# Patient Record
Sex: MALE | Race: WHITE | Employment: OTHER
[De-identification: names, ages, dates, MRNs, and addresses within clinical notes are randomized per-mention and may not be internally consistent; named-entity substitution may affect disease eponyms.]

---

## 2023-12-27 ENCOUNTER — APPOINTMENT (OUTPATIENT)
Facility: HOSPITAL | Age: 83
End: 2023-12-27

## 2023-12-27 ENCOUNTER — HOSPITAL ENCOUNTER (EMERGENCY)
Facility: HOSPITAL | Age: 83
Discharge: HOME | End: 2023-12-27
Attending: EMERGENCY MEDICINE

## 2023-12-27 VITALS
OXYGEN SATURATION: 99 % | SYSTOLIC BLOOD PRESSURE: 159 MMHG | HEART RATE: 77 BPM | DIASTOLIC BLOOD PRESSURE: 71 MMHG | RESPIRATION RATE: 20 BRPM | WEIGHT: 185 LBS | BODY MASS INDEX: 25.06 KG/M2 | HEIGHT: 72 IN | TEMPERATURE: 97.5 F

## 2023-12-27 DIAGNOSIS — R79.89 ELEVATED TROPONIN: Primary | ICD-10-CM

## 2023-12-27 DIAGNOSIS — R06.02 SHORTNESS OF BREATH: ICD-10-CM

## 2023-12-27 LAB
ALBUMIN SERPL-MCNC: 2.9 G/DL (ref 3.5–5)
ALBUMIN/GLOB SERPL: 0.9 (ref 1.1–2.2)
ALP SERPL-CCNC: 74 U/L (ref 45–117)
ALT SERPL-CCNC: 29 U/L (ref 12–78)
ANION GAP SERPL CALC-SCNC: 2 MMOL/L (ref 5–15)
AST SERPL W P-5'-P-CCNC: 25 U/L (ref 15–37)
BASOPHILS # BLD: 0.1 K/UL (ref 0–0.1)
BASOPHILS NFR BLD: 1 % (ref 0–1)
BILIRUB SERPL-MCNC: 0.5 MG/DL (ref 0.2–1)
BUN SERPL-MCNC: 22 MG/DL (ref 6–20)
BUN/CREAT SERPL: 20 (ref 12–20)
CA-I BLD-MCNC: 9.4 MG/DL (ref 8.5–10.1)
CHLORIDE SERPL-SCNC: 108 MMOL/L (ref 97–108)
CO2 SERPL-SCNC: 30 MMOL/L (ref 21–32)
CREAT SERPL-MCNC: 1.12 MG/DL (ref 0.7–1.3)
DIFFERENTIAL METHOD BLD: ABNORMAL
EOSINOPHIL # BLD: 0.2 K/UL (ref 0–0.4)
EOSINOPHIL NFR BLD: 3 % (ref 0–7)
ERYTHROCYTE [DISTWIDTH] IN BLOOD BY AUTOMATED COUNT: 14.1 % (ref 11.5–14.5)
GLOBULIN SER CALC-MCNC: 3.4 G/DL (ref 2–4)
GLUCOSE SERPL-MCNC: 92 MG/DL (ref 65–100)
HCT VFR BLD AUTO: 38 % (ref 36.6–50.3)
HGB BLD-MCNC: 12.6 G/DL (ref 12.1–17)
IMM GRANULOCYTES # BLD AUTO: 0 K/UL (ref 0–0.04)
IMM GRANULOCYTES NFR BLD AUTO: 0 % (ref 0–0.5)
LYMPHOCYTES # BLD: 1.7 K/UL (ref 0.8–3.5)
LYMPHOCYTES NFR BLD: 28 % (ref 12–49)
MCH RBC QN AUTO: 30.9 PG (ref 26–34)
MCHC RBC AUTO-ENTMCNC: 33.2 G/DL (ref 30–36.5)
MCV RBC AUTO: 93.1 FL (ref 80–99)
MONOCYTES # BLD: 0.7 K/UL (ref 0–1)
MONOCYTES NFR BLD: 12 % (ref 5–13)
NEUTS SEG # BLD: 3.4 K/UL (ref 1.8–8)
NEUTS SEG NFR BLD: 56 % (ref 32–75)
NRBC # BLD: 0 K/UL (ref 0–0.01)
NRBC BLD-RTO: 0 PER 100 WBC
PLATELET # BLD AUTO: 239 K/UL (ref 150–400)
PMV BLD AUTO: 8.5 FL (ref 8.9–12.9)
POTASSIUM SERPL-SCNC: 4.2 MMOL/L (ref 3.5–5.1)
PROT SERPL-MCNC: 6.3 G/DL (ref 6.4–8.2)
RBC # BLD AUTO: 4.08 M/UL (ref 4.1–5.7)
SODIUM SERPL-SCNC: 140 MMOL/L (ref 136–145)
TROPONIN I SERPL HS-MCNC: 179 NG/L (ref 0–76)
TROPONIN I SERPL HS-MCNC: 184 NG/L (ref 0–76)
TROPONIN I SERPL HS-MCNC: 202 NG/L (ref 0–76)
WBC # BLD AUTO: 6 K/UL (ref 4.1–11.1)

## 2023-12-27 PROCEDURE — 84484 ASSAY OF TROPONIN QUANT: CPT

## 2023-12-27 PROCEDURE — 36415 COLL VENOUS BLD VENIPUNCTURE: CPT

## 2023-12-27 PROCEDURE — 99285 EMERGENCY DEPT VISIT HI MDM: CPT

## 2023-12-27 PROCEDURE — 71045 X-RAY EXAM CHEST 1 VIEW: CPT

## 2023-12-27 PROCEDURE — 93005 ELECTROCARDIOGRAM TRACING: CPT

## 2023-12-27 PROCEDURE — 80053 COMPREHEN METABOLIC PANEL: CPT

## 2023-12-27 PROCEDURE — 93005 ELECTROCARDIOGRAM TRACING: CPT | Performed by: EMERGENCY MEDICINE

## 2023-12-27 PROCEDURE — 85025 COMPLETE CBC W/AUTO DIFF WBC: CPT

## 2023-12-27 NOTE — ED TRIAGE NOTES
[facility]

## 2023-12-27 NOTE — ED PROVIDER NOTES
Cessation: Not Applicable    PROCEDURES   Unless otherwise noted above, none. Procedures      CRITICAL CARE TIME   Patient does not meet Critical Care Time, 0 minutes    FINAL IMPRESSION   No diagnosis found. DISPOSITION/PLAN   DISPOSITION    I was not present at the time of ED disposition but patient ultimately discharged home. PATIENT REFERRED TO:  No follow-up provider specified. DISCHARGE MEDICATIONS:     Medication List      You have not been prescribed any medications. DISCONTINUED MEDICATIONS:  There are no discharge medications for this patient. I am the Primary Clinician of Record: CONTRERAS Serna NP (electronically signed)    (Please note that parts of this dictation were completed with voice recognition software. Quite often unanticipated grammatical, syntax, homophones, and other interpretive errors are inadvertently transcribed by the computer software. Please disregards these errors.  Please excuse any errors that have escaped final proofreading.)     CONTRERAS Serna NP  12/28/23 9026       CONTRERAS Serna NP  12/28/23 2113

## 2023-12-28 LAB
EKG ATRIAL RATE: 58 BPM
EKG ATRIAL RATE: 61 BPM
EKG ATRIAL RATE: 62 BPM
EKG DIAGNOSIS: NORMAL
EKG P AXIS: 27 DEGREES
EKG P AXIS: 37 DEGREES
EKG P AXIS: 39 DEGREES
EKG P-R INTERVAL: 160 MS
EKG P-R INTERVAL: 174 MS
EKG P-R INTERVAL: 186 MS
EKG Q-T INTERVAL: 442 MS
EKG Q-T INTERVAL: 454 MS
EKG Q-T INTERVAL: 476 MS
EKG QRS DURATION: 78 MS
EKG QRS DURATION: 90 MS
EKG QRS DURATION: 90 MS
EKG QTC CALCULATION (BAZETT): 444 MS
EKG QTC CALCULATION (BAZETT): 460 MS
EKG QTC CALCULATION (BAZETT): 467 MS
EKG R AXIS: -10 DEGREES
EKG R AXIS: 14 DEGREES
EKG R AXIS: 18 DEGREES
EKG T AXIS: 23 DEGREES
EKG T AXIS: 35 DEGREES
EKG T AXIS: 49 DEGREES
EKG VENTRICULAR RATE: 58 BPM
EKG VENTRICULAR RATE: 61 BPM
EKG VENTRICULAR RATE: 62 BPM

## 2023-12-28 NOTE — DISCHARGE INSTRUCTIONS
Monocytes Absolute 0.7 0.0 - 1.0 K/UL    Eosinophils Absolute 0.2 0.0 - 0.4 K/UL    Basophils Absolute 0.1 0.0 - 0.1 K/UL    Absolute Immature Granulocyte 0.0 0.00 - 0.04 K/UL    Differential Type AUTOMATED     Troponin    Collection Time: 12/27/23  3:54 PM   Result Value Ref Range    Troponin, High Sensitivity 179 (HH) 0 - 76 ng/L   Troponin    Collection Time: 12/27/23  7:06 PM   Result Value Ref Range    Troponin, High Sensitivity 202 (HH) 0 - 76 ng/L   Troponin    Collection Time: 12/27/23  8:39 PM   Result Value Ref Range    Troponin, High Sensitivity 184 (HH) 0 - 76 ng/L       Radiologic Studies  XR CHEST PORTABLE   Final Result   No acute cardiopulmonary findings. ------------------------------------------------------------------------------------------------------------  The exam and treatment you received in the Emergency Department were for an urgent problem and are not intended as complete care. It is important that you follow-up with a doctor, nurse practitioner, or physician assistant to:  (1) confirm your diagnosis,  (2) re-evaluation of changes in your illness and treatment, and  (3) for ongoing care. Please take your discharge instructions with you when you go to your follow-up appointment. If you have any problem arranging a follow-up appointment, contact the Emergency Department. If your symptoms become worse or you do not improve as expected and you are unable to reach your health care provider, please return to the Emergency Department. We are available 24 hours a day. If a prescription has been provided, please have it filled as soon as possible to prevent a delay in treatment. If you have any questions or reservations about taking the medication due to side effects or interactions with other medications, please call your primary care provider or contact the ER.

## 2025-04-02 ENCOUNTER — APPOINTMENT (OUTPATIENT)
Facility: HOSPITAL | Age: 85
End: 2025-04-02
Payer: OTHER GOVERNMENT

## 2025-04-02 ENCOUNTER — HOSPITAL ENCOUNTER (INPATIENT)
Facility: HOSPITAL | Age: 85
LOS: 9 days | Discharge: HOSPICE | End: 2025-04-11
Attending: EMERGENCY MEDICINE | Admitting: INTERNAL MEDICINE
Payer: OTHER GOVERNMENT

## 2025-04-02 DIAGNOSIS — E87.0 HYPERNATREMIA: ICD-10-CM

## 2025-04-02 DIAGNOSIS — I21.4 NSTEMI (NON-ST ELEVATED MYOCARDIAL INFARCTION) (HCC): Primary | ICD-10-CM

## 2025-04-02 DIAGNOSIS — I63.49 CEREBROVASCULAR ACCIDENT (CVA) DUE TO EMBOLISM OF OTHER CEREBRAL ARTERY (HCC): ICD-10-CM

## 2025-04-02 DIAGNOSIS — J96.01 ACUTE RESPIRATORY FAILURE WITH HYPOXIA (HCC): ICD-10-CM

## 2025-04-02 DIAGNOSIS — N17.9 ACUTE KIDNEY INJURY: ICD-10-CM

## 2025-04-02 DIAGNOSIS — I42.9 CARDIOMYOPATHY, UNSPECIFIED TYPE (HCC): ICD-10-CM

## 2025-04-02 DIAGNOSIS — J96.01 ACUTE RESPIRATORY FAILURE WITH HYPOXEMIA (HCC): ICD-10-CM

## 2025-04-02 LAB
ALBUMIN SERPL-MCNC: 2.6 G/DL (ref 3.5–5)
ALBUMIN SERPL-MCNC: 2.6 G/DL (ref 3.5–5)
ALBUMIN/GLOB SERPL: 0.6 (ref 1.1–2.2)
ALBUMIN/GLOB SERPL: 0.6 (ref 1.1–2.2)
ALP SERPL-CCNC: 100 U/L (ref 45–117)
ALP SERPL-CCNC: 100 U/L (ref 45–117)
ALT SERPL-CCNC: 69 U/L (ref 12–78)
ALT SERPL-CCNC: 69 U/L (ref 12–78)
ANION GAP SERPL CALC-SCNC: 12 MMOL/L (ref 2–12)
ANION GAP SERPL CALC-SCNC: 12 MMOL/L (ref 2–12)
AST SERPL W P-5'-P-CCNC: 111 U/L (ref 15–37)
AST SERPL W P-5'-P-CCNC: 111 U/L (ref 15–37)
BASE DEFICIT BLD-SCNC: 13.7 MMOL/L
BASE DEFICIT BLD-SCNC: 13.7 MMOL/L
BASE DEFICIT BLD-SCNC: 7 MMOL/L
BASE DEFICIT BLD-SCNC: 7 MMOL/L
BASOPHILS # BLD: 0.05 K/UL (ref 0–0.1)
BASOPHILS # BLD: 0.05 K/UL (ref 0–0.1)
BASOPHILS NFR BLD: 0.3 % (ref 0–1)
BASOPHILS NFR BLD: 0.3 % (ref 0–1)
BILIRUB SERPL-MCNC: 1.2 MG/DL (ref 0.2–1)
BILIRUB SERPL-MCNC: 1.2 MG/DL (ref 0.2–1)
BNP SERPL-MCNC: 9623 PG/ML
BNP SERPL-MCNC: 9623 PG/ML
BUN SERPL-MCNC: 152 MG/DL (ref 6–20)
BUN SERPL-MCNC: 152 MG/DL (ref 6–20)
BUN/CREAT SERPL: 34 (ref 12–20)
BUN/CREAT SERPL: 34 (ref 12–20)
CA-I BLD-MCNC: 0.66 MMOL/L (ref 1.12–1.32)
CA-I BLD-MCNC: 0.66 MMOL/L (ref 1.12–1.32)
CA-I BLD-MCNC: 9.4 MG/DL (ref 8.5–10.1)
CA-I BLD-MCNC: 9.4 MG/DL (ref 8.5–10.1)
CHLORIDE BLD-SCNC: 132 MMOL/L (ref 98–107)
CHLORIDE BLD-SCNC: 132 MMOL/L (ref 98–107)
CHLORIDE SERPL-SCNC: 123 MMOL/L (ref 97–108)
CHLORIDE SERPL-SCNC: 123 MMOL/L (ref 97–108)
CK SERPL-CCNC: 1243 U/L (ref 39–308)
CK SERPL-CCNC: 1243 U/L (ref 39–308)
CO2 BLD-SCNC: 10 MMOL/L
CO2 BLD-SCNC: 10 MMOL/L
CO2 SERPL-SCNC: 21 MMOL/L (ref 21–32)
CO2 SERPL-SCNC: 21 MMOL/L (ref 21–32)
CREAT SERPL-MCNC: 4.49 MG/DL (ref 0.7–1.3)
CREAT SERPL-MCNC: 4.49 MG/DL (ref 0.7–1.3)
CREAT UR-MCNC: 1.84 MG/DL (ref 0.6–1.3)
CREAT UR-MCNC: 1.84 MG/DL (ref 0.6–1.3)
DIFFERENTIAL METHOD BLD: ABNORMAL
DIFFERENTIAL METHOD BLD: ABNORMAL
EOSINOPHIL # BLD: 0 K/UL (ref 0–0.4)
EOSINOPHIL # BLD: 0 K/UL (ref 0–0.4)
EOSINOPHIL NFR BLD: 0 % (ref 0–7)
EOSINOPHIL NFR BLD: 0 % (ref 0–7)
ERYTHROCYTE [DISTWIDTH] IN BLOOD BY AUTOMATED COUNT: 14.9 % (ref 11.5–14.5)
ERYTHROCYTE [DISTWIDTH] IN BLOOD BY AUTOMATED COUNT: 14.9 % (ref 11.5–14.5)
GLOBULIN SER CALC-MCNC: 4.5 G/DL (ref 2–4)
GLOBULIN SER CALC-MCNC: 4.5 G/DL (ref 2–4)
GLUCOSE BLD STRIP.AUTO-MCNC: 69 MG/DL (ref 65–100)
GLUCOSE BLD STRIP.AUTO-MCNC: 69 MG/DL (ref 65–100)
GLUCOSE SERPL-MCNC: 113 MG/DL (ref 65–100)
GLUCOSE SERPL-MCNC: 113 MG/DL (ref 65–100)
HCO3 BLD-SCNC: 10.3 MMOL/L (ref 19–28)
HCO3 BLD-SCNC: 10.3 MMOL/L (ref 19–28)
HCO3 BLD-SCNC: 18.1 MMOL/L (ref 19–28)
HCO3 BLD-SCNC: 18.1 MMOL/L (ref 19–28)
HCT VFR BLD AUTO: 54.6 % (ref 36.6–50.3)
HCT VFR BLD AUTO: 54.6 % (ref 36.6–50.3)
HGB BLD-MCNC: 17.6 G/DL (ref 12.1–17)
HGB BLD-MCNC: 17.6 G/DL (ref 12.1–17)
IMM GRANULOCYTES # BLD AUTO: 0.19 K/UL (ref 0–0.04)
IMM GRANULOCYTES # BLD AUTO: 0.19 K/UL (ref 0–0.04)
IMM GRANULOCYTES NFR BLD AUTO: 1 % (ref 0–0.5)
IMM GRANULOCYTES NFR BLD AUTO: 1 % (ref 0–0.5)
LACTATE BLD-SCNC: 2.28 MMOL/L (ref 0.4–2)
LACTATE BLD-SCNC: 2.28 MMOL/L (ref 0.4–2)
LACTATE BLD-SCNC: 2.61 MMOL/L (ref 0.4–2)
LACTATE BLD-SCNC: 2.61 MMOL/L (ref 0.4–2)
LACTATE BLD-SCNC: 3.42 MMOL/L (ref 0.4–2)
LACTATE BLD-SCNC: 3.42 MMOL/L (ref 0.4–2)
LYMPHOCYTES # BLD: 3.33 K/UL (ref 0.8–3.5)
LYMPHOCYTES # BLD: 3.33 K/UL (ref 0.8–3.5)
LYMPHOCYTES NFR BLD: 17.1 % (ref 12–49)
LYMPHOCYTES NFR BLD: 17.1 % (ref 12–49)
MAGNESIUM SERPL-MCNC: 4 MG/DL (ref 1.6–2.4)
MAGNESIUM SERPL-MCNC: 4 MG/DL (ref 1.6–2.4)
MCH RBC QN AUTO: 31 PG (ref 26–34)
MCH RBC QN AUTO: 31 PG (ref 26–34)
MCHC RBC AUTO-ENTMCNC: 32.2 G/DL (ref 30–36.5)
MCHC RBC AUTO-ENTMCNC: 32.2 G/DL (ref 30–36.5)
MCV RBC AUTO: 96.3 FL (ref 80–99)
MCV RBC AUTO: 96.3 FL (ref 80–99)
MONOCYTES # BLD: 1.23 K/UL (ref 0–1)
MONOCYTES # BLD: 1.23 K/UL (ref 0–1)
MONOCYTES NFR BLD: 6.3 % (ref 5–13)
MONOCYTES NFR BLD: 6.3 % (ref 5–13)
NEUTS SEG # BLD: 14.66 K/UL (ref 1.8–8)
NEUTS SEG # BLD: 14.66 K/UL (ref 1.8–8)
NEUTS SEG NFR BLD: 75.3 % (ref 32–75)
NEUTS SEG NFR BLD: 75.3 % (ref 32–75)
NRBC # BLD: 0.02 K/UL (ref 0–0.01)
NRBC # BLD: 0.02 K/UL (ref 0–0.01)
NRBC BLD-RTO: 0.1 PER 100 WBC
NRBC BLD-RTO: 0.1 PER 100 WBC
PCO2 BLD: 19.2 MMHG (ref 35–45)
PCO2 BLD: 19.2 MMHG (ref 35–45)
PCO2 BLD: 34.8 MMHG (ref 35–45)
PCO2 BLD: 34.8 MMHG (ref 35–45)
PERFORMED BY:: ABNORMAL
PH BLD: 7.33 (ref 7.35–7.45)
PH BLD: 7.33 (ref 7.35–7.45)
PH BLD: 7.34 (ref 7.35–7.45)
PH BLD: 7.34 (ref 7.35–7.45)
PHOSPHATE SERPL-MCNC: 4.5 MG/DL (ref 2.6–4.7)
PHOSPHATE SERPL-MCNC: 4.5 MG/DL (ref 2.6–4.7)
PLATELET # BLD AUTO: 159 K/UL (ref 150–400)
PLATELET # BLD AUTO: 159 K/UL (ref 150–400)
PMV BLD AUTO: 11 FL (ref 8.9–12.9)
PMV BLD AUTO: 11 FL (ref 8.9–12.9)
PO2 BLD: 101 MMHG (ref 75–100)
PO2 BLD: 101 MMHG (ref 75–100)
PO2 BLD: 28 MMHG (ref 75–100)
PO2 BLD: 28 MMHG (ref 75–100)
POTASSIUM BLD-SCNC: 3.9 MMOL/L (ref 3.5–5.5)
POTASSIUM BLD-SCNC: 3.9 MMOL/L (ref 3.5–5.5)
POTASSIUM SERPL-SCNC: 5.1 MMOL/L (ref 3.5–5.1)
POTASSIUM SERPL-SCNC: 5.1 MMOL/L (ref 3.5–5.1)
PROCALCITONIN SERPL-MCNC: 3.95 NG/ML
PROCALCITONIN SERPL-MCNC: 3.95 NG/ML
PROT SERPL-MCNC: 7.1 G/DL (ref 6.4–8.2)
PROT SERPL-MCNC: 7.1 G/DL (ref 6.4–8.2)
RBC # BLD AUTO: 5.67 M/UL (ref 4.1–5.7)
RBC # BLD AUTO: 5.67 M/UL (ref 4.1–5.7)
SAO2 % BLD: 51 %
SAO2 % BLD: 51 %
SAO2 % BLD: 97.4 %
SAO2 % BLD: 97.4 %
SERVICE CMNT-IMP: ABNORMAL
SERVICE CMNT-IMP: ABNORMAL
SODIUM BLD-SCNC: 159 MMOL/L (ref 136–145)
SODIUM BLD-SCNC: 159 MMOL/L (ref 136–145)
SODIUM SERPL-SCNC: 156 MMOL/L (ref 136–145)
SODIUM SERPL-SCNC: 156 MMOL/L (ref 136–145)
SPECIMEN SITE: ABNORMAL
SPECIMEN SITE: ABNORMAL
SPECIMEN TYPE: ABNORMAL
SPECIMEN TYPE: ABNORMAL
TROPONIN I SERPL HS-MCNC: 240 NG/L (ref 0–76)
TROPONIN I SERPL HS-MCNC: 240 NG/L (ref 0–76)
WBC # BLD AUTO: 19.5 K/UL (ref 4.1–11.1)
WBC # BLD AUTO: 19.5 K/UL (ref 4.1–11.1)

## 2025-04-02 PROCEDURE — 83605 ASSAY OF LACTIC ACID: CPT

## 2025-04-02 PROCEDURE — 2580000003 HC RX 258: Performed by: EMERGENCY MEDICINE

## 2025-04-02 PROCEDURE — 2700000000 HC OXYGEN THERAPY PER DAY

## 2025-04-02 PROCEDURE — 82947 ASSAY GLUCOSE BLOOD QUANT: CPT

## 2025-04-02 PROCEDURE — 71045 X-RAY EXAM CHEST 1 VIEW: CPT

## 2025-04-02 PROCEDURE — 31500 INSERT EMERGENCY AIRWAY: CPT

## 2025-04-02 PROCEDURE — 2000000000 HC ICU R&B

## 2025-04-02 PROCEDURE — 93005 ELECTROCARDIOGRAM TRACING: CPT | Performed by: EMERGENCY MEDICINE

## 2025-04-02 PROCEDURE — 84295 ASSAY OF SERUM SODIUM: CPT

## 2025-04-02 PROCEDURE — 72125 CT NECK SPINE W/O DYE: CPT

## 2025-04-02 PROCEDURE — 2500000003 HC RX 250 WO HCPCS: Performed by: EMERGENCY MEDICINE

## 2025-04-02 PROCEDURE — 99285 EMERGENCY DEPT VISIT HI MDM: CPT

## 2025-04-02 PROCEDURE — 82550 ASSAY OF CK (CPK): CPT

## 2025-04-02 PROCEDURE — 84484 ASSAY OF TROPONIN QUANT: CPT

## 2025-04-02 PROCEDURE — 94002 VENT MGMT INPAT INIT DAY: CPT

## 2025-04-02 PROCEDURE — 96375 TX/PRO/DX INJ NEW DRUG ADDON: CPT

## 2025-04-02 PROCEDURE — 84145 PROCALCITONIN (PCT): CPT

## 2025-04-02 PROCEDURE — 87077 CULTURE AEROBIC IDENTIFY: CPT

## 2025-04-02 PROCEDURE — 87154 CUL TYP ID BLD PTHGN 6+ TRGT: CPT

## 2025-04-02 PROCEDURE — 6360000002 HC RX W HCPCS: Performed by: EMERGENCY MEDICINE

## 2025-04-02 PROCEDURE — 87186 SC STD MICRODIL/AGAR DIL: CPT

## 2025-04-02 PROCEDURE — 82330 ASSAY OF CALCIUM: CPT

## 2025-04-02 PROCEDURE — 83880 ASSAY OF NATRIURETIC PEPTIDE: CPT

## 2025-04-02 PROCEDURE — 84132 ASSAY OF SERUM POTASSIUM: CPT

## 2025-04-02 PROCEDURE — 96367 TX/PROPH/DG ADDL SEQ IV INF: CPT

## 2025-04-02 PROCEDURE — 2500000003 HC RX 250 WO HCPCS

## 2025-04-02 PROCEDURE — 70450 CT HEAD/BRAIN W/O DYE: CPT

## 2025-04-02 PROCEDURE — 96365 THER/PROPH/DIAG IV INF INIT: CPT

## 2025-04-02 PROCEDURE — 83735 ASSAY OF MAGNESIUM: CPT

## 2025-04-02 PROCEDURE — 87040 BLOOD CULTURE FOR BACTERIA: CPT

## 2025-04-02 PROCEDURE — 5A1955Z RESPIRATORY VENTILATION, GREATER THAN 96 CONSECUTIVE HOURS: ICD-10-PCS

## 2025-04-02 PROCEDURE — 82803 BLOOD GASES ANY COMBINATION: CPT

## 2025-04-02 PROCEDURE — 94761 N-INVAS EAR/PLS OXIMETRY MLT: CPT

## 2025-04-02 PROCEDURE — 84100 ASSAY OF PHOSPHORUS: CPT

## 2025-04-02 PROCEDURE — 85025 COMPLETE CBC W/AUTO DIFF WBC: CPT

## 2025-04-02 PROCEDURE — 80053 COMPREHEN METABOLIC PANEL: CPT

## 2025-04-02 PROCEDURE — 0BH17EZ INSERTION OF ENDOTRACHEAL AIRWAY INTO TRACHEA, VIA NATURAL OR ARTIFICIAL OPENING: ICD-10-PCS | Performed by: EMERGENCY MEDICINE

## 2025-04-02 PROCEDURE — 36415 COLL VENOUS BLD VENIPUNCTURE: CPT

## 2025-04-02 RX ORDER — 0.9 % SODIUM CHLORIDE 0.9 %
1000 INTRAVENOUS SOLUTION INTRAVENOUS ONCE
Status: COMPLETED | OUTPATIENT
Start: 2025-04-02 | End: 2025-04-02

## 2025-04-02 RX ORDER — NOREPINEPHRINE BITARTRATE 0.06 MG/ML
1-100 INJECTION, SOLUTION INTRAVENOUS CONTINUOUS
Status: DISCONTINUED | OUTPATIENT
Start: 2025-04-02 | End: 2025-04-09

## 2025-04-02 RX ORDER — NOREPINEPHRINE BITARTRATE 0.06 MG/ML
1-100 INJECTION, SOLUTION INTRAVENOUS CONTINUOUS
Status: DISCONTINUED | OUTPATIENT
Start: 2025-04-02 | End: 2025-04-05 | Stop reason: SDUPTHER

## 2025-04-02 RX ORDER — 0.9 % SODIUM CHLORIDE 0.9 %
INTRAVENOUS SOLUTION INTRAVENOUS CONTINUOUS PRN
Status: COMPLETED | OUTPATIENT
Start: 2025-04-02 | End: 2025-04-02

## 2025-04-02 RX ORDER — ONDANSETRON 2 MG/ML
4 INJECTION INTRAMUSCULAR; INTRAVENOUS EVERY 8 HOURS PRN
Status: DISCONTINUED | OUTPATIENT
Start: 2025-04-02 | End: 2025-04-11 | Stop reason: HOSPADM

## 2025-04-02 RX ORDER — ROCURONIUM BROMIDE 10 MG/ML
INJECTION, SOLUTION INTRAVENOUS DAILY PRN
Status: COMPLETED | OUTPATIENT
Start: 2025-04-02 | End: 2025-04-02

## 2025-04-02 RX ORDER — SODIUM CHLORIDE, SODIUM LACTATE, POTASSIUM CHLORIDE, AND CALCIUM CHLORIDE .6; .31; .03; .02 G/100ML; G/100ML; G/100ML; G/100ML
2000 INJECTION, SOLUTION INTRAVENOUS ONCE
Status: COMPLETED | OUTPATIENT
Start: 2025-04-02 | End: 2025-04-03

## 2025-04-02 RX ORDER — SODIUM CHLORIDE, SODIUM LACTATE, POTASSIUM CHLORIDE, CALCIUM CHLORIDE 600; 310; 30; 20 MG/100ML; MG/100ML; MG/100ML; MG/100ML
INJECTION, SOLUTION INTRAVENOUS CONTINUOUS
Status: DISPENSED | OUTPATIENT
Start: 2025-04-02 | End: 2025-04-03

## 2025-04-02 RX ORDER — HYDROCORTISONE SODIUM SUCCINATE 100 MG/2ML
100 INJECTION INTRAMUSCULAR; INTRAVENOUS ONCE
Status: COMPLETED | OUTPATIENT
Start: 2025-04-02 | End: 2025-04-03

## 2025-04-02 RX ORDER — ETOMIDATE 2 MG/ML
INJECTION INTRAVENOUS DAILY PRN
Status: COMPLETED | OUTPATIENT
Start: 2025-04-02 | End: 2025-04-02

## 2025-04-02 RX ORDER — NOREPINEPHRINE BITARTRATE 0.06 MG/ML
INJECTION, SOLUTION INTRAVENOUS
Status: COMPLETED
Start: 2025-04-02 | End: 2025-04-02

## 2025-04-02 RX ORDER — HYDROCORTISONE SODIUM SUCCINATE 100 MG/2ML
50 INJECTION INTRAMUSCULAR; INTRAVENOUS EVERY 8 HOURS
Status: DISCONTINUED | OUTPATIENT
Start: 2025-04-03 | End: 2025-04-03

## 2025-04-02 RX ADMIN — Medication 10 MCG/MIN: at 17:06

## 2025-04-02 RX ADMIN — PIPERACILLIN AND TAZOBACTAM 4500 MG: 4; .5 INJECTION, POWDER, LYOPHILIZED, FOR SOLUTION INTRAVENOUS at 20:34

## 2025-04-02 RX ADMIN — SODIUM CHLORIDE 1000 ML: 0.9 INJECTION, SOLUTION INTRAVENOUS at 19:14

## 2025-04-02 RX ADMIN — ROCURONIUM BROMIDE 100 MG: 10 INJECTION, SOLUTION INTRAVENOUS at 17:14

## 2025-04-02 RX ADMIN — VANCOMYCIN HYDROCHLORIDE 1750 MG: 1 INJECTION, POWDER, LYOPHILIZED, FOR SOLUTION INTRAVENOUS at 21:19

## 2025-04-02 RX ADMIN — ETOMIDATE INJECTION 10 MG: 2 SOLUTION INTRAVENOUS at 17:13

## 2025-04-02 RX ADMIN — SODIUM CHLORIDE 1000 ML: 0.9 INJECTION, SOLUTION INTRAVENOUS at 17:45

## 2025-04-02 RX ADMIN — SODIUM CHLORIDE 1000 ML: 9 INJECTION, SOLUTION INTRAVENOUS at 17:03

## 2025-04-02 ASSESSMENT — PAIN - FUNCTIONAL ASSESSMENT: PAIN_FUNCTIONAL_ASSESSMENT: CRITICAL CARE PAIN OBSERVATION TOOL (CPOT)

## 2025-04-02 ASSESSMENT — PULMONARY FUNCTION TESTS
PIF_VALUE: 18
PIF_VALUE: 19
PIF_VALUE: 18

## 2025-04-02 ASSESSMENT — HEART SCORE: ECG: NON-SPECIFC REPOLARIZATION DISTURBANCE/LBTB/PM

## 2025-04-02 NOTE — ED NOTES
Dr. Ellison notified of pt's temperature. Verbal to place pt on barehugger.       1811: Pt placed on barehugger at this time.

## 2025-04-02 NOTE — ED NOTES
Wounds noted on right side of face, lips, bilateral knees, thoracic spine in the middle, sacrum, left wrist, right wrist, right shoulder, pelvis area, and chest.

## 2025-04-02 NOTE — ED TRIAGE NOTES
Pt arrives via ems from home after being found face down in his home. Estimated to have been on the floor for about 3 days. Right side of face has been eaten away by acid in urine that he was laying in.     GCS 3    Arrives on NRB mask

## 2025-04-02 NOTE — ED PROVIDER NOTES
[facility] EMERGENCY DEPT  EMERGENCY DEPARTMENT HISTORY AND PHYSICAL EXAM      Date of evaluation: 4/2/2025  Patient Name: Jerel Hamm  Birthdate 1/1/1880  MRN: [id number]  ED Provider: Josue Ellison DO   Note Started: 5:20 PM EDT 4/2/25    HISTORY OF PRESENT ILLNESS     Chief Complaint   Patient presents with    Unresponsive     History Provided By: EMS and Nursing Staff    HPI: Jerel Hamm is a 89-year-old male with unknown past medical history who presents after being found unresponsive at home. He was estimated to have been face down on the floor for approximately 2-3 days with notable tissue damage to the right side of his face from prolonged contact with urine. Upon EMS arrival, he was GCS 3 and placed on a non-rebreather mask. History is limited.    PAST MEDICAL HISTORY   Past Medical History:  No past medical history on file.    Past Surgical History:  No past surgical history on file.    Family History:  No family history on file.    Social History:       Allergies:  Not on File    PCP: No primary care provider on file.    Current Meds:   Current Facility-Administered Medications   Medication Dose Route Frequency Provider Last Rate Last Admin    sodium chloride 0.9 % bolus    Continuous PRN Josue Ellison DO   Stopped at 04/02/25 1743    norepinephrine (LEVOPHED) 16 mg in sodium chloride 0.9 % 250 mL infusion (premix)  1-100 mcg/min IntraVENous Continuous Josue Ellison DO 9.4 mL/hr at 04/02/25 1706 10 mcg/min at 04/02/25 1706    etomidate (AMIDATE) injection    Daily PRN Josue Ellison DO   10 mg at 04/02/25 1713    rocuronium (ZEMURON) injection    Daily PRN Josue Ellison DO   100 mg at 04/02/25 1714    sodium chloride 0.9 % bolus 1,000 mL  1,000 mL IntraVENous Once Josue Ellison .9 mL/hr at 04/02/25 1914 1,000 mL at 04/02/25 1914    vancomycin (VANCOCIN) 1,750 mg in sodium chloride 0.9 % 500 mL IVPB  25 mg/kg IntraVENous Once Josue Ellison DO        piperacillin-tazobactam (ZOSYN) 4,500  mg in sodium chloride 0.9 % 100 mL IVPB (addEASE)  4,500 mg IntraVENous Once Josue Ellison, DO         No current outpatient medications on file.       Social Determinants of Health:   Social Drivers of Health     Tobacco Use: Not on file   Alcohol Use: Not on file   Financial Resource Strain: Not on file   Food Insecurity: Not on file   Transportation Needs: Not on file   Physical Activity: Not on file   Stress: Not on file   Social Connections: Not on file   Intimate Partner Violence: Not on file   Depression: Not on file   Housing Stability: Not on file   Interpersonal Safety: Not on file   Utilities: Not on file       PHYSICAL EXAM   Constitutional: Ill-appearing. Obtunded.  Cardiac: Regular rate and rhythm. Normal perfusion.  Respiratory: Diminished lung sounds. No apparent respiratory distress.  Skin: Right-sided facial erosion. No generalized rash.  ENT: No rhinorrhea. Head is normocephalic and atraumatic.  Eye: No proptosis or conjunctival injections.  Gastrointestinal: Nondistended. Flat.   Musculoskeletal: No obvious bony deformities.    SCREENINGS   History: 1  EC  Patient Age: 2  Risk Factors: 1  Troponin: 2  Heart Score Total: 7              No data recorded       LAB, EKG AND DIAGNOSTIC RESULTS   Labs:  Recent Results (from the past 12 hours)   POC Blood Gas and Chemistry    Collection Time: 25  5:03 PM   Result Value Ref Range    POC pH 7.34 (L) 7.35 - 7.45      POC pCO2 19.2 (L) 35.0 - 45.0 mmHg    POC PO2 28 (LL) 75 - 100 mmHg    POC Sodium 159 (H) 136 - 145 mmol/L    POC Potassium 3.9 3.5 - 5.5 mmol/L    POC Ionized Calcium 0.66 (LL) 1.12 - 1.32 mmol/L    POC Glucose 69 65 - 100 mg/dL    POC Chloride 132 (H) 98 - 107 MMOL/L    POC Creatinine 1.84 (H) 0.6 - 1.3 MG/DL    eGFR, POC 24 (L) >60 ml/min/1.73m2    Base Deficit (POC) 13.7 mmol/L    POC HCO3 10.3 (L) 19.0 - 28.0 mmol/L    POC TCO2 10 MMOL/L    Source Venous      Performed by: GERMAINE CRUZ     POC Lactic Acid 2.61 (HH) 0.40 -

## 2025-04-03 ENCOUNTER — APPOINTMENT (OUTPATIENT)
Facility: HOSPITAL | Age: 85
End: 2025-04-03
Attending: STUDENT IN AN ORGANIZED HEALTH CARE EDUCATION/TRAINING PROGRAM
Payer: OTHER GOVERNMENT

## 2025-04-03 ENCOUNTER — APPOINTMENT (OUTPATIENT)
Facility: HOSPITAL | Age: 85
End: 2025-04-03
Payer: OTHER GOVERNMENT

## 2025-04-03 LAB
ACCESSION NUMBER, LLC1M: ABNORMAL
ACCESSION NUMBER, LLC1M: ABNORMAL
ACINETOBACTER CALCOAC BAUMANNII COMPLEX BY PCR: NOT DETECTED
ACINETOBACTER CALCOAC BAUMANNII COMPLEX BY PCR: NOT DETECTED
ALBUMIN SERPL-MCNC: 2 G/DL (ref 3.5–5)
ALBUMIN SERPL-MCNC: 2 G/DL (ref 3.5–5)
ALBUMIN/GLOB SERPL: 0.6 (ref 1.1–2.2)
ALBUMIN/GLOB SERPL: 0.6 (ref 1.1–2.2)
ALP SERPL-CCNC: 81 U/L (ref 45–117)
ALP SERPL-CCNC: 81 U/L (ref 45–117)
ALT SERPL-CCNC: 53 U/L (ref 12–78)
ALT SERPL-CCNC: 53 U/L (ref 12–78)
AMMONIA PLAS-SCNC: 46 UMOL/L
AMMONIA PLAS-SCNC: 46 UMOL/L
AMPHET UR QL SCN: NEGATIVE
AMPHET UR QL SCN: NEGATIVE
ANION GAP SERPL CALC-SCNC: 7 MMOL/L (ref 2–12)
ANION GAP SERPL CALC-SCNC: 7 MMOL/L (ref 2–12)
ANION GAP SERPL CALC-SCNC: 8 MMOL/L (ref 2–12)
ANION GAP SERPL CALC-SCNC: 8 MMOL/L (ref 2–12)
ANION GAP SERPL CALC-SCNC: 9 MMOL/L (ref 2–12)
ANION GAP SERPL CALC-SCNC: 9 MMOL/L (ref 2–12)
APPEARANCE UR: ABNORMAL
APPEARANCE UR: ABNORMAL
ARTERIAL PATENCY WRIST A: ABNORMAL
ARTERIAL PATENCY WRIST A: ABNORMAL
ARTERIAL PATENCY WRIST A: YES
ARTERIAL PATENCY WRIST A: YES
AST SERPL W P-5'-P-CCNC: 74 U/L (ref 15–37)
AST SERPL W P-5'-P-CCNC: 74 U/L (ref 15–37)
BACTERIA SPEC CULT: NORMAL
BACTERIA URNS QL MICRO: ABNORMAL /HPF
BACTERIA URNS QL MICRO: ABNORMAL /HPF
BACTEROIDES FRAGILIS BY PCR: NOT DETECTED
BACTEROIDES FRAGILIS BY PCR: NOT DETECTED
BARBITURATES UR QL SCN: NEGATIVE
BARBITURATES UR QL SCN: NEGATIVE
BASE DEFICIT BLDA-SCNC: 6.1 MMOL/L
BASE DEFICIT BLDA-SCNC: 6.1 MMOL/L
BASE DEFICIT BLDA-SCNC: 6.3 MMOL/L
BASE DEFICIT BLDA-SCNC: 6.3 MMOL/L
BASOPHILS # BLD: 0.07 K/UL (ref 0–0.1)
BASOPHILS # BLD: 0.07 K/UL (ref 0–0.1)
BASOPHILS NFR BLD: 0.4 % (ref 0–1)
BASOPHILS NFR BLD: 0.4 % (ref 0–1)
BDY SITE: ABNORMAL
BENZODIAZ UR QL: NEGATIVE
BENZODIAZ UR QL: NEGATIVE
BILIRUB SERPL-MCNC: 1.2 MG/DL (ref 0.2–1)
BILIRUB SERPL-MCNC: 1.2 MG/DL (ref 0.2–1)
BILIRUB UR QL: NEGATIVE
BILIRUB UR QL: NEGATIVE
BIOFIRE TEST COMMENT: ABNORMAL
BIOFIRE TEST COMMENT: ABNORMAL
BODY TEMPERATURE: 98.6
BODY TEMPERATURE: 98.6
BODY TEMPERATURE: 98.7
BODY TEMPERATURE: 98.7
BUN SERPL-MCNC: 142 MG/DL (ref 6–20)
BUN SERPL-MCNC: 142 MG/DL (ref 6–20)
BUN SERPL-MCNC: 147 MG/DL (ref 6–20)
BUN SERPL-MCNC: 147 MG/DL (ref 6–20)
BUN SERPL-MCNC: 157 MG/DL (ref 6–20)
BUN SERPL-MCNC: 157 MG/DL (ref 6–20)
BUN/CREAT SERPL: 36 (ref 12–20)
BUN/CREAT SERPL: 36 (ref 12–20)
BUN/CREAT SERPL: 38 (ref 12–20)
BUN/CREAT SERPL: 38 (ref 12–20)
BUN/CREAT SERPL: 42 (ref 12–20)
BUN/CREAT SERPL: 42 (ref 12–20)
CA-I BLD-MCNC: 1.12 MMOL/L (ref 1.13–1.32)
CA-I BLD-MCNC: 1.12 MMOL/L (ref 1.13–1.32)
CA-I BLD-MCNC: 7.8 MG/DL (ref 8.5–10.1)
CA-I BLD-MCNC: 7.8 MG/DL (ref 8.5–10.1)
CA-I BLD-MCNC: 8.2 MG/DL (ref 8.5–10.1)
CANDIDA ALBICANS BY PCR: NOT DETECTED
CANDIDA ALBICANS BY PCR: NOT DETECTED
CANDIDA AURIS BY PCR: NOT DETECTED
CANDIDA AURIS BY PCR: NOT DETECTED
CANDIDA GLABRATA: NOT DETECTED
CANDIDA GLABRATA: NOT DETECTED
CANDIDA KRUSEI BY PCR: NOT DETECTED
CANDIDA KRUSEI BY PCR: NOT DETECTED
CANDIDA PARAPSILOSIS BY PCR: NOT DETECTED
CANDIDA PARAPSILOSIS BY PCR: NOT DETECTED
CANDIDA TROPICALIS BY PCR: NOT DETECTED
CANDIDA TROPICALIS BY PCR: NOT DETECTED
CANNABINOIDS UR QL SCN: NEGATIVE
CANNABINOIDS UR QL SCN: NEGATIVE
CHLORIDE SERPL-SCNC: 125 MMOL/L (ref 97–108)
CHLORIDE SERPL-SCNC: 125 MMOL/L (ref 97–108)
CHLORIDE SERPL-SCNC: 127 MMOL/L (ref 97–108)
CHLORIDE UR-SCNC: 51 MMOL/L
CHLORIDE UR-SCNC: 51 MMOL/L
CK SERPL-CCNC: 815 U/L (ref 39–308)
CK SERPL-CCNC: 815 U/L (ref 39–308)
CO2 SERPL-SCNC: 21 MMOL/L (ref 21–32)
CO2 SERPL-SCNC: 22 MMOL/L (ref 21–32)
CO2 SERPL-SCNC: 22 MMOL/L (ref 21–32)
COCAINE UR QL SCN: NEGATIVE
COCAINE UR QL SCN: NEGATIVE
COHGB MFR BLD: 0 % (ref 1–2)
COHGB MFR BLD: 0 % (ref 1–2)
COHGB MFR BLD: 0.2 % (ref 1–2)
COHGB MFR BLD: 0.2 % (ref 1–2)
COLOR UR: ABNORMAL
COLOR UR: ABNORMAL
CORTIS SERPL-MCNC: >150 UG/DL
CORTIS SERPL-MCNC: >150 UG/DL
CREAT SERPL-MCNC: 3.7 MG/DL (ref 0.7–1.3)
CREAT SERPL-MCNC: 3.7 MG/DL (ref 0.7–1.3)
CREAT SERPL-MCNC: 3.83 MG/DL (ref 0.7–1.3)
CREAT SERPL-MCNC: 3.83 MG/DL (ref 0.7–1.3)
CREAT SERPL-MCNC: 3.98 MG/DL (ref 0.7–1.3)
CREAT SERPL-MCNC: 3.98 MG/DL (ref 0.7–1.3)
CREAT UR-MCNC: 69 MG/DL
CREAT UR-MCNC: 69 MG/DL
CRYPTOCOCCUS NEOFORMANS/GATTII BY PCR: NOT DETECTED
CRYPTOCOCCUS NEOFORMANS/GATTII BY PCR: NOT DETECTED
DIFFERENTIAL METHOD BLD: ABNORMAL
DIFFERENTIAL METHOD BLD: ABNORMAL
EKG ATRIAL RATE: 95 BPM
EKG ATRIAL RATE: 95 BPM
EKG DIAGNOSIS: NORMAL
EKG DIAGNOSIS: NORMAL
EKG P AXIS: 81 DEGREES
EKG P AXIS: 81 DEGREES
EKG P-R INTERVAL: 138 MS
EKG P-R INTERVAL: 138 MS
EKG Q-T INTERVAL: 372 MS
EKG Q-T INTERVAL: 372 MS
EKG QRS DURATION: 78 MS
EKG QRS DURATION: 78 MS
EKG QTC CALCULATION (BAZETT): 467 MS
EKG QTC CALCULATION (BAZETT): 467 MS
EKG R AXIS: 66 DEGREES
EKG R AXIS: 66 DEGREES
EKG T AXIS: 61 DEGREES
EKG T AXIS: 61 DEGREES
EKG VENTRICULAR RATE: 95 BPM
EKG VENTRICULAR RATE: 95 BPM
ENTEROBACTER CLOACAE COMPLEX BY PCR: NOT DETECTED
ENTEROBACTER CLOACAE COMPLEX BY PCR: NOT DETECTED
ENTEROBACTERALES BY PCR: NOT DETECTED
ENTEROBACTERALES BY PCR: NOT DETECTED
ENTEROCOCCUS FAECALIS BY PCR: NOT DETECTED
ENTEROCOCCUS FAECALIS BY PCR: NOT DETECTED
ENTEROCOCCUS FAECIUM BY PCR: NOT DETECTED
ENTEROCOCCUS FAECIUM BY PCR: NOT DETECTED
EOSINOPHIL # BLD: 0 K/UL (ref 0–0.4)
EOSINOPHIL # BLD: 0 K/UL (ref 0–0.4)
EOSINOPHIL NFR BLD: 0 % (ref 0–7)
EOSINOPHIL NFR BLD: 0 % (ref 0–7)
EPITH CASTS URNS QL MICRO: ABNORMAL /LPF
EPITH CASTS URNS QL MICRO: ABNORMAL /LPF
ERYTHROCYTE [DISTWIDTH] IN BLOOD BY AUTOMATED COUNT: 15.2 % (ref 11.5–14.5)
ERYTHROCYTE [DISTWIDTH] IN BLOOD BY AUTOMATED COUNT: 15.2 % (ref 11.5–14.5)
ESCHERICHIA COLI: NOT DETECTED
ESCHERICHIA COLI: NOT DETECTED
ETHANOL SERPL-MCNC: <10 MG/DL (ref 0–0.08)
ETHANOL SERPL-MCNC: <10 MG/DL (ref 0–0.08)
FIO2 ON VENT: 55 %
FIO2 ON VENT: 55 %
FIO2 ON VENT: 60 %
FIO2 ON VENT: 60 %
GAS FLOW.O2 SETTING OXYMISER: 12
GLOBULIN SER CALC-MCNC: 3.6 G/DL (ref 2–4)
GLOBULIN SER CALC-MCNC: 3.6 G/DL (ref 2–4)
GLUCOSE BLD STRIP.AUTO-MCNC: 102 MG/DL (ref 65–100)
GLUCOSE BLD STRIP.AUTO-MCNC: 102 MG/DL (ref 65–100)
GLUCOSE BLD STRIP.AUTO-MCNC: 113 MG/DL (ref 65–100)
GLUCOSE BLD STRIP.AUTO-MCNC: 113 MG/DL (ref 65–100)
GLUCOSE BLD STRIP.AUTO-MCNC: 74 MG/DL (ref 65–100)
GLUCOSE BLD STRIP.AUTO-MCNC: 74 MG/DL (ref 65–100)
GLUCOSE SERPL-MCNC: 111 MG/DL (ref 65–100)
GLUCOSE SERPL-MCNC: 111 MG/DL (ref 65–100)
GLUCOSE SERPL-MCNC: 160 MG/DL (ref 65–100)
GLUCOSE SERPL-MCNC: 160 MG/DL (ref 65–100)
GLUCOSE SERPL-MCNC: 217 MG/DL (ref 65–100)
GLUCOSE SERPL-MCNC: 217 MG/DL (ref 65–100)
GLUCOSE UR STRIP.AUTO-MCNC: NEGATIVE MG/DL
GLUCOSE UR STRIP.AUTO-MCNC: NEGATIVE MG/DL
HAEM INFLU DNA BLD POS QL NAA+NON-PROBE: NOT DETECTED
HAEM INFLU DNA BLD POS QL NAA+NON-PROBE: NOT DETECTED
HCO3 BLDA-SCNC: 17 MMOL/L (ref 22–26)
HCT VFR BLD AUTO: 45.5 % (ref 36.6–50.3)
HCT VFR BLD AUTO: 45.5 % (ref 36.6–50.3)
HGB BLD-MCNC: 14.4 G/DL (ref 12.1–17)
HGB BLD-MCNC: 14.4 G/DL (ref 12.1–17)
HGB UR QL STRIP: ABNORMAL
HGB UR QL STRIP: ABNORMAL
IMM GRANULOCYTES # BLD AUTO: 0.32 K/UL (ref 0–0.04)
IMM GRANULOCYTES # BLD AUTO: 0.32 K/UL (ref 0–0.04)
IMM GRANULOCYTES NFR BLD AUTO: 1.8 % (ref 0–0.5)
IMM GRANULOCYTES NFR BLD AUTO: 1.8 % (ref 0–0.5)
INR PPP: 1.4 (ref 0.9–1.1)
INR PPP: 1.4 (ref 0.9–1.1)
KETONES UR QL STRIP.AUTO: 5 MG/DL
KETONES UR QL STRIP.AUTO: 5 MG/DL
KLEBSIELLA AEROGENES BY PCR: NOT DETECTED
KLEBSIELLA AEROGENES BY PCR: NOT DETECTED
KLEBSIELLA OXYTOCA BY PCR: NOT DETECTED
KLEBSIELLA OXYTOCA BY PCR: NOT DETECTED
KLEBSIELLA PNEUMONIAE GROUP BY PCR: NOT DETECTED
KLEBSIELLA PNEUMONIAE GROUP BY PCR: NOT DETECTED
LACTATE SERPL-SCNC: 2.7 MMOL/L (ref 0.4–2)
LACTATE SERPL-SCNC: 2.7 MMOL/L (ref 0.4–2)
LACTATE SERPL-SCNC: 3.1 MMOL/L (ref 0.4–2)
LACTATE SERPL-SCNC: 3.1 MMOL/L (ref 0.4–2)
LACTATE SERPL-SCNC: 3.2 MMOL/L (ref 0.4–2)
LACTATE SERPL-SCNC: 3.5 MMOL/L (ref 0.4–2)
LACTATE SERPL-SCNC: 3.5 MMOL/L (ref 0.4–2)
LEUKOCYTE ESTERASE UR QL STRIP.AUTO: ABNORMAL
LEUKOCYTE ESTERASE UR QL STRIP.AUTO: ABNORMAL
LISTERIA MONOCYTOGENES BY PCR: NOT DETECTED
LISTERIA MONOCYTOGENES BY PCR: NOT DETECTED
LYMPHOCYTES # BLD: 2.46 K/UL (ref 0.8–3.5)
LYMPHOCYTES # BLD: 2.46 K/UL (ref 0.8–3.5)
LYMPHOCYTES NFR BLD: 13.6 % (ref 12–49)
LYMPHOCYTES NFR BLD: 13.6 % (ref 12–49)
Lab: NORMAL
MAGNESIUM SERPL-MCNC: 3.1 MG/DL (ref 1.6–2.4)
MAGNESIUM SERPL-MCNC: 3.1 MG/DL (ref 1.6–2.4)
MAGNESIUM SERPL-MCNC: 3.2 MG/DL (ref 1.6–2.4)
MCH RBC QN AUTO: 31.2 PG (ref 26–34)
MCH RBC QN AUTO: 31.2 PG (ref 26–34)
MCHC RBC AUTO-ENTMCNC: 31.6 G/DL (ref 30–36.5)
MCHC RBC AUTO-ENTMCNC: 31.6 G/DL (ref 30–36.5)
MCV RBC AUTO: 98.7 FL (ref 80–99)
MCV RBC AUTO: 98.7 FL (ref 80–99)
MECA+MECC ISLT/SPM QL: NOT DETECTED
MECA+MECC ISLT/SPM QL: NOT DETECTED
METHADONE UR QL: NEGATIVE
METHADONE UR QL: NEGATIVE
METHGB MFR BLD: 0.5 % (ref 0–1.4)
METHGB MFR BLD: 0.5 % (ref 0–1.4)
METHGB MFR BLD: 0.6 % (ref 0–1.4)
METHGB MFR BLD: 0.6 % (ref 0–1.4)
MONOCYTES # BLD: 0.95 K/UL (ref 0–1)
MONOCYTES # BLD: 0.95 K/UL (ref 0–1)
MONOCYTES NFR BLD: 5.2 % (ref 5–13)
MONOCYTES NFR BLD: 5.2 % (ref 5–13)
MUCOUS THREADS URNS QL MICRO: ABNORMAL /LPF
MUCOUS THREADS URNS QL MICRO: ABNORMAL /LPF
NEISSERIA MENINGITIDIS BY PCR: NOT DETECTED
NEISSERIA MENINGITIDIS BY PCR: NOT DETECTED
NEUTS SEG # BLD: 14.34 K/UL (ref 1.8–8)
NEUTS SEG # BLD: 14.34 K/UL (ref 1.8–8)
NEUTS SEG NFR BLD: 79 % (ref 32–75)
NEUTS SEG NFR BLD: 79 % (ref 32–75)
NITRITE UR QL STRIP.AUTO: NEGATIVE
NITRITE UR QL STRIP.AUTO: NEGATIVE
NRBC # BLD: 0.12 K/UL (ref 0–0.01)
NRBC # BLD: 0.12 K/UL (ref 0–0.01)
NRBC BLD-RTO: 0.7 PER 100 WBC
NRBC BLD-RTO: 0.7 PER 100 WBC
OPIATES UR QL: NEGATIVE
OPIATES UR QL: NEGATIVE
OSMOLALITY UR: 470 MOSM/KG H2O
OSMOLALITY UR: 470 MOSM/KG H2O
OXYHGB MFR BLD: 92.7 % (ref 95–99)
OXYHGB MFR BLD: 92.7 % (ref 95–99)
OXYHGB MFR BLD: 95 % (ref 95–99)
OXYHGB MFR BLD: 95 % (ref 95–99)
PCO2 BLDA: 28 MMHG (ref 35–45)
PCP UR QL: NEGATIVE
PCP UR QL: NEGATIVE
PEEP RESPIRATORY: 7
PERFORMED BY:: ABNORMAL
PERFORMED BY:: NORMAL
PERFORMED BY:: NORMAL
PH BLDA: 7.4 (ref 7.35–7.45)
PH UR STRIP: 7 (ref 5–8)
PH UR STRIP: 7 (ref 5–8)
PHOSPHATE SERPL-MCNC: 4.2 MG/DL (ref 2.6–4.7)
PHOSPHATE SERPL-MCNC: 4.2 MG/DL (ref 2.6–4.7)
PHOSPHATE SERPL-MCNC: 4.4 MG/DL (ref 2.6–4.7)
PHOSPHATE SERPL-MCNC: 4.4 MG/DL (ref 2.6–4.7)
PHOSPHATE SERPL-MCNC: 5 MG/DL (ref 2.6–4.7)
PHOSPHATE SERPL-MCNC: 5 MG/DL (ref 2.6–4.7)
PLATELET # BLD AUTO: 107 K/UL (ref 150–400)
PLATELET # BLD AUTO: 107 K/UL (ref 150–400)
PMV BLD AUTO: 11 FL (ref 8.9–12.9)
PMV BLD AUTO: 11 FL (ref 8.9–12.9)
PO2 BLDA: 70 MMHG (ref 80–100)
PO2 BLDA: 70 MMHG (ref 80–100)
PO2 BLDA: 79 MMHG (ref 80–100)
PO2 BLDA: 79 MMHG (ref 80–100)
POTASSIUM SERPL-SCNC: 4.2 MMOL/L (ref 3.5–5.1)
POTASSIUM SERPL-SCNC: 4.2 MMOL/L (ref 3.5–5.1)
POTASSIUM SERPL-SCNC: 4.5 MMOL/L (ref 3.5–5.1)
POTASSIUM SERPL-SCNC: 4.5 MMOL/L (ref 3.5–5.1)
POTASSIUM SERPL-SCNC: 4.9 MMOL/L (ref 3.5–5.1)
POTASSIUM SERPL-SCNC: 4.9 MMOL/L (ref 3.5–5.1)
POTASSIUM UR-SCNC: 47 MMOL/L
POTASSIUM UR-SCNC: 47 MMOL/L
PROT SERPL-MCNC: 5.6 G/DL (ref 6.4–8.2)
PROT SERPL-MCNC: 5.6 G/DL (ref 6.4–8.2)
PROT UR STRIP-MCNC: 100 MG/DL
PROT UR STRIP-MCNC: 100 MG/DL
PROT UR-MCNC: 101 MG/DL (ref 0–11.9)
PROT UR-MCNC: 101 MG/DL (ref 0–11.9)
PROT/CREAT UR-RTO: 1.5
PROT/CREAT UR-RTO: 1.5
PROTEUS BY PCR: NOT DETECTED
PROTEUS BY PCR: NOT DETECTED
PROTHROMBIN TIME: 17.9 SEC (ref 11.9–14.6)
PROTHROMBIN TIME: 17.9 SEC (ref 11.9–14.6)
PSEUDOMONAS AERUGINOSA, PSAEP: NOT DETECTED
PSEUDOMONAS AERUGINOSA, PSAEP: NOT DETECTED
RBC # BLD AUTO: 4.61 M/UL (ref 4.1–5.7)
RBC # BLD AUTO: 4.61 M/UL (ref 4.1–5.7)
RBC #/AREA URNS HPF: >100 /HPF (ref 0–5)
RBC #/AREA URNS HPF: >100 /HPF (ref 0–5)
RESISTANT GENE TARGETS: ABNORMAL
RESISTANT GENE TARGETS: ABNORMAL
SALMONELLA DNA BLD POS QL NAA+NON-PROBE: NOT DETECTED
SALMONELLA DNA BLD POS QL NAA+NON-PROBE: NOT DETECTED
SAO2 % BLD: 93 % (ref 95–99)
SAO2 % BLD: 93 % (ref 95–99)
SAO2 % BLD: 96 % (ref 95–99)
SAO2 % BLD: 96 % (ref 95–99)
SAO2% DEVICE SAO2% SENSOR NAME: ABNORMAL
SERRATIA MARCESCENS BY PCR: NOT DETECTED
SERRATIA MARCESCENS BY PCR: NOT DETECTED
SODIUM SERPL-SCNC: 153 MMOL/L (ref 136–145)
SODIUM SERPL-SCNC: 153 MMOL/L (ref 136–145)
SODIUM SERPL-SCNC: 156 MMOL/L (ref 136–145)
SODIUM SERPL-SCNC: 156 MMOL/L (ref 136–145)
SODIUM SERPL-SCNC: 158 MMOL/L (ref 136–145)
SODIUM SERPL-SCNC: 158 MMOL/L (ref 136–145)
SODIUM UR-SCNC: 41 MMOL/L
SODIUM UR-SCNC: 41 MMOL/L
SP GR UR REFRACTOMETRY: 1.01 (ref 1–1.03)
SP GR UR REFRACTOMETRY: 1.01 (ref 1–1.03)
SPECIMEN SITE: ABNORMAL
STAPHYLOCOCCUS AUREUS: NOT DETECTED
STAPHYLOCOCCUS AUREUS: NOT DETECTED
STAPHYLOCOCCUS EPIDERMIDIS BY PCR: NOT DETECTED
STAPHYLOCOCCUS EPIDERMIDIS BY PCR: NOT DETECTED
STAPHYLOCOCCUS LUGDUNENSIS BY PCR: DETECTED
STAPHYLOCOCCUS LUGDUNENSIS BY PCR: DETECTED
STAPHYLOCOCCUS: DETECTED
STAPHYLOCOCCUS: DETECTED
STENOTROPHOMONAS MALTOPHILIA BY PCR: NOT DETECTED
STENOTROPHOMONAS MALTOPHILIA BY PCR: NOT DETECTED
STREPTOCOCCUS AGALACTIAE (GROUP B): NOT DETECTED
STREPTOCOCCUS AGALACTIAE (GROUP B): NOT DETECTED
STREPTOCOCCUS PNEUMONIAE , SPNP: NOT DETECTED
STREPTOCOCCUS PNEUMONIAE , SPNP: NOT DETECTED
STREPTOCOCCUS PYOGENES (GROUP A), SPYOP: NOT DETECTED
STREPTOCOCCUS PYOGENES (GROUP A), SPYOP: NOT DETECTED
STREPTOCOCCUS: NOT DETECTED
STREPTOCOCCUS: NOT DETECTED
T4 FREE SERPL-MCNC: 1.3 NG/DL (ref 0.8–1.5)
T4 FREE SERPL-MCNC: 1.3 NG/DL (ref 0.8–1.5)
TRI-PHOS CRY URNS QL MICRO: ABNORMAL
TRI-PHOS CRY URNS QL MICRO: ABNORMAL
TSH SERPL DL<=0.05 MIU/L-ACNC: 2.55 UIU/ML (ref 0.36–3.74)
TSH SERPL DL<=0.05 MIU/L-ACNC: 2.55 UIU/ML (ref 0.36–3.74)
URINE CULTURE IF INDICATED: ABNORMAL
URINE CULTURE IF INDICATED: ABNORMAL
UROBILINOGEN UR QL STRIP.AUTO: 0.1 EU/DL (ref 0.1–1)
UROBILINOGEN UR QL STRIP.AUTO: 0.1 EU/DL (ref 0.1–1)
VENTILATION MODE VENT: ABNORMAL
VT SETTING VENT: 410
VT SETTING VENT: 410
VT SETTING VENT: 430
VT SETTING VENT: 430
WBC # BLD AUTO: 18.1 K/UL (ref 4.1–11.1)
WBC # BLD AUTO: 18.1 K/UL (ref 4.1–11.1)
WBC URNS QL MICRO: >100 /HPF (ref 0–4)
WBC URNS QL MICRO: >100 /HPF (ref 0–4)

## 2025-04-03 PROCEDURE — 80048 BASIC METABOLIC PNL TOTAL CA: CPT

## 2025-04-03 PROCEDURE — 82436 ASSAY OF URINE CHLORIDE: CPT

## 2025-04-03 PROCEDURE — 85025 COMPLETE CBC W/AUTO DIFF WBC: CPT

## 2025-04-03 PROCEDURE — 71045 X-RAY EXAM CHEST 1 VIEW: CPT

## 2025-04-03 PROCEDURE — 6360000002 HC RX W HCPCS: Performed by: INTERNAL MEDICINE

## 2025-04-03 PROCEDURE — 82570 ASSAY OF URINE CREATININE: CPT

## 2025-04-03 PROCEDURE — 83605 ASSAY OF LACTIC ACID: CPT

## 2025-04-03 PROCEDURE — 74018 RADEX ABDOMEN 1 VIEW: CPT

## 2025-04-03 PROCEDURE — 87086 URINE CULTURE/COLONY COUNT: CPT

## 2025-04-03 PROCEDURE — 80307 DRUG TEST PRSMV CHEM ANLYZR: CPT

## 2025-04-03 PROCEDURE — 6360000004 HC RX CONTRAST MEDICATION

## 2025-04-03 PROCEDURE — 2500000003 HC RX 250 WO HCPCS: Performed by: INTERNAL MEDICINE

## 2025-04-03 PROCEDURE — 84300 ASSAY OF URINE SODIUM: CPT

## 2025-04-03 PROCEDURE — 2580000003 HC RX 258: Performed by: STUDENT IN AN ORGANIZED HEALTH CARE EDUCATION/TRAINING PROGRAM

## 2025-04-03 PROCEDURE — 93306 TTE W/DOPPLER COMPLETE: CPT

## 2025-04-03 PROCEDURE — 82140 ASSAY OF AMMONIA: CPT

## 2025-04-03 PROCEDURE — 84156 ASSAY OF PROTEIN URINE: CPT

## 2025-04-03 PROCEDURE — 84443 ASSAY THYROID STIM HORMONE: CPT

## 2025-04-03 PROCEDURE — 2580000003 HC RX 258: Performed by: INTERNAL MEDICINE

## 2025-04-03 PROCEDURE — 84133 ASSAY OF URINE POTASSIUM: CPT

## 2025-04-03 PROCEDURE — 82803 BLOOD GASES ANY COMBINATION: CPT

## 2025-04-03 PROCEDURE — 84439 ASSAY OF FREE THYROXINE: CPT

## 2025-04-03 PROCEDURE — 6370000000 HC RX 637 (ALT 250 FOR IP): Performed by: STUDENT IN AN ORGANIZED HEALTH CARE EDUCATION/TRAINING PROGRAM

## 2025-04-03 PROCEDURE — 87641 MR-STAPH DNA AMP PROBE: CPT

## 2025-04-03 PROCEDURE — 82962 GLUCOSE BLOOD TEST: CPT

## 2025-04-03 PROCEDURE — 2700000000 HC OXYGEN THERAPY PER DAY

## 2025-04-03 PROCEDURE — 82077 ASSAY SPEC XCP UR&BREATH IA: CPT

## 2025-04-03 PROCEDURE — 2000000000 HC ICU R&B

## 2025-04-03 PROCEDURE — 84100 ASSAY OF PHOSPHORUS: CPT

## 2025-04-03 PROCEDURE — 85610 PROTHROMBIN TIME: CPT

## 2025-04-03 PROCEDURE — 81001 URINALYSIS AUTO W/SCOPE: CPT

## 2025-04-03 PROCEDURE — 51798 US URINE CAPACITY MEASURE: CPT

## 2025-04-03 PROCEDURE — 82533 TOTAL CORTISOL: CPT

## 2025-04-03 PROCEDURE — 6360000002 HC RX W HCPCS: Performed by: STUDENT IN AN ORGANIZED HEALTH CARE EDUCATION/TRAINING PROGRAM

## 2025-04-03 PROCEDURE — 82550 ASSAY OF CK (CPK): CPT

## 2025-04-03 PROCEDURE — 94003 VENT MGMT INPAT SUBQ DAY: CPT

## 2025-04-03 PROCEDURE — 51701 INSERT BLADDER CATHETER: CPT

## 2025-04-03 PROCEDURE — 82330 ASSAY OF CALCIUM: CPT

## 2025-04-03 PROCEDURE — 80053 COMPREHEN METABOLIC PANEL: CPT

## 2025-04-03 PROCEDURE — 94761 N-INVAS EAR/PLS OXIMETRY MLT: CPT

## 2025-04-03 PROCEDURE — 36600 WITHDRAWAL OF ARTERIAL BLOOD: CPT

## 2025-04-03 PROCEDURE — 2500000003 HC RX 250 WO HCPCS: Performed by: EMERGENCY MEDICINE

## 2025-04-03 PROCEDURE — 83935 ASSAY OF URINE OSMOLALITY: CPT

## 2025-04-03 PROCEDURE — 83735 ASSAY OF MAGNESIUM: CPT

## 2025-04-03 RX ORDER — PROPOFOL 10 MG/ML
5-50 INJECTION, EMULSION INTRAVENOUS CONTINUOUS
Status: DISCONTINUED | OUTPATIENT
Start: 2025-04-03 | End: 2025-04-09

## 2025-04-03 RX ORDER — GLUCAGON 1 MG/ML
1 KIT INJECTION PRN
Status: DISCONTINUED | OUTPATIENT
Start: 2025-04-03 | End: 2025-04-11

## 2025-04-03 RX ORDER — INSULIN LISPRO 100 [IU]/ML
0-8 INJECTION, SOLUTION INTRAVENOUS; SUBCUTANEOUS
Status: DISCONTINUED | OUTPATIENT
Start: 2025-04-03 | End: 2025-04-09

## 2025-04-03 RX ORDER — THIAMINE HYDROCHLORIDE 100 MG/ML
200 INJECTION, SOLUTION INTRAMUSCULAR; INTRAVENOUS EVERY 8 HOURS
Status: COMPLETED | OUTPATIENT
Start: 2025-04-03 | End: 2025-04-05

## 2025-04-03 RX ORDER — HYDROCORTISONE SODIUM SUCCINATE 100 MG/2ML
50 INJECTION INTRAMUSCULAR; INTRAVENOUS EVERY 6 HOURS
Status: DISCONTINUED | OUTPATIENT
Start: 2025-04-03 | End: 2025-04-03

## 2025-04-03 RX ORDER — HYDROCORTISONE SODIUM SUCCINATE 100 MG/2ML
50 INJECTION INTRAMUSCULAR; INTRAVENOUS EVERY 6 HOURS
Status: DISCONTINUED | OUTPATIENT
Start: 2025-04-03 | End: 2025-04-05

## 2025-04-03 RX ORDER — CASTOR OIL AND BALSAM, PERU 788; 87 MG/G; MG/G
OINTMENT TOPICAL 2 TIMES DAILY
Status: DISCONTINUED | OUTPATIENT
Start: 2025-04-03 | End: 2025-04-09

## 2025-04-03 RX ORDER — THIAMINE HYDROCHLORIDE 100 MG/ML
100 INJECTION, SOLUTION INTRAMUSCULAR; INTRAVENOUS EVERY 8 HOURS
Status: DISCONTINUED | OUTPATIENT
Start: 2025-04-05 | End: 2025-04-05

## 2025-04-03 RX ORDER — POLYETHYLENE GLYCOL 3350 17 G/17G
17 POWDER, FOR SOLUTION ORAL DAILY
Status: DISCONTINUED | OUTPATIENT
Start: 2025-04-03 | End: 2025-04-09

## 2025-04-03 RX ORDER — DEXTROSE MONOHYDRATE 100 MG/ML
INJECTION, SOLUTION INTRAVENOUS CONTINUOUS PRN
Status: DISCONTINUED | OUTPATIENT
Start: 2025-04-03 | End: 2025-04-11

## 2025-04-03 RX ORDER — CALCIUM GLUCONATE 20 MG/ML
2000 INJECTION, SOLUTION INTRAVENOUS ONCE
Status: COMPLETED | OUTPATIENT
Start: 2025-04-03 | End: 2025-04-04

## 2025-04-03 RX ORDER — DEXTROSE MONOHYDRATE 50 MG/ML
100 INJECTION, SOLUTION INTRAVENOUS CONTINUOUS
Status: DISCONTINUED | OUTPATIENT
Start: 2025-04-03 | End: 2025-04-04

## 2025-04-03 RX ORDER — HEPARIN SODIUM 5000 [USP'U]/ML
5000 INJECTION, SOLUTION INTRAVENOUS; SUBCUTANEOUS EVERY 8 HOURS SCHEDULED
Status: DISCONTINUED | OUTPATIENT
Start: 2025-04-03 | End: 2025-04-06

## 2025-04-03 RX ORDER — FENTANYL CITRATE 50 UG/ML
50 INJECTION, SOLUTION INTRAMUSCULAR; INTRAVENOUS
Status: DISPENSED | OUTPATIENT
Start: 2025-04-03 | End: 2025-04-06

## 2025-04-03 RX ADMIN — THIAMINE HYDROCHLORIDE 200 MG: 100 INJECTION, SOLUTION INTRAMUSCULAR; INTRAVENOUS at 14:46

## 2025-04-03 RX ADMIN — HEPARIN SODIUM 5000 UNITS: 5000 INJECTION INTRAVENOUS; SUBCUTANEOUS at 13:56

## 2025-04-03 RX ADMIN — SODIUM CHLORIDE, SODIUM LACTATE, POTASSIUM CHLORIDE, AND CALCIUM CHLORIDE 2000 ML: .6; .31; .03; .02 INJECTION, SOLUTION INTRAVENOUS at 00:42

## 2025-04-03 RX ADMIN — DEXTROSE 100 ML/HR: 5 SOLUTION INTRAVENOUS at 12:42

## 2025-04-03 RX ADMIN — THIAMINE HYDROCHLORIDE 200 MG: 100 INJECTION, SOLUTION INTRAMUSCULAR; INTRAVENOUS at 21:06

## 2025-04-03 RX ADMIN — Medication: at 22:09

## 2025-04-03 RX ADMIN — PROPOFOL 20 MCG/KG/MIN: 10 INJECTION, EMULSION INTRAVENOUS at 13:53

## 2025-04-03 RX ADMIN — HYDROCORTISONE SODIUM SUCCINATE 50 MG: 100 INJECTION, POWDER, FOR SOLUTION INTRAMUSCULAR; INTRAVENOUS at 21:06

## 2025-04-03 RX ADMIN — CALCIUM GLUCONATE 2000 MG: 20 INJECTION, SOLUTION INTRAVENOUS at 22:08

## 2025-04-03 RX ADMIN — CEFTRIAXONE SODIUM 1000 MG: 1 INJECTION, POWDER, FOR SOLUTION INTRAMUSCULAR; INTRAVENOUS at 06:03

## 2025-04-03 RX ADMIN — Medication 12 MCG/MIN: at 19:37

## 2025-04-03 RX ADMIN — SULFUR HEXAFLUORIDE 2 ML: 60.7; .19; .19 INJECTION, POWDER, LYOPHILIZED, FOR SUSPENSION INTRAVENOUS; INTRAVESICAL at 17:07

## 2025-04-03 RX ADMIN — SODIUM CHLORIDE, SODIUM LACTATE, POTASSIUM CHLORIDE, AND CALCIUM CHLORIDE: .6; .31; .03; .02 INJECTION, SOLUTION INTRAVENOUS at 08:45

## 2025-04-03 RX ADMIN — HYDROCORTISONE SODIUM SUCCINATE 50 MG: 100 INJECTION, POWDER, FOR SOLUTION INTRAMUSCULAR; INTRAVENOUS at 13:56

## 2025-04-03 RX ADMIN — HYDROCORTISONE SODIUM SUCCINATE 100 MG: 100 INJECTION, POWDER, FOR SOLUTION INTRAMUSCULAR; INTRAVENOUS at 00:34

## 2025-04-03 RX ADMIN — HEPARIN SODIUM 5000 UNITS: 5000 INJECTION INTRAVENOUS; SUBCUTANEOUS at 22:09

## 2025-04-03 RX ADMIN — SODIUM CHLORIDE, PRESERVATIVE FREE 20 MG: 5 INJECTION INTRAVENOUS at 10:37

## 2025-04-03 RX ADMIN — HYDROCORTISONE SODIUM SUCCINATE 50 MG: 100 INJECTION, POWDER, FOR SOLUTION INTRAMUSCULAR; INTRAVENOUS at 10:02

## 2025-04-03 ASSESSMENT — PULMONARY FUNCTION TESTS
PIF_VALUE: 15
PIF_VALUE: 18
PIF_VALUE: 16
PIF_VALUE: 16
PIF_VALUE: 15
PIF_VALUE: 16
PIF_VALUE: 15
PIF_VALUE: 17
PIF_VALUE: 16
PIF_VALUE: 19
PIF_VALUE: 16
PIF_VALUE: 14
PIF_VALUE: 16
PIF_VALUE: 16
PIF_VALUE: 15
PIF_VALUE: 15
PIF_VALUE: 16
PIF_VALUE: 13
PIF_VALUE: 18
PIF_VALUE: 13
PIF_VALUE: 16
PIF_VALUE: 17
PIF_VALUE: 16
PIF_VALUE: 18
PIF_VALUE: 15
PIF_VALUE: 17
PIF_VALUE: 16
PIF_VALUE: 16
PIF_VALUE: 17
PIF_VALUE: 16
PIF_VALUE: 16
PIF_VALUE: 20
PIF_VALUE: 16

## 2025-04-03 ASSESSMENT — LIFESTYLE VARIABLES
HOW MANY STANDARD DRINKS CONTAINING ALCOHOL DO YOU HAVE ON A TYPICAL DAY: PATIENT UNABLE TO ANSWER
HOW OFTEN DO YOU HAVE A DRINK CONTAINING ALCOHOL: PATIENT UNABLE TO ANSWER

## 2025-04-03 NOTE — PROGRESS NOTES
Vancomycin Dosing Consult  Dy Krista Hamm is a 89 y.o. male with bloodstream infection. Pharmacy was consulted by Dr. Mancilla to dose and monitor vancomycin. Today is day 1.    Antibiotic regimen: Vancomycin + Ceftriaxone    Temp (24hrs), Av.1 °F (36.7 °C), Min:95.1 °F (35.1 °C), Max:99.3 °F (37.4 °C)    Recent Labs     25  1703 25  1725 25  0800   WBC  --  19.5* 18.1*   CREATININE 1.84* 4.49* 3.98*   BUN  --  152* 142*     Est CrCl: ~15 mL/min (calculated with 81 yo age since entered as a [employer] currently)  Concomitant nephrotoxic drugs: Vasopressors    Cultures:    Blood: PCR pending, GPC in clusters growing in 2/4 bottles, prelim  4/3 Urine: pending    MRSA Swab: Pending    Target range: Re-dose for random level <15 mcg/mL    Recent level history:  Date/Time Dose & Interval Measured Level (mcg/mL) Associated AUC/LONNIE              Assessment/Plan:   GPC growing in 2/4 bottles, leukocytosis  Pulse dose for now since baseline Scr unknown  Received Vancomycin 1750 mg x 1 dose on  @   Schedule level for  @ 0600  Antimicrobial stop date 14 days

## 2025-04-03 NOTE — WOUND CARE
IP WOUND CONSULT    Jerel Hamm  MEDICAL RECORD NUMBER:  [id number]  AGE: 89 y.o.   GENDER: male  : 1880  TODAY'S DATE:  4/3/2025    GENERAL     [] Follow-up   [x] New Consult    Jerel Hamm is a 89 y.o. male referred by:   [x] Physician  [] Nursing  [] Other:         PAST MEDICAL HISTORY    No past medical history on file.     PAST SURGICAL HISTORY    No past surgical history on file.    FAMILY HISTORY    No family history on file.      ALLERGIES    Not on File    MEDICATIONS    No current facility-administered medications on file prior to encounter.     No current outpatient medications on file prior to encounter.          BP (!) 84/61   Pulse 90   Temp 98.8 °F (37.1 °C)   Resp 20   Ht 1.727 m (5' 8\")   Wt 73.1 kg (161 lb 1.6 oz)   SpO2 92%   BMI 24.50 kg/m²     Lab Results   Component Value Date/Time    WBC 18.1 (H) 2025 08:00 AM    POCGLU 113 (H) 2025 01:43 PM    POCGLU 69 2025 05:03 PM    HGB 14.4 2025 08:00 AM    HCT 45.5 2025 08:00 AM     (L) 2025 08:00 AM     ADULT TUBE FEEDING; Nasogastric; Other Tube Feeding (specify); Promote; Continuous; 15; Yes; 10; Q 12 hours; 25; 200; Q 3 hours; Protein; 2 Doses; BID  ADULT ORAL NUTRITION SUPPLEMENT; Breakfast, Dinner; Wound Healing Oral Supplement     Rodolfo BID    ASSESSMENT     Wound Identification & Type: several POA, see below  Dressing change: see flow chart    Contributing Factors: decreased mobility and shear force    Wound 25 Face Right (Active)   Wound Image   25 1540   Wound Etiology Pressure Unstageable 25 1540   Dressing/Treatment Open to air 25 1540   Wound Assessment Eschar dry 25 1540   Drainage Amount None (dry) 25 1540   Odor None 25 1540   Laverne-wound Assessment Denuded 25 1540   Margins Attached edges 25 1540   Number of days: 0       Wound 25 Jaw Right (Active)   Wound Image   25 1540   Wound Etiology Pressure  for shift integumentary assessment and re-secure.  Maintain the external  incontinence management system to manage  incontinence.  Use foam wedge to turn q2h at 30 degree angle to offload sacrum.  Maintain heel boots on both feet while in bed for offloading of the heels.  Maintain HOB at 30 degrees or less, if not contraindicated, to reduce pressure to buttocks and sacrum.  Raise foot of bed to help prevent friction and shear injury from sliding down in the bed.  Will continue to follow weekly while in-patient.     Discharge Wound Care Needs: see above    Teaching completed with:   [] Patient           [] Family member       [] Caregiver          [] Nursing  [] Other    Patient/Caregiver Teaching:  Level of patient/caregiver understanding able to:   [] Indicates understanding       [] Needs reinforcement  [] Unsuccessful      [] Verbal Understanding  [] Demonstrated understanding       [] No evidence of learning  [] Refused teaching         [] N/A       Electronically signed by Nancy Liz RN on 4/3/2025 at 4:12 PM

## 2025-04-03 NOTE — PROGRESS NOTES
[facility] Progress Note        Jerel Velasco Xxhavana  1/1/1880  [id number]  4/3/2025      Brief HPI / Hospital Course:  Patient's real name is Mr. Phani Rossi, 85-year-old male.  Patient had been found unresponsive in his apartment (unsure why emergency providers were called to his apartment) and brought in to [facility] ED.  Patient had been intubated for respiratory failure and encephalopathy.  Unsure of downtime.  Admitted to ICU thereafter for further evaluation management.  Due to circulatory shock pressors and stress with steroids initiated.    4/3: Clarified patient identity.  Otherwise remains encephalopathic and critically ill.  Of note, blood cultures positive for gram-positive cocci on preliminary results.    Assessment and plan:  Neuro  Acute metabolic encephalopathy  - Encephalopathy workup pending including UDS, EtOH, ammonia  - Unsure of what led to patient being found down.  - Head CT unremarkable  - Mild sedation for ventilator compliance  - Daily SAT SBT    CV  Septic shock  - Pressors ongoing along with stress dose steroids.  Will wean as tolerated  - TTE pending    Pulmonary  Acute hypoxic respiratory failure  - Continue lung protective mechanical ventilation.  Daily SAT SBT and wean as tolerated    Renal /  MEGHANA  Hypernatremia  Lactic acidosis  UTI  Azotemia vs uremia  - Dextrose infusion along with tube feeds with increased free water flushes to address hypernatremia  - MEGHANA slowly improving  - Monitoring CK  - If renal function unimproved may require HD for uremia    GI  Mild hyperammonemia  - consider lactulose if worsening    Endocrine  - On stress dose steroids  - SSI with Accu-Cheks    Heme-onc  No acute issues    ID  Gram-positive cocci bacteremia  UTI  - ceftriaxone  - adjust abx to culture data, final results pending      ICU DAILY CHECKLIST     Code Status: Full No Order  DVT Prophylaxis: UFH 5000 U q8h  T/L/D: PIV  GI ppx: PPI  Diet:  ADULT TUBE FEEDING; Nasogastric; Other  Tube Feeding (specify); Promote; Continuous; 10; Yes; 10; Q 12 hours; 40; 200; Q 3 hours; Protein; 2 Doses; BID  Activity Level: bedrest   ABCDEF Bundle/Checklist Completed: Yes  Disposition: ICU  Multidisciplinary Rounds Completed: Yes  Goals of Care Discussion/Palliative:  Yes  Patient/Family Updated: Yes      OBJECTIVE  Vitals:    04/03/25 0750 04/03/25 1100 04/03/25 1119 04/03/25 1232   BP:  (!) 141/101     Pulse: (!) 104 (!) 108 (!) 106    Resp: 30 26 (!) 34    Temp:  98.7 °F (37.1 °C)     TempSrc:       SpO2: 100% 96% 95%    Weight:       Height:    1.727 m (5' 8\")       EXAM:   Physical Exam  Vitals reviewed.   Constitutional:       Appearance: He is not ill-appearing or diaphoretic.   HENT:      Head: Normocephalic and atraumatic.      Nose: Nose normal.      Mouth/Throat:      Mouth: Mucous membranes are moist.   Eyes:      Extraocular Movements: Extraocular movements intact.      Pupils: Pupils are equal, round, and reactive to light.   Cardiovascular:      Rate and Rhythm: Normal rate and regular rhythm.      Pulses: Normal pulses.      Heart sounds: Normal heart sounds. No murmur heard.  Pulmonary:      Effort: Pulmonary effort is normal. No respiratory distress.      Breath sounds: Normal breath sounds. No wheezing, rhonchi or rales.   Abdominal:      General: Bowel sounds are normal. There is no distension.      Palpations: Abdomen is soft.      Tenderness: There is no abdominal tenderness. There is no guarding or rebound.   Musculoskeletal:         General: No swelling or deformity. Normal range of motion.      Cervical back: Normal range of motion. No rigidity.   Skin:     General: Skin is warm and dry.      Capillary Refill: Capillary refill takes less than 2 seconds.   Neurological:      General: No focal deficit present.      Mental Status: He is alert and oriented to person, place, and time. Mental status is at baseline.   Psychiatric:         Mood and Affect: Mood normal.         Behavior:

## 2025-04-03 NOTE — PROGRESS NOTES
Nutrition Note     Type and Reason for Visit: TF Modification    Pt initiated on propofol @ 25 (290 kcals/d). Adjust goal TF rate:    Promote @ 15 ml/hr and advance 10ml/hr after 12 hours to a goal rate of 25 ml/hr+2 Prosource BID. Administer 200ml free water every 3 hours per MD.  TF @ goal provides: 600 kcals, 38 g pro, and 499 ml free water  Prosource x4: +160 kcals, +44 g pro  D5 IVF: +408 kcals  Propofol @ 25: +290 kcals  TOTAL: 1458 kcals (20 kcals/kg/102%PSU), 82 g pro (1.1 g/kg), and 2099 ml H2O     Viji Louis RD  Contact: [phone]

## 2025-04-03 NOTE — ED NOTES
Spoke with [facility].  States that the name they received for a possible identification for the patient is Phani Rossi, age 85.

## 2025-04-03 NOTE — CONSULTS
Comprehensive Nutrition Assessment    Type and Reason for Visit:  Initial, Consult, Nutrition support    Nutrition Recommendations/Plan:  Once enteral access obtained and position confirmed via imaging, start TF w/ Promote @ 10 ml/hr and advance 10ml/hr every 12 hours to a goal rate of 40 ml/hr+2 Prosource BID. Administer 200ml free water every 3 hours per MD.  TF @ goal provides: 960 kcals, 61 g pro, and 798 ml free water  Prosource x4: +160 kcals, +44 g pro  D5 IVF: +408 kcals  TOTAL: 1488 kcals (20 kcals/kg/104%PSU), 94 g pro (1.3 g/kg), and 2398 ml H2O  Monitor lytes closely and correct as needed  Monitor weight, fluid status, labs, and bowel fxn/abd exam  Monitor for increasing pressor needs; consider holding/trophic feeds for incr pressor needs     Malnutrition Assessment:  Malnutrition Status:  At risk for malnutrition (04/03/25 1250)    Context:  Acute Illness     Findings of the 6 clinical characteristics of malnutrition:  Energy Intake:  Mild decrease in energy intake (at least, MARBIN d/t pt on vent)  Weight Loss:  Unable to assess     Body Fat Loss:  No body fat loss     Muscle Mass Loss:  Mild muscle mass loss Temples (temporalis), Clavicles (pectoralis & deltoids)  Fluid Accumulation:  Mild (acute illness>nutr status)     Strength:  Not Performed    Nutrition Assessment:    Presented via EMS after being found down x2-3 days. Admitted to ICU w/ multi-organ failure, intubated on levo. Pt undergoing cerebell. More awake, likely to require sedation. Per MDR discussion, plasn to place OGT and start TF. Labs: lactic 3.2, Na 158, , Cr 3.98, Mg 3.2, Phos 5.0. Meds: ceftriaxone, solu-cortef, SSI, glycolax, thimaine, D5@ 100ml/hr, levo @ 10    Nutrition Related Findings:    Signs of mild-mod muscle wasting which may be age-related vs nutr status. No reported n/v/d/c, LBM PTA. Pitting BUE, nonpitting BLE edema. Wound Type: Multiple       Current Nutrition Intake & Therapies:    Average Meal Intake:

## 2025-04-03 NOTE — H&P
Critical Care Admissions H&P  Chief complaint: Unresponsiveness.  Acute respiratory failure.  Patient is an unknown identified gentleman thought to be in his 80s or so.  Unfortunately no background information about are available.  He was supposedly found down at home estimated 3 days or so based on not being heard from, again not clear who has not heard from him.  He has facial bruising and early maceration.  He was completely unresponsive and in compromised respiratory status and had to be intubated initiated on mechanical ventilation.  Blood pressure was low to start on norepinephrine.  He was seen, he was ventilated.  He was completely unresponsive to any stimulation, he is and was not on any sedation.    Review of systems could not be obtained.  No past medical history on file.  Prior to Admission medications    Not on File         Social History    None        No family history on file.   Vitals:    04/02/25 1952   BP:    Pulse: 83   Resp: 15   Temp:    SpO2: 100%   Intubated, mechanically ventilated.  Unresponsive without any sedation.  Head examination revealed no conjunctival pallor.  Mucous membranes are quite dry.  He had that appeared to be old bruising and maceration on the lower right face and right upper neck.  The neck was supple, flat neck veins.  Chest revealed coarse breath sounds bilaterally with no obvious wheezes or crackles.  Fair to good air entry bilaterally.  Heart sounds are regular mildly tachycardic S1 and S2.  The abdomen was soft, bowel sounds were faint and quite infrequent.  No guarding or rigidity.  Lower extremities showed no edema.  Upper extremities showed no cyanosis.  Skin was quite dry.  There was skin breakdown lateral aspect right knee and medial aspect of left knee.  Scattered wounds.  Small skin tear over the sacrum but no ulcers noted.  He had no large joint erythema or warmth.  Neurological examination could not be  assess the relationship of his unresponsiveness to the other events and which led to the other.  In any case initial CT scan of the head is on revealing.  He is not on sedation.  It still early in the course.  Further workup will be needed.  6.  Elevated troponin.  By no means out of what is expected from his overall situation.  Not particularly indicative of a cardiac event actually with this level of elevation with the rest of the presentation and his overall condition that includes multiple significant abnormalities including the compression and renal failure.    Plan:  1.  Mechanical ventilation, adjust to adequate oxygenation and ventilation starting with adjustment for body weight.  2.  Adequate IV volume resuscitation.  Follow-up urine output and creatinine.  3.  IV ceftriaxone.  Check sputum culture if possible.  4. follow-up x-ray and labs in the morning.  5.  Repeat troponin wants to see the overall trend.  6.  Repeat CPK in the morning to see what the trend is like.  As mentioned, he will need adequate IV fluid resuscitation.  7.  After adjusting the above, will we will continue to follow-up as mental status and responsiveness, further workup including neurology evaluation might be needed in the morning.    Critical care time excluding any procedures 50 minutes.

## 2025-04-03 NOTE — CARE COORDINATION
04/03/25 1433   Service Assessment   Patient Orientation Other (see comment)  (Vent)   Cognition Other (see comment)  (Vent)   History Provided By Medical Record   Primary Caregiver Other (Comment)  (Do not know)   Support Systems Other (Comment)  (Do not know)   Patient's Healthcare Decision Maker is:   (Do not know)   PCP Verified by CM No   Prior Functional Level Other (see comment)  (Do not know)   Current Functional Level Other (see comment)   Can patient return to prior living arrangement Other (see comment)  (Do not know)   Ability to make needs known: Unable   Family able to assist with home care needs: Other (comment)   Would you like for me to discuss the discharge plan with any other family members/significant others, and if so, who? No   Financial Resources None   Community Resources None   Social/Functional History   Lives With Other (Comment)  (Do not know)     Per Pt medicals records Pt is on the vent.

## 2025-04-03 NOTE — CARE COORDINATION
Per IDR    Pt found down at home, they estimate that Pt was down for 2-3 days.    Pt was picked up by EMS, [facility] [place] [facility].     Pt name: Phani Rossi, age 85     Mulitable organ failure.     No wallet, no ID    CM called [place] [facility], spoke with Marciano Tariq stated that Pt was picked up at:  [address]   Name Phani Rossi, guessing age is 85    Marciano stated that a Lizette Chaucer signed for Pt.     CM will asked for a [employer] search.    1:40  Received [employer] search, Mari Jeronimo stated that she is Pt ex-wife, she stated that they got  in 1995.    Mari stated that Pt has a son Phani Rossi, CM asked for another [employer] search to search for Phani.

## 2025-04-03 NOTE — ED NOTES
TRANSFER - OUT REPORT:    Verbal report given to Sara Lanier RN on Dy Krista Hamm  being transferred to  for routine progression of patient care       Report consisted of patient's Situation, Background, Assessment and   Recommendations(SBAR).     Information from the following report(s) Nurse Handoff Report, ED Encounter Summary, ED SBAR, MAR, and Recent Results was reviewed with the receiving nurse.    [place] Fall Assessment:    Presents to emergency department  because of falls (Syncope, seizure, or loss of consciousness): Yes  Age > 70: Yes  Altered Mental Status, Intoxication with alcohol or substance confusion (Disorientation, impaired judgment, poor safety awaremess, or inability to follow instructions): No  Impaired Mobility: Ambulates or transfers with assistive devices or assistance; Unable to ambulate or transer.: No  Nursing Judgement: No          Lines:   Peripheral IV 04/02/25 Left Forearm (Active)       Peripheral IV 04/02/25 Distal;Right Forearm (Active)        Opportunity for questions and clarification was provided.      Patient transported with:  Monitor, Registered Nurse, and Tech, RT

## 2025-04-04 ENCOUNTER — APPOINTMENT (OUTPATIENT)
Facility: HOSPITAL | Age: 85
End: 2025-04-04
Payer: OTHER GOVERNMENT

## 2025-04-04 LAB
ALBUMIN SERPL-MCNC: 1.7 G/DL (ref 3.5–5)
ALBUMIN SERPL-MCNC: 1.7 G/DL (ref 3.5–5)
ALBUMIN/GLOB SERPL: 0.5 (ref 1.1–2.2)
ALBUMIN/GLOB SERPL: 0.5 (ref 1.1–2.2)
ALP SERPL-CCNC: 72 U/L (ref 45–117)
ALP SERPL-CCNC: 72 U/L (ref 45–117)
ALT SERPL-CCNC: 42 U/L (ref 12–78)
ALT SERPL-CCNC: 42 U/L (ref 12–78)
ANION GAP SERPL CALC-SCNC: 7 MMOL/L (ref 2–12)
ANION GAP SERPL CALC-SCNC: 7 MMOL/L (ref 2–12)
ARTERIAL PATENCY WRIST A: YES
ARTERIAL PATENCY WRIST A: YES
AST SERPL W P-5'-P-CCNC: 55 U/L (ref 15–37)
AST SERPL W P-5'-P-CCNC: 55 U/L (ref 15–37)
BACTERIA SPEC CULT: NORMAL
BACTERIA SPEC CULT: NORMAL
BASE DEFICIT BLDA-SCNC: 5 MMOL/L
BASE DEFICIT BLDA-SCNC: 5 MMOL/L
BASOPHILS # BLD: 0 K/UL (ref 0–0.1)
BASOPHILS # BLD: 0 K/UL (ref 0–0.1)
BASOPHILS NFR BLD: 0 % (ref 0–1)
BASOPHILS NFR BLD: 0 % (ref 0–1)
BDY SITE: ABNORMAL
BDY SITE: ABNORMAL
BILIRUB SERPL-MCNC: 0.9 MG/DL (ref 0.2–1)
BILIRUB SERPL-MCNC: 0.9 MG/DL (ref 0.2–1)
BODY TEMPERATURE: 98.6
BODY TEMPERATURE: 98.6
BUN SERPL-MCNC: 148 MG/DL (ref 6–20)
BUN SERPL-MCNC: 148 MG/DL (ref 6–20)
BUN/CREAT SERPL: 41 (ref 12–20)
BUN/CREAT SERPL: 41 (ref 12–20)
CA-I BLD-MCNC: 8.3 MG/DL (ref 8.5–10.1)
CA-I BLD-MCNC: 8.3 MG/DL (ref 8.5–10.1)
CHLORIDE SERPL-SCNC: 123 MMOL/L (ref 97–108)
CHLORIDE SERPL-SCNC: 123 MMOL/L (ref 97–108)
CK SERPL-CCNC: 500 U/L (ref 39–308)
CK SERPL-CCNC: 500 U/L (ref 39–308)
CO2 SERPL-SCNC: 19 MMOL/L (ref 21–32)
CO2 SERPL-SCNC: 19 MMOL/L (ref 21–32)
COHGB MFR BLD: 0.3 % (ref 1–2)
COHGB MFR BLD: 0.3 % (ref 1–2)
CREAT SERPL-MCNC: 3.59 MG/DL (ref 0.7–1.3)
CREAT SERPL-MCNC: 3.59 MG/DL (ref 0.7–1.3)
DATE LAST DOSE: NORMAL
DATE LAST DOSE: NORMAL
DIFFERENTIAL METHOD BLD: ABNORMAL
DIFFERENTIAL METHOD BLD: ABNORMAL
DOSE AMOUNT: NORMAL UNITS
DOSE AMOUNT: NORMAL UNITS
ECHO AV MEAN GRADIENT: 2 MMHG
ECHO AV MEAN GRADIENT: 2 MMHG
ECHO AV MEAN VELOCITY: 0.7 M/S
ECHO AV MEAN VELOCITY: 0.7 M/S
ECHO AV PEAK GRADIENT: 4 MMHG
ECHO AV PEAK GRADIENT: 4 MMHG
ECHO AV PEAK VELOCITY: 1.1 M/S
ECHO AV PEAK VELOCITY: 1.1 M/S
ECHO AV VELOCITY RATIO: 0.82
ECHO AV VELOCITY RATIO: 0.82
ECHO AV VTI: 20.1 CM
ECHO AV VTI: 20.1 CM
ECHO EST RA PRESSURE: 20 MMHG
ECHO EST RA PRESSURE: 20 MMHG
ECHO LV E' LATERAL VELOCITY: 6.42 CM/S
ECHO LV E' LATERAL VELOCITY: 6.42 CM/S
ECHO LV E' SEPTAL VELOCITY: 6.53 CM/S
ECHO LV E' SEPTAL VELOCITY: 6.53 CM/S
ECHO LV EDV A2C: 35 ML
ECHO LV EDV A2C: 35 ML
ECHO LV EDV A4C: 49 ML
ECHO LV EDV A4C: 49 ML
ECHO LV EDV INDEX A4C: 26 ML/M2
ECHO LV EDV INDEX A4C: 26 ML/M2
ECHO LV EDV NDEX A2C: 19 ML/M2
ECHO LV EDV NDEX A2C: 19 ML/M2
ECHO LV EF PHYSICIAN: 42 %
ECHO LV EF PHYSICIAN: 42 %
ECHO LV FRACTIONAL SHORTENING: 23 % (ref 28–44)
ECHO LV FRACTIONAL SHORTENING: 23 % (ref 28–44)
ECHO LV INTERNAL DIMENSION DIASTOLE INDEX: 1.61 CM/M2
ECHO LV INTERNAL DIMENSION DIASTOLE INDEX: 1.61 CM/M2
ECHO LV INTERNAL DIMENSION DIASTOLIC: 3 CM (ref 4.2–5.9)
ECHO LV INTERNAL DIMENSION DIASTOLIC: 3 CM (ref 4.2–5.9)
ECHO LV INTERNAL DIMENSION SYSTOLIC INDEX: 1.24 CM/M2
ECHO LV INTERNAL DIMENSION SYSTOLIC INDEX: 1.24 CM/M2
ECHO LV INTERNAL DIMENSION SYSTOLIC: 2.3 CM
ECHO LV INTERNAL DIMENSION SYSTOLIC: 2.3 CM
ECHO LV IVSD: 0.9 CM (ref 0.6–1)
ECHO LV IVSD: 0.9 CM (ref 0.6–1)
ECHO LV MASS 2D: 82.1 G (ref 88–224)
ECHO LV MASS 2D: 82.1 G (ref 88–224)
ECHO LV MASS INDEX 2D: 44.2 G/M2 (ref 49–115)
ECHO LV MASS INDEX 2D: 44.2 G/M2 (ref 49–115)
ECHO LV POSTERIOR WALL DIASTOLIC: 1.1 CM (ref 0.6–1)
ECHO LV POSTERIOR WALL DIASTOLIC: 1.1 CM (ref 0.6–1)
ECHO LV RELATIVE WALL THICKNESS RATIO: 0.73
ECHO LV RELATIVE WALL THICKNESS RATIO: 0.73
ECHO LVOT AV VTI INDEX: 0.45
ECHO LVOT AV VTI INDEX: 0.45
ECHO LVOT MEAN GRADIENT: 1 MMHG
ECHO LVOT MEAN GRADIENT: 1 MMHG
ECHO LVOT PEAK GRADIENT: 3 MMHG
ECHO LVOT PEAK GRADIENT: 3 MMHG
ECHO LVOT PEAK VELOCITY: 0.9 M/S
ECHO LVOT PEAK VELOCITY: 0.9 M/S
ECHO LVOT VTI: 9 CM
ECHO LVOT VTI: 9 CM
ECHO MV A VELOCITY: 0.95 M/S
ECHO MV A VELOCITY: 0.95 M/S
ECHO MV E DECELERATION TIME (DT): 187 MS
ECHO MV E DECELERATION TIME (DT): 187 MS
ECHO MV E VELOCITY: 1.42 M/S
ECHO MV E VELOCITY: 1.42 M/S
ECHO MV E/A RATIO: 1.49
ECHO MV E/A RATIO: 1.49
ECHO MV E/E' LATERAL: 22.12
ECHO MV E/E' LATERAL: 22.12
ECHO MV E/E' RATIO (AVERAGED): 21.93
ECHO MV E/E' RATIO (AVERAGED): 21.93
ECHO MV E/E' SEPTAL: 21.75
ECHO MV E/E' SEPTAL: 21.75
ECHO MV LVOT VTI INDEX: 2.12
ECHO MV LVOT VTI INDEX: 2.12
ECHO MV MAX VELOCITY: 1.1 M/S
ECHO MV MAX VELOCITY: 1.1 M/S
ECHO MV MEAN GRADIENT: 2 MMHG
ECHO MV MEAN GRADIENT: 2 MMHG
ECHO MV MEAN VELOCITY: 0.7 M/S
ECHO MV MEAN VELOCITY: 0.7 M/S
ECHO MV PEAK GRADIENT: 5 MMHG
ECHO MV PEAK GRADIENT: 5 MMHG
ECHO MV VTI: 19.1 CM
ECHO MV VTI: 19.1 CM
ECHO PV MAX VELOCITY: 0.7 M/S
ECHO PV MAX VELOCITY: 0.7 M/S
ECHO PV MEAN GRADIENT: 1 MMHG
ECHO PV MEAN GRADIENT: 1 MMHG
ECHO PV MEAN VELOCITY: 0.5 M/S
ECHO PV MEAN VELOCITY: 0.5 M/S
ECHO PV PEAK GRADIENT: 2 MMHG
ECHO PV PEAK GRADIENT: 2 MMHG
ECHO PV VTI: 10.3 CM
ECHO PV VTI: 10.3 CM
ECHO RIGHT VENTRICULAR SYSTOLIC PRESSURE (RVSP): 47 MMHG
ECHO RIGHT VENTRICULAR SYSTOLIC PRESSURE (RVSP): 47 MMHG
ECHO RV MID DIMENSION: 2.1 CM
ECHO RV MID DIMENSION: 2.1 CM
ECHO TV REGURGITANT MAX VELOCITY: 2.61 M/S
ECHO TV REGURGITANT MAX VELOCITY: 2.61 M/S
ECHO TV REGURGITANT PEAK GRADIENT: 27 MMHG
ECHO TV REGURGITANT PEAK GRADIENT: 27 MMHG
EOSINOPHIL # BLD: 0 K/UL (ref 0–0.4)
EOSINOPHIL # BLD: 0 K/UL (ref 0–0.4)
EOSINOPHIL NFR BLD: 0 % (ref 0–7)
EOSINOPHIL NFR BLD: 0 % (ref 0–7)
ERYTHROCYTE [DISTWIDTH] IN BLOOD BY AUTOMATED COUNT: 15.1 % (ref 11.5–14.5)
ERYTHROCYTE [DISTWIDTH] IN BLOOD BY AUTOMATED COUNT: 15.1 % (ref 11.5–14.5)
FIO2 ON VENT: 45 %
FIO2 ON VENT: 45 %
GLOBULIN SER CALC-MCNC: 3.2 G/DL (ref 2–4)
GLOBULIN SER CALC-MCNC: 3.2 G/DL (ref 2–4)
GLUCOSE BLD STRIP.AUTO-MCNC: 130 MG/DL (ref 65–100)
GLUCOSE BLD STRIP.AUTO-MCNC: 130 MG/DL (ref 65–100)
GLUCOSE BLD STRIP.AUTO-MCNC: 153 MG/DL (ref 65–100)
GLUCOSE BLD STRIP.AUTO-MCNC: 161 MG/DL (ref 65–100)
GLUCOSE BLD STRIP.AUTO-MCNC: 161 MG/DL (ref 65–100)
GLUCOSE BLD STRIP.AUTO-MCNC: 172 MG/DL (ref 65–100)
GLUCOSE BLD STRIP.AUTO-MCNC: 172 MG/DL (ref 65–100)
GLUCOSE BLD STRIP.AUTO-MCNC: 178 MG/DL (ref 65–100)
GLUCOSE BLD STRIP.AUTO-MCNC: 178 MG/DL (ref 65–100)
GLUCOSE SERPL-MCNC: 201 MG/DL (ref 65–100)
GLUCOSE SERPL-MCNC: 201 MG/DL (ref 65–100)
HCO3 BLDA-SCNC: 17 MMOL/L (ref 22–26)
HCO3 BLDA-SCNC: 17 MMOL/L (ref 22–26)
HCT VFR BLD AUTO: 38.6 % (ref 36.6–50.3)
HCT VFR BLD AUTO: 38.6 % (ref 36.6–50.3)
HGB BLD-MCNC: 12.3 G/DL (ref 12.1–17)
HGB BLD-MCNC: 12.3 G/DL (ref 12.1–17)
IMM GRANULOCYTES # BLD AUTO: 0 K/UL
IMM GRANULOCYTES # BLD AUTO: 0 K/UL
IMM GRANULOCYTES NFR BLD AUTO: 0 %
IMM GRANULOCYTES NFR BLD AUTO: 0 %
LACTATE SERPL-SCNC: 2.5 MMOL/L (ref 0.4–2)
LACTATE SERPL-SCNC: 2.5 MMOL/L (ref 0.4–2)
LACTATE SERPL-SCNC: 2.6 MMOL/L (ref 0.4–2)
LYMPHOCYTES # BLD: 2.2 K/UL (ref 0.8–3.5)
LYMPHOCYTES # BLD: 2.2 K/UL (ref 0.8–3.5)
LYMPHOCYTES NFR BLD: 11 % (ref 12–49)
LYMPHOCYTES NFR BLD: 11 % (ref 12–49)
Lab: NORMAL
Lab: NORMAL
MAGNESIUM SERPL-MCNC: 3.1 MG/DL (ref 1.6–2.4)
MAGNESIUM SERPL-MCNC: 3.1 MG/DL (ref 1.6–2.4)
MCH RBC QN AUTO: 31 PG (ref 26–34)
MCH RBC QN AUTO: 31 PG (ref 26–34)
MCHC RBC AUTO-ENTMCNC: 31.9 G/DL (ref 30–36.5)
MCHC RBC AUTO-ENTMCNC: 31.9 G/DL (ref 30–36.5)
MCV RBC AUTO: 97.2 FL (ref 80–99)
MCV RBC AUTO: 97.2 FL (ref 80–99)
METHGB MFR BLD: 0.6 % (ref 0–1.4)
METHGB MFR BLD: 0.6 % (ref 0–1.4)
MONOCYTES # BLD: 0 K/UL (ref 0–1)
MONOCYTES # BLD: 0 K/UL (ref 0–1)
MONOCYTES NFR BLD: 0 % (ref 5–13)
MONOCYTES NFR BLD: 0 % (ref 5–13)
MRSA DNA SPEC QL NAA+PROBE: DETECTED
MRSA DNA SPEC QL NAA+PROBE: DETECTED
MYELOCYTES NFR BLD MANUAL: 1 %
MYELOCYTES NFR BLD MANUAL: 1 %
NEUTS BAND NFR BLD MANUAL: 1 % (ref 0–6)
NEUTS BAND NFR BLD MANUAL: 1 % (ref 0–6)
NEUTS SEG # BLD: 17.4 K/UL (ref 1.8–8)
NEUTS SEG # BLD: 17.4 K/UL (ref 1.8–8)
NEUTS SEG NFR BLD: 86 % (ref 32–75)
NEUTS SEG NFR BLD: 86 % (ref 32–75)
NRBC # BLD: 0.27 K/UL (ref 0–0.01)
NRBC # BLD: 0.27 K/UL (ref 0–0.01)
NRBC BLD-RTO: 1.3 PER 100 WBC
NRBC BLD-RTO: 1.3 PER 100 WBC
OXYHGB MFR BLD: 97.4 % (ref 95–99)
OXYHGB MFR BLD: 97.4 % (ref 95–99)
PCO2 BLDA: 25 MMHG (ref 35–45)
PCO2 BLDA: 25 MMHG (ref 35–45)
PEEP RESPIRATORY: 5
PEEP RESPIRATORY: 5
PERFORMED BY:: ABNORMAL
PH BLDA: 7.45 (ref 7.35–7.45)
PH BLDA: 7.45 (ref 7.35–7.45)
PHOSPHATE SERPL-MCNC: 4 MG/DL (ref 2.6–4.7)
PHOSPHATE SERPL-MCNC: 4 MG/DL (ref 2.6–4.7)
PLATELET # BLD AUTO: 72 K/UL (ref 150–400)
PLATELET # BLD AUTO: 72 K/UL (ref 150–400)
PMV BLD AUTO: 12.9 FL (ref 8.9–12.9)
PMV BLD AUTO: 12.9 FL (ref 8.9–12.9)
PO2 BLDA: 129 MMHG (ref 80–100)
PO2 BLDA: 129 MMHG (ref 80–100)
POTASSIUM SERPL-SCNC: 4.5 MMOL/L (ref 3.5–5.1)
POTASSIUM SERPL-SCNC: 4.5 MMOL/L (ref 3.5–5.1)
PRESSURE SUPPORT SETTING VENT: 5
PRESSURE SUPPORT SETTING VENT: 5
PROMYELOCYTES NFR BLD MANUAL: 1 %
PROMYELOCYTES NFR BLD MANUAL: 1 %
PROT SERPL-MCNC: 4.9 G/DL (ref 6.4–8.2)
PROT SERPL-MCNC: 4.9 G/DL (ref 6.4–8.2)
RBC # BLD AUTO: 3.97 M/UL (ref 4.1–5.7)
RBC # BLD AUTO: 3.97 M/UL (ref 4.1–5.7)
RBC MORPH BLD: ABNORMAL
SAO2 % BLD: 98 % (ref 95–99)
SAO2 % BLD: 98 % (ref 95–99)
SAO2% DEVICE SAO2% SENSOR NAME: ABNORMAL
SAO2% DEVICE SAO2% SENSOR NAME: ABNORMAL
SODIUM SERPL-SCNC: 149 MMOL/L (ref 136–145)
SODIUM SERPL-SCNC: 149 MMOL/L (ref 136–145)
SPECIMEN SITE: ABNORMAL
SPECIMEN SITE: ABNORMAL
VANCOMYCIN SERPL-MCNC: 15 UG/ML
VANCOMYCIN SERPL-MCNC: 15 UG/ML
WBC # BLD AUTO: 20 K/UL (ref 4.1–11.1)
WBC # BLD AUTO: 20 K/UL (ref 4.1–11.1)

## 2025-04-04 PROCEDURE — 94003 VENT MGMT INPAT SUBQ DAY: CPT

## 2025-04-04 PROCEDURE — 83605 ASSAY OF LACTIC ACID: CPT

## 2025-04-04 PROCEDURE — 51798 US URINE CAPACITY MEASURE: CPT

## 2025-04-04 PROCEDURE — 82803 BLOOD GASES ANY COMBINATION: CPT

## 2025-04-04 PROCEDURE — 2500000003 HC RX 250 WO HCPCS: Performed by: INTERNAL MEDICINE

## 2025-04-04 PROCEDURE — 80053 COMPREHEN METABOLIC PANEL: CPT

## 2025-04-04 PROCEDURE — 2000000000 HC ICU R&B

## 2025-04-04 PROCEDURE — 82962 GLUCOSE BLOOD TEST: CPT

## 2025-04-04 PROCEDURE — 71045 X-RAY EXAM CHEST 1 VIEW: CPT

## 2025-04-04 PROCEDURE — 94761 N-INVAS EAR/PLS OXIMETRY MLT: CPT

## 2025-04-04 PROCEDURE — 82550 ASSAY OF CK (CPK): CPT

## 2025-04-04 PROCEDURE — 80202 ASSAY OF VANCOMYCIN: CPT

## 2025-04-04 PROCEDURE — 36415 COLL VENOUS BLD VENIPUNCTURE: CPT

## 2025-04-04 PROCEDURE — 2580000003 HC RX 258: Performed by: STUDENT IN AN ORGANIZED HEALTH CARE EDUCATION/TRAINING PROGRAM

## 2025-04-04 PROCEDURE — 76770 US EXAM ABDO BACK WALL COMP: CPT

## 2025-04-04 PROCEDURE — 6360000002 HC RX W HCPCS: Performed by: STUDENT IN AN ORGANIZED HEALTH CARE EDUCATION/TRAINING PROGRAM

## 2025-04-04 PROCEDURE — 2500000003 HC RX 250 WO HCPCS: Performed by: STUDENT IN AN ORGANIZED HEALTH CARE EDUCATION/TRAINING PROGRAM

## 2025-04-04 PROCEDURE — 83735 ASSAY OF MAGNESIUM: CPT

## 2025-04-04 PROCEDURE — 36600 WITHDRAWAL OF ARTERIAL BLOOD: CPT

## 2025-04-04 PROCEDURE — 85025 COMPLETE CBC W/AUTO DIFF WBC: CPT

## 2025-04-04 PROCEDURE — 2700000000 HC OXYGEN THERAPY PER DAY

## 2025-04-04 PROCEDURE — 6360000002 HC RX W HCPCS: Performed by: INTERNAL MEDICINE

## 2025-04-04 PROCEDURE — 84100 ASSAY OF PHOSPHORUS: CPT

## 2025-04-04 PROCEDURE — 2580000003 HC RX 258: Performed by: INTERNAL MEDICINE

## 2025-04-04 PROCEDURE — 6370000000 HC RX 637 (ALT 250 FOR IP): Performed by: STUDENT IN AN ORGANIZED HEALTH CARE EDUCATION/TRAINING PROGRAM

## 2025-04-04 RX ORDER — DEXTROSE MONOHYDRATE 50 MG/ML
INJECTION, SOLUTION INTRAVENOUS CONTINUOUS
Status: DISCONTINUED | OUTPATIENT
Start: 2025-04-04 | End: 2025-04-05

## 2025-04-04 RX ADMIN — POLYETHYLENE GLYCOL 3350 17 G: 17 POWDER, FOR SOLUTION ORAL at 09:14

## 2025-04-04 RX ADMIN — Medication: at 09:14

## 2025-04-04 RX ADMIN — HEPARIN SODIUM 5000 UNITS: 5000 INJECTION INTRAVENOUS; SUBCUTANEOUS at 07:08

## 2025-04-04 RX ADMIN — PROPOFOL 20 MCG/KG/MIN: 10 INJECTION, EMULSION INTRAVENOUS at 20:26

## 2025-04-04 RX ADMIN — CEFTRIAXONE SODIUM 2000 MG: 2 INJECTION, POWDER, FOR SOLUTION INTRAMUSCULAR; INTRAVENOUS at 07:07

## 2025-04-04 RX ADMIN — HYDROCORTISONE SODIUM SUCCINATE 50 MG: 100 INJECTION, POWDER, FOR SOLUTION INTRAMUSCULAR; INTRAVENOUS at 21:43

## 2025-04-04 RX ADMIN — PROPOFOL 20 MCG/KG/MIN: 10 INJECTION, EMULSION INTRAVENOUS at 07:12

## 2025-04-04 RX ADMIN — HEPARIN SODIUM 5000 UNITS: 5000 INJECTION INTRAVENOUS; SUBCUTANEOUS at 14:20

## 2025-04-04 RX ADMIN — THIAMINE HYDROCHLORIDE 200 MG: 100 INJECTION, SOLUTION INTRAMUSCULAR; INTRAVENOUS at 18:20

## 2025-04-04 RX ADMIN — HYDROCORTISONE SODIUM SUCCINATE 50 MG: 100 INJECTION, POWDER, FOR SOLUTION INTRAMUSCULAR; INTRAVENOUS at 14:20

## 2025-04-04 RX ADMIN — HYDROCORTISONE SODIUM SUCCINATE 50 MG: 100 INJECTION, POWDER, FOR SOLUTION INTRAMUSCULAR; INTRAVENOUS at 03:24

## 2025-04-04 RX ADMIN — DEXTROSE 100 ML/HR: 5 SOLUTION INTRAVENOUS at 12:39

## 2025-04-04 RX ADMIN — THIAMINE HYDROCHLORIDE 200 MG: 100 INJECTION, SOLUTION INTRAMUSCULAR; INTRAVENOUS at 03:26

## 2025-04-04 RX ADMIN — HYDROCORTISONE SODIUM SUCCINATE 50 MG: 100 INJECTION, POWDER, FOR SOLUTION INTRAMUSCULAR; INTRAVENOUS at 09:13

## 2025-04-04 RX ADMIN — DEXTROSE 100 ML/HR: 5 SOLUTION INTRAVENOUS at 00:30

## 2025-04-04 RX ADMIN — SODIUM CHLORIDE, PRESERVATIVE FREE 20 MG: 5 INJECTION INTRAVENOUS at 09:13

## 2025-04-04 RX ADMIN — VANCOMYCIN HYDROCHLORIDE 1000 MG: 1 INJECTION, POWDER, LYOPHILIZED, FOR SOLUTION INTRAVENOUS at 14:23

## 2025-04-04 RX ADMIN — THIAMINE HYDROCHLORIDE 200 MG: 100 INJECTION, SOLUTION INTRAMUSCULAR; INTRAVENOUS at 12:38

## 2025-04-04 RX ADMIN — DEXTROSE: 5 SOLUTION INTRAVENOUS at 22:14

## 2025-04-04 RX ADMIN — PROPOFOL 25 MCG/KG/MIN: 10 INJECTION, EMULSION INTRAVENOUS at 02:06

## 2025-04-04 RX ADMIN — DEXTROSE: 5 SOLUTION INTRAVENOUS at 17:44

## 2025-04-04 RX ADMIN — Medication: at 21:46

## 2025-04-04 RX ADMIN — HEPARIN SODIUM 5000 UNITS: 5000 INJECTION INTRAVENOUS; SUBCUTANEOUS at 21:43

## 2025-04-04 RX ADMIN — SODIUM CHLORIDE 1000 ML: 4.5 INJECTION, SOLUTION INTRAVENOUS at 17:47

## 2025-04-04 RX ADMIN — SODIUM CHLORIDE 1000 ML: 4.5 INJECTION, SOLUTION INTRAVENOUS at 18:21

## 2025-04-04 ASSESSMENT — PULMONARY FUNCTION TESTS
PIF_VALUE: 16
PIF_VALUE: 11
PIF_VALUE: 16
PIF_VALUE: 11
PIF_VALUE: 11
PIF_VALUE: 16
PIF_VALUE: 19
PIF_VALUE: 19
PIF_VALUE: 11
PIF_VALUE: 11
PIF_VALUE: 10
PIF_VALUE: 12
PIF_VALUE: 16
PIF_VALUE: 11
PIF_VALUE: 16
PIF_VALUE: 11
PIF_VALUE: 16
PIF_VALUE: 10
PIF_VALUE: 19
PIF_VALUE: 11
PIF_VALUE: 16
PIF_VALUE: 11
PIF_VALUE: 18
PIF_VALUE: 15
PIF_VALUE: 8.6
PIF_VALUE: 16
PIF_VALUE: 11
PIF_VALUE: 11
PIF_VALUE: 16
PIF_VALUE: 11
PIF_VALUE: 16
PIF_VALUE: 17
PIF_VALUE: 15
PIF_VALUE: 16
PIF_VALUE: 11
PIF_VALUE: 16
PIF_VALUE: 16
PIF_VALUE: 10
PIF_VALUE: 11
PIF_VALUE: 16

## 2025-04-04 NOTE — PROGRESS NOTES
Vancomycin Dosing Consult  Dy Krista Hamm is a 89 y.o. male with bloodstream infection. Pharmacy was consulted by Dr. Mancilla to dose and monitor vancomycin. Today is day 2.    Antibiotic regimen: Vancomycin + Ceftriaxone    Temp (24hrs), Av.7 °F (36.5 °C), Min:96.1 °F (35.6 °C), Max:98.8 °F (37.1 °C)    Recent Labs     25  1725 25  0800 25  1631 25  2222 25  0615   WBC 19.5* 18.1*  --   --  20.0*   CREATININE 4.49* 3.98* 3.83* 3.70* 3.59*   * 142* 147* 157* 148*     Est CrCl: ~15 mL/min (calculated with 79 yo age since entered as a [facility] currently)  Concomitant nephrotoxic drugs: Vasopressors    Cultures:    Blood: PCR pending, GPC in clusters growing in 2/4 bottles, prelim  4/3 Urine: pending  4/3 Respiratory: Pending    MRSA Swab: Pending    Target range: Re-dose for random level <15 mcg/mL    Recent level history:  Date/Time Dose & Interval Measured Level (mcg/mL) Associated AUC/LONNIE    0837 1750 mg 15.0         Assessment/Plan:   GPC growing in 2/4 bottles, leukocytosis  WBC currently 20.0 and Scr is 3.59.  Received Vancomycin 1750 mg x 1 dose on  @   Level resulted at 15.0.  Continue pulse dosing patient.  Give 1 gram dose this evening.  Next level scheduled for  at 1200  Antimicrobial stop date 14 days

## 2025-04-04 NOTE — CARE COORDINATION
CM reviewed Pt medicals, Pt remain on the vent.      Per [employer] search a possible grandson was found.  CM called Bob Rossi at [phone], left a VM requesting a return call.       Per IDR    Pt remains on the vent, Pt is improving.

## 2025-04-04 NOTE — PROGRESS NOTES
[facility] ICU Progress Note        Jerel Velasco Xxhavana  1/1/1880  [id number]  4/4/2025      Brief HPI / Hospital Course:  Patient's real name is Mr. Phani Rossi, 85-year-old male.  Patient had been found unresponsive in his apartment (unsure why emergency providers were called to his apartment) and brought in to [facility] ED.  Patient had been intubated for respiratory failure and encephalopathy.  Unsure of downtime.  Admitted to ICU thereafter for further evaluation management.  Due to circulatory shock pressors and stress with steroids initiated.    4/3: Clarified patient identity.  Otherwise remains encephalopathic and critically ill.  Of note, blood cultures positive for gram-positive cocci on preliminary results.  4/4: MRI pending. Remains encephalopathic during SAT, but tolerating SBT    Assessment and plan:  Neuro  Acute metabolic encephalopathy  - Encephalopathy workup pending including UDS, EtOH, ammonia  - Unsure of what led to patient being found down.  - Head CT unremarkable   - f/u MRI  - Mild sedation for ventilator compliance  - Daily SAT SBT    CV  Septic shock  - Pressors ongoing along with stress dose steroids.  Will wean as tolerated  - TTE pending    Pulmonary  Acute hypoxic respiratory failure  - Continue lung protective mechanical ventilation.  Daily SAT SBT and wean as tolerated    Renal /  MEGHANA  Hypernatremia, improving  Lactic acidosis  UTI  Azotemia vs uremia  - Dextrose infusion along with tube feeds with increased free water flushes to address hypernatremia  - MEGHANA slowly improving  - Monitoring CK  - If renal function unimproved may require HD for uremia   - consulted nephrology    GI  Mild hyperammonemia  - consider lactulose if worsening    Endocrine  - On stress dose steroids  - SSI with Accu-Cheks    Heme-onc  No acute issues    ID  Gram-positive cocci bacteremia  UTI  - ceftriaxone  - adjust abx to culture data, final results pending      ICU DAILY CHECKLIST     Code  Status: Full No Order  DVT Prophylaxis: UFH 5000 U q8h  T/L/D: PIV  GI ppx: PPI  Diet:  ADULT TUBE FEEDING; Nasogastric; Other Tube Feeding (specify); Promote; Continuous; 15; Yes; 10; Q 12 hours; 25; 200; Q 3 hours; Protein; 2 Doses; BID  ADULT ORAL NUTRITION SUPPLEMENT; Breakfast, Dinner; Wound Healing Oral Supplement  Activity Level: bedrest Misc nursing order (specify)  ABCDEF Bundle/Checklist Completed: Yes  Disposition: ICU  Multidisciplinary Rounds Completed: Yes  Goals of Care Discussion/Palliative:  Yes  Patient/Family Updated: Yes      OBJECTIVE  Vitals:    04/04/25 1035 04/04/25 1100 04/04/25 1125 04/04/25 1200   BP:  (!) 128/53  (!) 133/50   Pulse: 59 53 55 57   Resp: 15 15 15 14   Temp:  97 °F (36.1 °C)     TempSrc:  Rectal     SpO2: 100% 100% 100% 100%   Weight:       Height:           EXAM:   Physical Exam  Vitals reviewed.   Constitutional:       Appearance: He is not ill-appearing or diaphoretic.   HENT:      Head: Normocephalic and atraumatic.      Nose: Nose normal.      Mouth/Throat:      Mouth: Mucous membranes are moist.   Eyes:      Extraocular Movements: Extraocular movements intact.      Pupils: Pupils are equal, round, and reactive to light.   Cardiovascular:      Rate and Rhythm: Normal rate and regular rhythm.      Pulses: Normal pulses.      Heart sounds: Normal heart sounds. No murmur heard.  Pulmonary:      Effort: Pulmonary effort is normal. No respiratory distress.      Breath sounds: Normal breath sounds. No wheezing, rhonchi or rales.   Abdominal:      General: Bowel sounds are normal. There is no distension.      Palpations: Abdomen is soft.      Tenderness: There is no abdominal tenderness. There is no guarding or rebound.   Musculoskeletal:         General: No swelling or deformity. Normal range of motion.      Cervical back: Normal range of motion. No rigidity.   Skin:     General: Skin is warm and dry.      Capillary Refill: Capillary refill takes less than 2 seconds.

## 2025-04-04 NOTE — WOUND CARE
IP WOUND CONSULT    Jerel Hamm  MEDICAL RECORD NUMBER:  [id number]  AGE: 89 y.o.   GENDER: male  : 1880  TODAY'S DATE:  2025    GENERAL     [] Follow-up   [x] New Consult    Jerel Hamm is a 89 y.o. male referred by:   [x] Physician  [] Nursing  [] Other:         PAST MEDICAL HISTORY    No past medical history on file.     PAST SURGICAL HISTORY    No past surgical history on file.    FAMILY HISTORY    No family history on file.      ALLERGIES    Not on File    MEDICATIONS    No current facility-administered medications on file prior to encounter.     No current outpatient medications on file prior to encounter.          BP (!) 146/66   Pulse 59   Temp 97.2 °F (36.2 °C) (Rectal)   Resp 15   Ht 1.727 m (5' 8\")   Wt 73.1 kg (161 lb 1.6 oz)   SpO2 100%   BMI 24.50 kg/m²     Lab Results   Component Value Date/Time    WBC 20.0 (H) 2025 06:15 AM    POCGLU 161 (H) 2025 08:16 AM    POCGLU 153 (H) 2025 07:31 AM    HGB 12.3 2025 06:15 AM    HCT 38.6 2025 06:15 AM    PLT 72 (L) 2025 06:15 AM     ADULT TUBE FEEDING; Nasogastric; Other Tube Feeding (specify); Promote; Continuous; 15; Yes; 10; Q 12 hours; 25; 200; Q 3 hours; Protein; 2 Doses; BID  ADULT ORAL NUTRITION SUPPLEMENT; Breakfast, Dinner; Wound Healing Oral Supplement     Rodolfo BID    ASSESSMENT     Wound Identification & Type: several POA, see below  Dressing change: see flow chart    Contributing Factors: decreased mobility, shear force, incontinence of stool, and incontinence of urine    Wound 25 Face Right (Active)   Wound Image   25 1540   Wound Etiology Pressure Unstageable 25 0400   Dressing/Treatment Open to air 25 0400   Wound Assessment Eschar dry 25 1540   Drainage Amount None (dry) 25 1540   Odor None 25 1540   Laverne-wound Assessment Denuded 25 1540   Margins Attached edges 25 1540   Number of days: 1       Wound 25 Jaw Right (Active)  04/03/25 1600   Drainage Amount None (dry) 04/03/25 1600   Odor None 04/03/25 1600   Laverne-wound Assessment Blanchable erythema 04/03/25 1600   Margins Attached edges 04/03/25 1600   Number of days: 1       Wound 04/04/25 Back Medial;Upper (Active)   Wound Image   04/04/25 1045   Wound Etiology Pressure Unstageable 04/04/25 1045   Dressing Status New dressing applied 04/04/25 1045   Wound Cleansed Cleansed with saline 04/04/25 1045   Dressing/Treatment Honey gel/honey paste;Foam 04/04/25 1045   Dressing Change Due 04/06/25 04/04/25 1045   Wound Length (cm) 6 cm 04/04/25 1045   Wound Width (cm) 3 cm 04/04/25 1045   Wound Depth (cm) 0.1 cm 04/04/25 1045   Wound Surface Area (cm^2) 18 cm^2 04/04/25 1045   Wound Volume (cm^3) 1.8 cm^3 04/04/25 1045   Wound Assessment Erythema;Pink/red;Slough 04/04/25 1045   Drainage Amount Scant (moist but unmeasurable) 04/04/25 1045   Drainage Description Serous 04/04/25 1045   Odor None 04/04/25 1045   Laverne-wound Assessment Dry/flaky 04/04/25 1045   Margins Undefined edges 04/04/25 1045   Number of days: 0       Wound 04/04/25 Sacrum (Active)   Wound Image   04/04/25 1047   Wound Etiology Pressure Stage 2 04/04/25 1047   Dressing Status New dressing applied 04/04/25 1047   Dressing/Treatment Zinc paste;Foam 04/04/25 1047   Dressing Change Due 04/05/25 04/04/25 1047   Wound Length (cm) 1 cm 04/04/25 1047   Wound Width (cm) 1 cm 04/04/25 1047   Wound Depth (cm) 0.1 cm 04/04/25 1047   Wound Surface Area (cm^2) 1 cm^2 04/04/25 1047   Wound Volume (cm^3) 0.1 cm^3 04/04/25 1047   Wound Assessment Pink/red 04/04/25 1047   Drainage Amount None (dry) 04/04/25 1047   Odor None 04/04/25 1047   Laverne-wound Assessment Blanchable erythema 04/04/25 1047   Margins Attached edges 04/04/25 1047   Number of days: 0       Wound 04/04/25 Coccyx (Active)   Wound Image   04/04/25 1048   Wound Etiology Other 04/04/25 1048   Dressing Status New dressing applied 04/04/25 1048   Dressing/Treatment Zinc paste;Foam

## 2025-04-04 NOTE — CONSULTS
Nephrology Consultation          Patient: Jerel Hamm MRN: [id number]  SSN: [id number]    YOB: 1880  Age: 89 y.o.  Sex: male      Subjective:   Reason for the consultation.  Acute kidney injury/hypernatremia.  History of present illness.  The patient is 85-year-old man was brought unresponsive/found on at home.  On arrival hypotensive/hypothermia and leukocytosis.  Initial chemistry BUN/creatinine 152/4.49 and hypernatremia.  On ventilatory support with minimal vent settings.  Chest x-ray no edema.  2D echocardiogram LVEF 40 to 45%.  Has Aguirre catheter in place and urine output is appropriate.  No noticed lower extremity swelling.  He is currently on 1 pressor support.    No past medical history on file.  No past surgical history on file.   No family history on file.  Social History     Tobacco Use    Smoking status: Former     Types: Cigarettes     Start date: 1970     Passive exposure: Current    Smokeless tobacco: Current   Substance Use Topics    Alcohol use: Defer      Current Facility-Administered Medications   Medication Dose Route Frequency Provider Last Rate Last Admin    [START ON 4/5/2025] vancomycin - please draw level on 4/5 at 1200  1 each Other Once Carol Mancilla R, DO        hydrocortisone sodium succinate PF (SOLU-CORTEF) injection 50 mg  50 mg IntraVENous Q6H Carol Mancilla R, DO   50 mg at 04/04/25 1420    dextrose 5 % solution  100 mL/hr IntraVENous Continuous Carol Mancilla R,  mL/hr at 04/04/25 1239 100 mL/hr at 04/04/25 1239    polyethylene glycol (GLYCOLAX) packet 17 g  17 g Oral Daily Carol Mancilla R, DO   17 g at 04/04/25 0914    cefTRIAXone (ROCEPHIN) 2,000 mg in sterile water 20 mL IV syringe  2,000 mg IntraVENous Q24H Carol Mancilla R, DO   2,000 mg at 04/04/25 0707    heparin (porcine) injection 5,000 Units  5,000 Units SubCUTAneous 3 times per day Carol Mancilla R, DO   5,000 Units at 04/04/25 1420    glucose chewable tablet 16 g  4 tablet Oral PRN Laurent

## 2025-04-05 ENCOUNTER — APPOINTMENT (OUTPATIENT)
Facility: HOSPITAL | Age: 85
End: 2025-04-05
Payer: OTHER GOVERNMENT

## 2025-04-05 LAB
ALBUMIN SERPL-MCNC: 1.4 G/DL (ref 3.5–5)
ALBUMIN SERPL-MCNC: 1.4 G/DL (ref 3.5–5)
ALBUMIN/GLOB SERPL: 0.5 (ref 1.1–2.2)
ALBUMIN/GLOB SERPL: 0.5 (ref 1.1–2.2)
ALP SERPL-CCNC: 61 U/L (ref 45–117)
ALP SERPL-CCNC: 61 U/L (ref 45–117)
ALT SERPL-CCNC: 39 U/L (ref 12–78)
ALT SERPL-CCNC: 39 U/L (ref 12–78)
ANION GAP SERPL CALC-SCNC: 7 MMOL/L (ref 2–12)
ANION GAP SERPL CALC-SCNC: 7 MMOL/L (ref 2–12)
ARTERIAL PATENCY WRIST A: YES
ARTERIAL PATENCY WRIST A: YES
AST SERPL W P-5'-P-CCNC: 48 U/L (ref 15–37)
AST SERPL W P-5'-P-CCNC: 48 U/L (ref 15–37)
BASE DEFICIT BLDA-SCNC: 4.6 MMOL/L
BASE DEFICIT BLDA-SCNC: 4.6 MMOL/L
BASOPHILS # BLD: 0 K/UL (ref 0–0.1)
BASOPHILS # BLD: 0 K/UL (ref 0–0.1)
BASOPHILS NFR BLD: 0 % (ref 0–1)
BASOPHILS NFR BLD: 0 % (ref 0–1)
BDY SITE: ABNORMAL
BDY SITE: ABNORMAL
BILIRUB SERPL-MCNC: 0.6 MG/DL (ref 0.2–1)
BILIRUB SERPL-MCNC: 0.6 MG/DL (ref 0.2–1)
BODY TEMPERATURE: 98.8
BODY TEMPERATURE: 98.8
BUN SERPL-MCNC: 116 MG/DL (ref 6–20)
BUN SERPL-MCNC: 116 MG/DL (ref 6–20)
BUN/CREAT SERPL: 44 (ref 12–20)
BUN/CREAT SERPL: 44 (ref 12–20)
CA-I BLD-MCNC: 7 MG/DL (ref 8.5–10.1)
CA-I BLD-MCNC: 7 MG/DL (ref 8.5–10.1)
CHLORIDE SERPL-SCNC: 117 MMOL/L (ref 97–108)
CHLORIDE SERPL-SCNC: 117 MMOL/L (ref 97–108)
CO2 SERPL-SCNC: 20 MMOL/L (ref 21–32)
CO2 SERPL-SCNC: 20 MMOL/L (ref 21–32)
COHGB MFR BLD: 0.3 % (ref 1–2)
COHGB MFR BLD: 0.3 % (ref 1–2)
CREAT SERPL-MCNC: 2.61 MG/DL (ref 0.7–1.3)
CREAT SERPL-MCNC: 2.61 MG/DL (ref 0.7–1.3)
DATE LAST DOSE: NORMAL
DATE LAST DOSE: NORMAL
DIFFERENTIAL METHOD BLD: ABNORMAL
DIFFERENTIAL METHOD BLD: ABNORMAL
DOSE AMOUNT: NORMAL UNITS
DOSE AMOUNT: NORMAL UNITS
EOSINOPHIL # BLD: 0 K/UL (ref 0–0.4)
EOSINOPHIL # BLD: 0 K/UL (ref 0–0.4)
EOSINOPHIL NFR BLD: 0 % (ref 0–7)
EOSINOPHIL NFR BLD: 0 % (ref 0–7)
ERYTHROCYTE [DISTWIDTH] IN BLOOD BY AUTOMATED COUNT: 14.5 % (ref 11.5–14.5)
ERYTHROCYTE [DISTWIDTH] IN BLOOD BY AUTOMATED COUNT: 14.5 % (ref 11.5–14.5)
FIO2 ON VENT: 45 %
FIO2 ON VENT: 45 %
GAS FLOW.O2 SETTING OXYMISER: 12
GAS FLOW.O2 SETTING OXYMISER: 12
GLOBULIN SER CALC-MCNC: 2.7 G/DL (ref 2–4)
GLOBULIN SER CALC-MCNC: 2.7 G/DL (ref 2–4)
GLUCOSE BLD STRIP.AUTO-MCNC: 123 MG/DL (ref 65–100)
GLUCOSE BLD STRIP.AUTO-MCNC: 123 MG/DL (ref 65–100)
GLUCOSE BLD STRIP.AUTO-MCNC: 133 MG/DL (ref 65–100)
GLUCOSE BLD STRIP.AUTO-MCNC: 133 MG/DL (ref 65–100)
GLUCOSE BLD STRIP.AUTO-MCNC: 171 MG/DL (ref 65–100)
GLUCOSE BLD STRIP.AUTO-MCNC: 171 MG/DL (ref 65–100)
GLUCOSE BLD STRIP.AUTO-MCNC: 183 MG/DL (ref 65–100)
GLUCOSE BLD STRIP.AUTO-MCNC: 183 MG/DL (ref 65–100)
GLUCOSE SERPL-MCNC: 176 MG/DL (ref 65–100)
GLUCOSE SERPL-MCNC: 176 MG/DL (ref 65–100)
HCO3 BLDA-SCNC: 18 MMOL/L (ref 22–26)
HCO3 BLDA-SCNC: 18 MMOL/L (ref 22–26)
HCT VFR BLD AUTO: 30.5 % (ref 36.6–50.3)
HCT VFR BLD AUTO: 30.5 % (ref 36.6–50.3)
HGB BLD-MCNC: 9.9 G/DL (ref 12.1–17)
HGB BLD-MCNC: 9.9 G/DL (ref 12.1–17)
IMM GRANULOCYTES # BLD AUTO: 0 K/UL
IMM GRANULOCYTES # BLD AUTO: 0 K/UL
IMM GRANULOCYTES NFR BLD AUTO: 0 %
IMM GRANULOCYTES NFR BLD AUTO: 0 %
LACTATE SERPL-SCNC: 2.4 MMOL/L (ref 0.4–2)
LACTATE SERPL-SCNC: 2.4 MMOL/L (ref 0.4–2)
LYMPHOCYTES # BLD: 0.97 K/UL (ref 0.8–3.5)
LYMPHOCYTES # BLD: 0.97 K/UL (ref 0.8–3.5)
LYMPHOCYTES NFR BLD: 6 % (ref 12–49)
LYMPHOCYTES NFR BLD: 6 % (ref 12–49)
MAGNESIUM SERPL-MCNC: 2.5 MG/DL (ref 1.6–2.4)
MAGNESIUM SERPL-MCNC: 2.5 MG/DL (ref 1.6–2.4)
MCH RBC QN AUTO: 30.7 PG (ref 26–34)
MCH RBC QN AUTO: 30.7 PG (ref 26–34)
MCHC RBC AUTO-ENTMCNC: 32.5 G/DL (ref 30–36.5)
MCHC RBC AUTO-ENTMCNC: 32.5 G/DL (ref 30–36.5)
MCV RBC AUTO: 94.7 FL (ref 80–99)
MCV RBC AUTO: 94.7 FL (ref 80–99)
METHGB MFR BLD: 0.5 % (ref 0–1.4)
METHGB MFR BLD: 0.5 % (ref 0–1.4)
MONOCYTES # BLD: 0.32 K/UL (ref 0–1)
MONOCYTES # BLD: 0.32 K/UL (ref 0–1)
MONOCYTES NFR BLD: 2 % (ref 5–13)
MONOCYTES NFR BLD: 2 % (ref 5–13)
MYELOCYTES NFR BLD MANUAL: 3 %
MYELOCYTES NFR BLD MANUAL: 3 %
NEUTS BAND NFR BLD MANUAL: 4 % (ref 0–6)
NEUTS BAND NFR BLD MANUAL: 4 % (ref 0–6)
NEUTS SEG # BLD: 14.33 K/UL (ref 1.8–8)
NEUTS SEG # BLD: 14.33 K/UL (ref 1.8–8)
NEUTS SEG NFR BLD: 85 % (ref 32–75)
NEUTS SEG NFR BLD: 85 % (ref 32–75)
NRBC # BLD: 0.1 K/UL (ref 0–0.01)
NRBC # BLD: 0.1 K/UL (ref 0–0.01)
NRBC BLD-RTO: 0.6 PER 100 WBC
NRBC BLD-RTO: 0.6 PER 100 WBC
OXYHGB MFR BLD: 86.6 % (ref 95–99)
OXYHGB MFR BLD: 86.6 % (ref 95–99)
PCO2 BLDA: 27 MMHG (ref 35–45)
PCO2 BLDA: 27 MMHG (ref 35–45)
PEEP RESPIRATORY: 5
PEEP RESPIRATORY: 5
PERFORMED BY:: ABNORMAL
PH BLDA: 7.44 (ref 7.35–7.45)
PH BLDA: 7.44 (ref 7.35–7.45)
PHOSPHATE SERPL-MCNC: 3.3 MG/DL (ref 2.6–4.7)
PHOSPHATE SERPL-MCNC: 3.3 MG/DL (ref 2.6–4.7)
PLATELET # BLD AUTO: 52 K/UL (ref 150–400)
PLATELET # BLD AUTO: 52 K/UL (ref 150–400)
PMV BLD AUTO: 12.9 FL (ref 8.9–12.9)
PMV BLD AUTO: 12.9 FL (ref 8.9–12.9)
PO2 BLDA: 42 MMHG (ref 80–100)
PO2 BLDA: 42 MMHG (ref 80–100)
POTASSIUM SERPL-SCNC: 4 MMOL/L (ref 3.5–5.1)
POTASSIUM SERPL-SCNC: 4 MMOL/L (ref 3.5–5.1)
PROT SERPL-MCNC: 4.1 G/DL (ref 6.4–8.2)
PROT SERPL-MCNC: 4.1 G/DL (ref 6.4–8.2)
RBC # BLD AUTO: 3.22 M/UL (ref 4.1–5.7)
RBC # BLD AUTO: 3.22 M/UL (ref 4.1–5.7)
RBC MORPH BLD: ABNORMAL
RBC MORPH BLD: ABNORMAL
SAO2 % BLD: 87 % (ref 95–99)
SAO2 % BLD: 87 % (ref 95–99)
SAO2% DEVICE SAO2% SENSOR NAME: ABNORMAL
SAO2% DEVICE SAO2% SENSOR NAME: ABNORMAL
SERVICE CMNT-IMP: ABNORMAL
SERVICE CMNT-IMP: ABNORMAL
SODIUM SERPL-SCNC: 144 MMOL/L (ref 136–145)
SODIUM SERPL-SCNC: 144 MMOL/L (ref 136–145)
SPECIMEN SITE: ABNORMAL
SPECIMEN SITE: ABNORMAL
VANCOMYCIN SERPL-MCNC: 14.8 UG/ML
VANCOMYCIN SERPL-MCNC: 14.8 UG/ML
VENTILATION MODE VENT: ABNORMAL
VENTILATION MODE VENT: ABNORMAL
VT SETTING VENT: 410
VT SETTING VENT: 410
WBC # BLD AUTO: 16.1 K/UL (ref 4.1–11.1)
WBC # BLD AUTO: 16.1 K/UL (ref 4.1–11.1)
WBC MORPH BLD: ABNORMAL
WBC MORPH BLD: ABNORMAL

## 2025-04-05 PROCEDURE — 80202 ASSAY OF VANCOMYCIN: CPT

## 2025-04-05 PROCEDURE — 6370000000 HC RX 637 (ALT 250 FOR IP): Performed by: STUDENT IN AN ORGANIZED HEALTH CARE EDUCATION/TRAINING PROGRAM

## 2025-04-05 PROCEDURE — 2500000003 HC RX 250 WO HCPCS: Performed by: STUDENT IN AN ORGANIZED HEALTH CARE EDUCATION/TRAINING PROGRAM

## 2025-04-05 PROCEDURE — 94761 N-INVAS EAR/PLS OXIMETRY MLT: CPT

## 2025-04-05 PROCEDURE — 2580000003 HC RX 258: Performed by: INTERNAL MEDICINE

## 2025-04-05 PROCEDURE — 71045 X-RAY EXAM CHEST 1 VIEW: CPT

## 2025-04-05 PROCEDURE — 94003 VENT MGMT INPAT SUBQ DAY: CPT

## 2025-04-05 PROCEDURE — 2500000003 HC RX 250 WO HCPCS: Performed by: INTERNAL MEDICINE

## 2025-04-05 PROCEDURE — 85025 COMPLETE CBC W/AUTO DIFF WBC: CPT

## 2025-04-05 PROCEDURE — 2580000003 HC RX 258: Performed by: STUDENT IN AN ORGANIZED HEALTH CARE EDUCATION/TRAINING PROGRAM

## 2025-04-05 PROCEDURE — 83605 ASSAY OF LACTIC ACID: CPT

## 2025-04-05 PROCEDURE — 6360000002 HC RX W HCPCS: Performed by: STUDENT IN AN ORGANIZED HEALTH CARE EDUCATION/TRAINING PROGRAM

## 2025-04-05 PROCEDURE — 82803 BLOOD GASES ANY COMBINATION: CPT

## 2025-04-05 PROCEDURE — 6360000002 HC RX W HCPCS: Performed by: NURSE PRACTITIONER

## 2025-04-05 PROCEDURE — 83735 ASSAY OF MAGNESIUM: CPT

## 2025-04-05 PROCEDURE — 80053 COMPREHEN METABOLIC PANEL: CPT

## 2025-04-05 PROCEDURE — 84100 ASSAY OF PHOSPHORUS: CPT

## 2025-04-05 PROCEDURE — 2000000000 HC ICU R&B

## 2025-04-05 PROCEDURE — 82962 GLUCOSE BLOOD TEST: CPT

## 2025-04-05 PROCEDURE — 36415 COLL VENOUS BLD VENIPUNCTURE: CPT

## 2025-04-05 PROCEDURE — 70551 MRI BRAIN STEM W/O DYE: CPT

## 2025-04-05 PROCEDURE — 6360000002 HC RX W HCPCS: Performed by: INTERNAL MEDICINE

## 2025-04-05 PROCEDURE — 2700000000 HC OXYGEN THERAPY PER DAY

## 2025-04-05 PROCEDURE — 36600 WITHDRAWAL OF ARTERIAL BLOOD: CPT

## 2025-04-05 PROCEDURE — P9045 ALBUMIN (HUMAN), 5%, 250 ML: HCPCS | Performed by: STUDENT IN AN ORGANIZED HEALTH CARE EDUCATION/TRAINING PROGRAM

## 2025-04-05 RX ORDER — CALCIUM GLUCONATE 20 MG/ML
2000 INJECTION, SOLUTION INTRAVENOUS ONCE
Status: COMPLETED | OUTPATIENT
Start: 2025-04-05 | End: 2025-04-05

## 2025-04-05 RX ORDER — GAUZE BANDAGE 2" X 2"
100 BANDAGE TOPICAL DAILY
Status: DISCONTINUED | OUTPATIENT
Start: 2025-04-05 | End: 2025-04-09

## 2025-04-05 RX ORDER — ALBUMIN HUMAN 50 G/1000ML
25 SOLUTION INTRAVENOUS ONCE
Status: COMPLETED | OUTPATIENT
Start: 2025-04-05 | End: 2025-04-05

## 2025-04-05 RX ORDER — ASPIRIN 81 MG/1
81 TABLET, CHEWABLE ORAL DAILY
Status: DISCONTINUED | OUTPATIENT
Start: 2025-04-05 | End: 2025-04-09

## 2025-04-05 RX ORDER — ATORVASTATIN CALCIUM 40 MG/1
40 TABLET, FILM COATED ORAL NIGHTLY
Status: DISCONTINUED | OUTPATIENT
Start: 2025-04-05 | End: 2025-04-09

## 2025-04-05 RX ORDER — SODIUM CHLORIDE 450 MG/100ML
INJECTION, SOLUTION INTRAVENOUS CONTINUOUS
Status: DISCONTINUED | OUTPATIENT
Start: 2025-04-05 | End: 2025-04-07

## 2025-04-05 RX ADMIN — SODIUM CHLORIDE, PRESERVATIVE FREE 20 MG: 5 INJECTION INTRAVENOUS at 09:06

## 2025-04-05 RX ADMIN — HYDROCORTISONE SODIUM SUCCINATE 50 MG: 100 INJECTION, POWDER, FOR SOLUTION INTRAMUSCULAR; INTRAVENOUS at 14:29

## 2025-04-05 RX ADMIN — HEPARIN SODIUM 5000 UNITS: 5000 INJECTION INTRAVENOUS; SUBCUTANEOUS at 22:23

## 2025-04-05 RX ADMIN — VANCOMYCIN HYDROCHLORIDE 1000 MG: 1 INJECTION, POWDER, LYOPHILIZED, FOR SOLUTION INTRAVENOUS at 18:01

## 2025-04-05 RX ADMIN — CALCIUM GLUCONATE 2000 MG: 20 INJECTION, SOLUTION INTRAVENOUS at 10:51

## 2025-04-05 RX ADMIN — SODIUM CHLORIDE: 4.5 INJECTION, SOLUTION INTRAVENOUS at 23:12

## 2025-04-05 RX ADMIN — HYDROCORTISONE SODIUM SUCCINATE 50 MG: 100 INJECTION, POWDER, FOR SOLUTION INTRAMUSCULAR; INTRAVENOUS at 07:45

## 2025-04-05 RX ADMIN — PROPOFOL 15 MCG/KG/MIN: 10 INJECTION, EMULSION INTRAVENOUS at 06:53

## 2025-04-05 RX ADMIN — POLYETHYLENE GLYCOL 3350 17 G: 17 POWDER, FOR SOLUTION ORAL at 09:06

## 2025-04-05 RX ADMIN — DEXTROSE: 5 SOLUTION INTRAVENOUS at 06:02

## 2025-04-05 RX ADMIN — THIAMINE HYDROCHLORIDE 200 MG: 100 INJECTION, SOLUTION INTRAMUSCULAR; INTRAVENOUS at 02:11

## 2025-04-05 RX ADMIN — PROPOFOL 15 MCG/KG/MIN: 10 INJECTION, EMULSION INTRAVENOUS at 20:06

## 2025-04-05 RX ADMIN — ALBUMIN (HUMAN) 25 G: 12.5 INJECTION, SOLUTION INTRAVENOUS at 08:54

## 2025-04-05 RX ADMIN — Medication: at 09:07

## 2025-04-05 RX ADMIN — THIAMINE HCL TAB 100 MG 100 MG: 100 TAB at 18:01

## 2025-04-05 RX ADMIN — INSULIN LISPRO 2 UNITS: 100 INJECTION, SOLUTION INTRAVENOUS; SUBCUTANEOUS at 07:45

## 2025-04-05 RX ADMIN — THIAMINE HYDROCHLORIDE 100 MG: 100 INJECTION, SOLUTION INTRAMUSCULAR; INTRAVENOUS at 11:02

## 2025-04-05 RX ADMIN — Medication: at 20:09

## 2025-04-05 RX ADMIN — FENTANYL CITRATE 50 MCG: 50 INJECTION, SOLUTION INTRAMUSCULAR; INTRAVENOUS at 11:29

## 2025-04-05 RX ADMIN — Medication 5 MCG/MIN: at 04:01

## 2025-04-05 RX ADMIN — SODIUM CHLORIDE 1000 ML: 4.5 INJECTION, SOLUTION INTRAVENOUS at 10:16

## 2025-04-05 RX ADMIN — HEPARIN SODIUM 5000 UNITS: 5000 INJECTION INTRAVENOUS; SUBCUTANEOUS at 06:32

## 2025-04-05 RX ADMIN — HYDROCORTISONE SODIUM SUCCINATE 50 MG: 100 INJECTION, POWDER, FOR SOLUTION INTRAMUSCULAR; INTRAVENOUS at 02:10

## 2025-04-05 RX ADMIN — SODIUM CHLORIDE: 4.5 INJECTION, SOLUTION INTRAVENOUS at 14:29

## 2025-04-05 RX ADMIN — ATORVASTATIN CALCIUM 40 MG: 40 TABLET, FILM COATED ORAL at 20:09

## 2025-04-05 RX ADMIN — HEPARIN SODIUM 5000 UNITS: 5000 INJECTION INTRAVENOUS; SUBCUTANEOUS at 14:29

## 2025-04-05 RX ADMIN — FENTANYL CITRATE 50 MCG: 50 INJECTION, SOLUTION INTRAMUSCULAR; INTRAVENOUS at 00:23

## 2025-04-05 RX ADMIN — CEFTRIAXONE SODIUM 2000 MG: 2 INJECTION, POWDER, FOR SOLUTION INTRAMUSCULAR; INTRAVENOUS at 05:55

## 2025-04-05 ASSESSMENT — PULMONARY FUNCTION TESTS
PIF_VALUE: 19
PIF_VALUE: 16
PIF_VALUE: 8.7
PIF_VALUE: 7.5
PIF_VALUE: 16
PIF_VALUE: 16
PIF_VALUE: 14
PIF_VALUE: 15
PIF_VALUE: 7.8
PIF_VALUE: 7.2
PIF_VALUE: 19
PIF_VALUE: 16
PIF_VALUE: 7.1
PIF_VALUE: 15
PIF_VALUE: 12
PIF_VALUE: 13
PIF_VALUE: 16
PIF_VALUE: 19
PIF_VALUE: 7.5
PIF_VALUE: 22
PIF_VALUE: 15
PIF_VALUE: 16
PIF_VALUE: 17
PIF_VALUE: 16
PIF_VALUE: 19
PIF_VALUE: 17
PIF_VALUE: 16
PIF_VALUE: 7.3
PIF_VALUE: 24
PIF_VALUE: 14
PIF_VALUE: 16
PIF_VALUE: 16
PIF_VALUE: 7.5
PIF_VALUE: 15
PIF_VALUE: 18
PIF_VALUE: 14
PIF_VALUE: 20
PIF_VALUE: 22
PIF_VALUE: 7.4
PIF_VALUE: 18
PIF_VALUE: 19
PIF_VALUE: 18
PIF_VALUE: 7.4
PIF_VALUE: 19
PIF_VALUE: 7.6
PIF_VALUE: 18
PIF_VALUE: 7.3
PIF_VALUE: 15
PIF_VALUE: 15
PIF_VALUE: 22

## 2025-04-05 ASSESSMENT — PAIN SCALES - GENERAL: PAINLEVEL_OUTOF10: 0

## 2025-04-05 NOTE — PROGRESS NOTES
Vancomycin Dosing Consult  Dy Krista Hamm is a 89 y.o. male with bloodstream infection. Pharmacy was consulted by Dr. Mancilla to dose and monitor vancomycin. Today is day 3.    Antibiotic regimen: Vancomycin + Ceftriaxone    Temp (24hrs), Av.3 °F (36.8 °C), Min:97.3 °F (36.3 °C), Max:99.2 °F (37.3 °C)    Recent Labs     25  0800 25  1631 25  2222 25  0615 25  0300   WBC 18.1*  --   --  20.0* 16.1*   CREATININE 3.98*   < > 3.70* 3.59* 2.61*   *   < > 157* 148* 116*    < > = values in this interval not displayed.     Est CrCl: ~15 mL/min (calculated with 79 yo age since entered as a [facility] currently)  Concomitant nephrotoxic drugs: Vasopressors    Cultures:    Blood: GPC in clusters, Probable Staphylococcus species, coagulase negative growing in 4 of 4 bottles, prelim   Blood PCR: STAPHYLOCOCCUS and Staphylococcus lugdunensis by PCR   4/3 Urine: NG, final   4/3 Respiratory: Pending    MRSA Swab: Detected     Target range: Re-dose for random level <15 mcg/mL    Recent level history:  Date/Time Dose & Interval Measured Level (mcg/mL) Associated AUC/LONNIE    0837 1750 mg 15.0     1415 1000 mg x 1 14.8         Assessment/Plan:   Level resulted at 14.8, continue pulse dosing.   Will order Vancomycin IV 1 g x 1 dose today.   Next level scheduled for  @1300  CMP ordered through 4/10  Antimicrobial stop date 14 days

## 2025-04-05 NOTE — PLAN OF CARE
Problem: Safety - Adult  Goal: Free from fall injury  Outcome: Progressing     Problem: Discharge Planning  Goal: Discharge to home or other facility with appropriate resources  Outcome: Progressing     Problem: Neurosensory - Adult  Goal: Achieves stable or improved neurological status  Outcome: Progressing  Flowsheets (Taken 4/4/2025 2000)  Achieves stable or improved neurological status: Maintain blood pressure and fluid volume within ordered parameters to optimize cerebral perfusion and minimize risk of hemorrhage  Goal: Absence of seizures  Outcome: Progressing  Flowsheets (Taken 4/4/2025 2000)  Absence of seizures: Monitor for seizure activity.  If seizure occurs, document type and location of movements and any associated apnea  Goal: Remains free of injury related to seizures activity  Outcome: Progressing  Flowsheets (Taken 4/4/2025 2000)  Remains free of injury related to seizure activity: Maintain airway, patient safety  and administer oxygen as ordered  Goal: Achieves maximal functionality and self care  Outcome: Progressing  Flowsheets (Taken 4/4/2025 2000)  Achieves maximal functionality and self care: Monitor swallowing and airway patency with patient fatigue and changes in neurological status     Problem: Respiratory - Adult  Goal: Achieves optimal ventilation and oxygenation  Outcome: Progressing     Problem: Cardiovascular - Adult  Goal: Maintains optimal cardiac output and hemodynamic stability  Outcome: Progressing  Flowsheets (Taken 4/4/2025 2000)  Maintains optimal cardiac output and hemodynamic stability: Monitor blood pressure and heart rate  Goal: Absence of cardiac dysrhythmias or at baseline  Outcome: Progressing  Flowsheets (Taken 4/4/2025 2000)  Absence of cardiac dysrhythmias or at baseline: Monitor cardiac rate and rhythm     Problem: Skin/Tissue Integrity - Adult  Goal: Skin integrity remains intact  Outcome: Progressing  Flowsheets (Taken 4/4/2025 2000)  Skin Integrity Remains

## 2025-04-05 NOTE — PROGRESS NOTES
[facility] Progress Note        Jerel Velasco Xxhavana  1/1/1880  [id number]  4/5/2025      Brief HPI / Hospital Course:  Patient's real name is Mr. Phani Rossi, 85-year-old male.  Patient had been found unresponsive in his apartment (unsure why emergency providers were called to his apartment) and brought in to [facility] ED.  Patient had been intubated for respiratory failure and encephalopathy.  Unsure of downtime.  Admitted to ICU thereafter for further evaluation management.  Due to circulatory shock pressors and stress with steroids initiated.    4/3: Clarified patient identity.  Otherwise remains encephalopathic and critically ill.  Of note, blood cultures positive for gram-positive cocci on preliminary results.  4/4: MRI pending. Remains encephalopathic during SAT, but tolerating SBT  4/5: MRI demonstrating scattered strokes in multiple vascular territories consistent with strokes of embolic phenomenon.  Will attempt repeat TTE.  Otherwise continues to tolerate SBT but remains encephalopathic during SAT.    Assessment and plan:    Neuro  Acute metabolic encephalopathy  CVA  - Head CT unremarkable  - Scattered strokes throughout brain and multiple vascular territories noted on MRI, likely due to embolic phenomenon from central source suggesting cardiac etiology given bilateral distribution.  - CVA may be etiology of patient being found down  - Mild sedation for ventilator compliance  - Daily SAT SBT  - Nightly atorvastatin 40 mg; ASA held due to severe thrombocytopenia  - Wernicke's encephalopathy unlikely.  Decreased thiamine to daily dosing only    CV  Endocarditis, likely  Septic shock, improving  - Shock responding well to fluids  - Weaned off pressors   - Intermittently requires pressors when sedation is increased  - Discontinue stress dose steroids.    - Unable to obtain good images on initial TTE   - Will attempt to repeat TTE    Pulmonary  Acute hypoxic respiratory failure  - Continue lung    Eyes:      Extraocular Movements: Extraocular movements intact.      Pupils: Pupils are equal, round, and reactive to light.   Cardiovascular:      Rate and Rhythm: Normal rate and regular rhythm.      Pulses: Normal pulses.      Heart sounds: Normal heart sounds. No murmur heard.  Pulmonary:      Effort: Pulmonary effort is normal. No respiratory distress.      Breath sounds: Normal breath sounds. No wheezing, rhonchi or rales.   Abdominal:      General: Bowel sounds are normal. There is no distension.      Palpations: Abdomen is soft.      Tenderness: There is no abdominal tenderness. There is no guarding or rebound.   Musculoskeletal:         General: No swelling or deformity. Normal range of motion.      Cervical back: Normal range of motion. No rigidity.   Skin:     General: Skin is warm and dry.      Capillary Refill: Capillary refill takes less than 2 seconds.   Neurological:      Mental Status: He is disoriented and unresponsive.   Psychiatric:         Attention and Perception: He is inattentive.         Behavior: Behavior is uncooperative.         LABS & Imaging:   Today's labs and imaging review.            CCM time:   50 mins.  This patient is critically ill with risk of clinical deterioration.  The documented time was time spent providing direct patient care, formulating care plan and discussing this plan with other providers, updating family and discussing goals of care with patient and/or family. It does not include time spent performing any procedures that are billed separtately.    DO [facility]  04/05/25

## 2025-04-05 NOTE — PROGRESS NOTES
Nephrology follow-up          Patient: Jerel Hmam MRN: [id number]  SSN: [id number]    YOB: 1880  Age: 89 y.o.  Sex: male      Subjective:   The patient is seen at the bedside  On vent/sedation  Off single pressor support  Resolving MEGHANA  Good urine output  Resolving hypernatremia    I have discussed with the intensivist Dr. Mancilla.    No past medical history on file.  No past surgical history on file.   No family history on file.  Social History     Tobacco Use    Smoking status: Former     Types: Cigarettes     Start date: 1970     Passive exposure: Current    Smokeless tobacco: Current   Substance Use Topics    Alcohol use: Defer      Current Facility-Administered Medications   Medication Dose Route Frequency Provider Last Rate Last Admin    sodium chloride 0.45 % bolus 1,000 mL  1,000 mL IntraVENous Once Carol Mancilla  mL/hr at 04/05/25 1016 1,000 mL at 04/05/25 1016    0.45 % sodium chloride infusion   IntraVENous Continuous Maren Doyle MD        vancomycin - please draw level on 4/5 at 1200  1 each Other Once Carol Mancilla R, DO        hydrocortisone sodium succinate PF (SOLU-CORTEF) injection 50 mg  50 mg IntraVENous Q6H Carol Mancilla R, DO   50 mg at 04/05/25 0745    polyethylene glycol (GLYCOLAX) packet 17 g  17 g Oral Daily Carol Mancilla R, DO   17 g at 04/05/25 0906    cefTRIAXone (ROCEPHIN) 2,000 mg in sterile water 20 mL IV syringe  2,000 mg IntraVENous Q24H Carol Mancilla R, DO   2,000 mg at 04/05/25 0555    heparin (porcine) injection 5,000 Units  5,000 Units SubCUTAneous 3 times per day Carol Mancilla R, DO   5,000 Units at 04/05/25 0632    glucose chewable tablet 16 g  4 tablet Oral PRN Carol Mancilla R, DO        dextrose bolus 10% 125 mL  125 mL IntraVENous PRN Carol Mancilla R, DO        Or    dextrose bolus 10% 250 mL  250 mL IntraVENous PRN Carol Mancilla R, DO        glucagon injection 1 mg  1 mg SubCUTAneous PRN Carol Mancilla, DO        dextrose 10 %  today.  No idea about baseline kidney function  Clinically volume down  UA positive for UTI  Aguirre in place and good urine output.  Total CPK is mildly elevated  Renal ultrasound no hydronephrosis.  No emergent indication for dialysis.  Continue IV hydration/on water flushes via NG    Hypernatremia  Free water deficit  Sodium 149->144  On hypotonic IV fluids  On water flushes via NG    Severe sepsis/shock  Urinary tract infection  Bacteremia  Off single pressor support  Continue maintenance IV fluids  On IV antibiotics  Blood culture Staph aureus    Acute encephalopathy  Metabolic  CT head no acute process  Unlikely uremia    Anemia  Hemoglobin 9.9  I will sign iron studies and ferritin level.    DVT prophylaxis  Agree with unfractioned subcu heparin            Plan:       Signed By: Maren Doyle MD     April 5, 2025

## 2025-04-06 ENCOUNTER — APPOINTMENT (OUTPATIENT)
Facility: HOSPITAL | Age: 85
End: 2025-04-06
Attending: INTERNAL MEDICINE
Payer: OTHER GOVERNMENT

## 2025-04-06 ENCOUNTER — APPOINTMENT (OUTPATIENT)
Facility: HOSPITAL | Age: 85
End: 2025-04-06
Payer: OTHER GOVERNMENT

## 2025-04-06 LAB
ALBUMIN SERPL-MCNC: 2 G/DL (ref 3.5–5)
ALBUMIN SERPL-MCNC: 2 G/DL (ref 3.5–5)
ALBUMIN/GLOB SERPL: 0.8 (ref 1.1–2.2)
ALBUMIN/GLOB SERPL: 0.8 (ref 1.1–2.2)
ALP SERPL-CCNC: 71 U/L (ref 45–117)
ALP SERPL-CCNC: 71 U/L (ref 45–117)
ALT SERPL-CCNC: 50 U/L (ref 12–78)
ALT SERPL-CCNC: 50 U/L (ref 12–78)
AMMONIA PLAS-SCNC: 29 UMOL/L
AMMONIA PLAS-SCNC: 29 UMOL/L
ANION GAP SERPL CALC-SCNC: 5 MMOL/L (ref 2–12)
ANION GAP SERPL CALC-SCNC: 5 MMOL/L (ref 2–12)
APTT PPP: 43.6 SEC (ref 21.2–34.1)
APTT PPP: 43.6 SEC (ref 21.2–34.1)
AST SERPL W P-5'-P-CCNC: 85 U/L (ref 15–37)
AST SERPL W P-5'-P-CCNC: 85 U/L (ref 15–37)
BACTERIA SPEC CULT: NORMAL
BASOPHILS # BLD: 0 K/UL (ref 0–0.1)
BASOPHILS # BLD: 0 K/UL (ref 0–0.1)
BASOPHILS NFR BLD: 0 % (ref 0–1)
BASOPHILS NFR BLD: 0 % (ref 0–1)
BILIRUB SERPL-MCNC: 0.8 MG/DL (ref 0.2–1)
BILIRUB SERPL-MCNC: 0.8 MG/DL (ref 0.2–1)
BUN SERPL-MCNC: 81 MG/DL (ref 6–20)
BUN SERPL-MCNC: 81 MG/DL (ref 6–20)
BUN/CREAT SERPL: 54 (ref 12–20)
BUN/CREAT SERPL: 54 (ref 12–20)
CA-I BLD-MCNC: 7.7 MG/DL (ref 8.5–10.1)
CA-I BLD-MCNC: 7.7 MG/DL (ref 8.5–10.1)
CHLORIDE SERPL-SCNC: 122 MMOL/L (ref 97–108)
CHLORIDE SERPL-SCNC: 122 MMOL/L (ref 97–108)
CO2 SERPL-SCNC: 20 MMOL/L (ref 21–32)
CO2 SERPL-SCNC: 20 MMOL/L (ref 21–32)
CREAT SERPL-MCNC: 1.5 MG/DL (ref 0.7–1.3)
CREAT SERPL-MCNC: 1.5 MG/DL (ref 0.7–1.3)
DATE LAST DOSE: NORMAL
DATE LAST DOSE: NORMAL
DIFFERENTIAL METHOD BLD: ABNORMAL
DIFFERENTIAL METHOD BLD: ABNORMAL
DOSE AMOUNT: NORMAL UNITS
DOSE AMOUNT: NORMAL UNITS
ECHO AO ASC DIAM: 3.5 CM
ECHO AO ASC DIAM: 3.5 CM
ECHO AO ASCENDING AORTA INDEX: 1.88 CM/M2
ECHO AO ASCENDING AORTA INDEX: 1.88 CM/M2
ECHO AO ROOT DIAM: 3.5 CM
ECHO AO ROOT DIAM: 3.5 CM
ECHO AO ROOT INDEX: 1.88 CM/M2
ECHO AO ROOT INDEX: 1.88 CM/M2
ECHO IVC EXP: 1.2 CM
ECHO IVC EXP: 1.2 CM
ECHO LV EDV A2C: 56 ML
ECHO LV EDV A2C: 56 ML
ECHO LV EDV A4C: 90 ML
ECHO LV EDV A4C: 90 ML
ECHO LV EDV INDEX A4C: 48 ML/M2
ECHO LV EDV INDEX A4C: 48 ML/M2
ECHO LV EDV NDEX A2C: 30 ML/M2
ECHO LV EDV NDEX A2C: 30 ML/M2
ECHO LV EF PHYSICIAN: 55 %
ECHO LV EF PHYSICIAN: 55 %
ECHO LV EJECTION FRACTION A2C: 44 %
ECHO LV EJECTION FRACTION A2C: 44 %
ECHO LV EJECTION FRACTION A4C: 58 %
ECHO LV EJECTION FRACTION A4C: 58 %
ECHO LV EJECTION FRACTION BIPLANE: 53 % (ref 55–100)
ECHO LV EJECTION FRACTION BIPLANE: 53 % (ref 55–100)
ECHO LV ESV A2C: 31 ML
ECHO LV ESV A2C: 31 ML
ECHO LV ESV A4C: 38 ML
ECHO LV ESV A4C: 38 ML
ECHO LV ESV INDEX A2C: 17 ML/M2
ECHO LV ESV INDEX A2C: 17 ML/M2
ECHO LV ESV INDEX A4C: 20 ML/M2
ECHO LV ESV INDEX A4C: 20 ML/M2
ECHO LV FRACTIONAL SHORTENING: 27 % (ref 28–44)
ECHO LV FRACTIONAL SHORTENING: 27 % (ref 28–44)
ECHO LV INTERNAL DIMENSION DIASTOLE INDEX: 2.58 CM/M2
ECHO LV INTERNAL DIMENSION DIASTOLE INDEX: 2.58 CM/M2
ECHO LV INTERNAL DIMENSION DIASTOLIC: 4.8 CM (ref 4.2–5.9)
ECHO LV INTERNAL DIMENSION DIASTOLIC: 4.8 CM (ref 4.2–5.9)
ECHO LV INTERNAL DIMENSION SYSTOLIC INDEX: 1.88 CM/M2
ECHO LV INTERNAL DIMENSION SYSTOLIC INDEX: 1.88 CM/M2
ECHO LV INTERNAL DIMENSION SYSTOLIC: 3.5 CM
ECHO LV INTERNAL DIMENSION SYSTOLIC: 3.5 CM
ECHO LV IVSD: 1 CM (ref 0.6–1)
ECHO LV IVSD: 1 CM (ref 0.6–1)
ECHO LV MASS 2D: 170.2 G (ref 88–224)
ECHO LV MASS 2D: 170.2 G (ref 88–224)
ECHO LV MASS INDEX 2D: 91.5 G/M2 (ref 49–115)
ECHO LV MASS INDEX 2D: 91.5 G/M2 (ref 49–115)
ECHO LV POSTERIOR WALL DIASTOLIC: 1 CM (ref 0.6–1)
ECHO LV POSTERIOR WALL DIASTOLIC: 1 CM (ref 0.6–1)
ECHO LV RELATIVE WALL THICKNESS RATIO: 0.42
ECHO LV RELATIVE WALL THICKNESS RATIO: 0.42
ECHO LVOT AREA: 3.8 CM2
ECHO LVOT AREA: 3.8 CM2
ECHO LVOT DIAM: 2.2 CM
ECHO LVOT DIAM: 2.2 CM
ECHO LVOT MEAN GRADIENT: 2 MMHG
ECHO LVOT MEAN GRADIENT: 2 MMHG
ECHO LVOT PEAK GRADIENT: 3 MMHG
ECHO LVOT PEAK GRADIENT: 3 MMHG
ECHO LVOT PEAK VELOCITY: 0.9 M/S
ECHO LVOT PEAK VELOCITY: 0.9 M/S
ECHO LVOT STROKE VOLUME INDEX: 25.3 ML/M2
ECHO LVOT STROKE VOLUME INDEX: 25.3 ML/M2
ECHO LVOT SV: 47.1 ML
ECHO LVOT SV: 47.1 ML
ECHO LVOT VTI: 12.4 CM
ECHO LVOT VTI: 12.4 CM
ECHO TV REGURGITANT MAX VELOCITY: 2.34 M/S
ECHO TV REGURGITANT MAX VELOCITY: 2.34 M/S
ECHO TV REGURGITANT PEAK GRADIENT: 22 MMHG
ECHO TV REGURGITANT PEAK GRADIENT: 22 MMHG
EOSINOPHIL # BLD: 0 K/UL (ref 0–0.4)
EOSINOPHIL # BLD: 0 K/UL (ref 0–0.4)
EOSINOPHIL NFR BLD: 0 % (ref 0–7)
EOSINOPHIL NFR BLD: 0 % (ref 0–7)
ERYTHROCYTE [DISTWIDTH] IN BLOOD BY AUTOMATED COUNT: 14.6 % (ref 11.5–14.5)
ERYTHROCYTE [DISTWIDTH] IN BLOOD BY AUTOMATED COUNT: 14.6 % (ref 11.5–14.5)
GLOBULIN SER CALC-MCNC: 2.4 G/DL (ref 2–4)
GLOBULIN SER CALC-MCNC: 2.4 G/DL (ref 2–4)
GLUCOSE BLD STRIP.AUTO-MCNC: 102 MG/DL (ref 65–100)
GLUCOSE BLD STRIP.AUTO-MCNC: 102 MG/DL (ref 65–100)
GLUCOSE BLD STRIP.AUTO-MCNC: 105 MG/DL (ref 65–100)
GLUCOSE BLD STRIP.AUTO-MCNC: 105 MG/DL (ref 65–100)
GLUCOSE BLD STRIP.AUTO-MCNC: 84 MG/DL (ref 65–100)
GLUCOSE BLD STRIP.AUTO-MCNC: 84 MG/DL (ref 65–100)
GLUCOSE BLD STRIP.AUTO-MCNC: 91 MG/DL (ref 65–100)
GLUCOSE BLD STRIP.AUTO-MCNC: 91 MG/DL (ref 65–100)
GLUCOSE SERPL-MCNC: 125 MG/DL (ref 65–100)
GLUCOSE SERPL-MCNC: 125 MG/DL (ref 65–100)
HCT VFR BLD AUTO: 30.5 % (ref 36.6–50.3)
HCT VFR BLD AUTO: 30.5 % (ref 36.6–50.3)
HGB BLD-MCNC: 10.1 G/DL (ref 12.1–17)
HGB BLD-MCNC: 10.1 G/DL (ref 12.1–17)
IMM GRANULOCYTES # BLD AUTO: 0 K/UL
IMM GRANULOCYTES # BLD AUTO: 0 K/UL
IMM GRANULOCYTES NFR BLD AUTO: 0 %
IMM GRANULOCYTES NFR BLD AUTO: 0 %
INR PPP: 1.3 (ref 0.9–1.1)
INR PPP: 1.3 (ref 0.9–1.1)
LACTATE SERPL-SCNC: 1.6 MMOL/L (ref 0.4–2)
LACTATE SERPL-SCNC: 1.6 MMOL/L (ref 0.4–2)
LYMPHOCYTES # BLD: 0.71 K/UL (ref 0.8–3.5)
LYMPHOCYTES # BLD: 0.71 K/UL (ref 0.8–3.5)
LYMPHOCYTES NFR BLD: 5 % (ref 12–49)
LYMPHOCYTES NFR BLD: 5 % (ref 12–49)
Lab: NORMAL
Lab: NORMAL
MAGNESIUM SERPL-MCNC: 2.3 MG/DL (ref 1.6–2.4)
MAGNESIUM SERPL-MCNC: 2.3 MG/DL (ref 1.6–2.4)
MCH RBC QN AUTO: 31.1 PG (ref 26–34)
MCH RBC QN AUTO: 31.1 PG (ref 26–34)
MCHC RBC AUTO-ENTMCNC: 33.1 G/DL (ref 30–36.5)
MCHC RBC AUTO-ENTMCNC: 33.1 G/DL (ref 30–36.5)
MCV RBC AUTO: 93.8 FL (ref 80–99)
MCV RBC AUTO: 93.8 FL (ref 80–99)
MONOCYTES # BLD: 0.71 K/UL (ref 0–1)
MONOCYTES # BLD: 0.71 K/UL (ref 0–1)
MONOCYTES NFR BLD: 5 % (ref 5–13)
MONOCYTES NFR BLD: 5 % (ref 5–13)
NEUTS BAND NFR BLD MANUAL: 1 % (ref 0–6)
NEUTS BAND NFR BLD MANUAL: 1 % (ref 0–6)
NEUTS SEG # BLD: 12.78 K/UL (ref 1.8–8)
NEUTS SEG # BLD: 12.78 K/UL (ref 1.8–8)
NEUTS SEG NFR BLD: 89 % (ref 32–75)
NEUTS SEG NFR BLD: 89 % (ref 32–75)
NRBC # BLD: 0.08 K/UL (ref 0–0.01)
NRBC # BLD: 0.08 K/UL (ref 0–0.01)
NRBC BLD-RTO: 0.6 PER 100 WBC
NRBC BLD-RTO: 0.6 PER 100 WBC
PERFORMED BY:: ABNORMAL
PERFORMED BY:: NORMAL
PHOSPHATE SERPL-MCNC: 2.4 MG/DL (ref 2.6–4.7)
PHOSPHATE SERPL-MCNC: 2.4 MG/DL (ref 2.6–4.7)
PLATELET # BLD AUTO: 58 K/UL (ref 150–400)
PLATELET # BLD AUTO: 58 K/UL (ref 150–400)
PMV BLD AUTO: 12.8 FL (ref 8.9–12.9)
PMV BLD AUTO: 12.8 FL (ref 8.9–12.9)
POTASSIUM SERPL-SCNC: 3.7 MMOL/L (ref 3.5–5.1)
POTASSIUM SERPL-SCNC: 3.7 MMOL/L (ref 3.5–5.1)
PROT SERPL-MCNC: 4.4 G/DL (ref 6.4–8.2)
PROT SERPL-MCNC: 4.4 G/DL (ref 6.4–8.2)
PROTHROMBIN TIME: 16.6 SEC (ref 11.9–14.6)
PROTHROMBIN TIME: 16.6 SEC (ref 11.9–14.6)
RBC # BLD AUTO: 3.25 M/UL (ref 4.1–5.7)
RBC # BLD AUTO: 3.25 M/UL (ref 4.1–5.7)
RBC MORPH BLD: ABNORMAL
SODIUM SERPL-SCNC: 147 MMOL/L (ref 136–145)
SODIUM SERPL-SCNC: 147 MMOL/L (ref 136–145)
THERAPEUTIC RANGE: ABNORMAL SEC (ref 82–109)
THERAPEUTIC RANGE: ABNORMAL SEC (ref 82–109)
UFH PPP CHRO-ACNC: <0.1 IU/ML
UFH PPP CHRO-ACNC: <0.1 IU/ML
VANCOMYCIN SERPL-MCNC: 18.6 UG/ML
VANCOMYCIN SERPL-MCNC: 18.6 UG/ML
WBC # BLD AUTO: 14.2 K/UL (ref 4.1–11.1)
WBC # BLD AUTO: 14.2 K/UL (ref 4.1–11.1)

## 2025-04-06 PROCEDURE — 84100 ASSAY OF PHOSPHORUS: CPT

## 2025-04-06 PROCEDURE — 80202 ASSAY OF VANCOMYCIN: CPT

## 2025-04-06 PROCEDURE — 74176 CT ABD & PELVIS W/O CONTRAST: CPT

## 2025-04-06 PROCEDURE — 70486 CT MAXILLOFACIAL W/O DYE: CPT

## 2025-04-06 PROCEDURE — 2700000000 HC OXYGEN THERAPY PER DAY

## 2025-04-06 PROCEDURE — 85520 HEPARIN ASSAY: CPT

## 2025-04-06 PROCEDURE — 71045 X-RAY EXAM CHEST 1 VIEW: CPT

## 2025-04-06 PROCEDURE — 85610 PROTHROMBIN TIME: CPT

## 2025-04-06 PROCEDURE — 2000000000 HC ICU R&B

## 2025-04-06 PROCEDURE — 6360000002 HC RX W HCPCS: Performed by: INTERNAL MEDICINE

## 2025-04-06 PROCEDURE — 99223 1ST HOSP IP/OBS HIGH 75: CPT | Performed by: PSYCHIATRY & NEUROLOGY

## 2025-04-06 PROCEDURE — 83605 ASSAY OF LACTIC ACID: CPT

## 2025-04-06 PROCEDURE — 6360000004 HC RX CONTRAST MEDICATION

## 2025-04-06 PROCEDURE — 2500000003 HC RX 250 WO HCPCS: Performed by: INTERNAL MEDICINE

## 2025-04-06 PROCEDURE — 2500000003 HC RX 250 WO HCPCS: Performed by: STUDENT IN AN ORGANIZED HEALTH CARE EDUCATION/TRAINING PROGRAM

## 2025-04-06 PROCEDURE — 86022 PLATELET ANTIBODIES: CPT

## 2025-04-06 PROCEDURE — 87040 BLOOD CULTURE FOR BACTERIA: CPT

## 2025-04-06 PROCEDURE — 80053 COMPREHEN METABOLIC PANEL: CPT

## 2025-04-06 PROCEDURE — 6370000000 HC RX 637 (ALT 250 FOR IP): Performed by: STUDENT IN AN ORGANIZED HEALTH CARE EDUCATION/TRAINING PROGRAM

## 2025-04-06 PROCEDURE — 85730 THROMBOPLASTIN TIME PARTIAL: CPT

## 2025-04-06 PROCEDURE — 83735 ASSAY OF MAGNESIUM: CPT

## 2025-04-06 PROCEDURE — 6360000002 HC RX W HCPCS: Performed by: STUDENT IN AN ORGANIZED HEALTH CARE EDUCATION/TRAINING PROGRAM

## 2025-04-06 PROCEDURE — 82962 GLUCOSE BLOOD TEST: CPT

## 2025-04-06 PROCEDURE — 85025 COMPLETE CBC W/AUTO DIFF WBC: CPT

## 2025-04-06 PROCEDURE — 94003 VENT MGMT INPAT SUBQ DAY: CPT

## 2025-04-06 PROCEDURE — 82140 ASSAY OF AMMONIA: CPT

## 2025-04-06 PROCEDURE — 2580000003 HC RX 258: Performed by: INTERNAL MEDICINE

## 2025-04-06 PROCEDURE — C8924 2D TTE W OR W/O FOL W/CON,FU: HCPCS

## 2025-04-06 PROCEDURE — 93321 DOPPLER ECHO F-UP/LMTD STD: CPT

## 2025-04-06 RX ORDER — HEPARIN SODIUM 10000 [USP'U]/100ML
5-30 INJECTION, SOLUTION INTRAVENOUS CONTINUOUS
Status: DISCONTINUED | OUTPATIENT
Start: 2025-04-06 | End: 2025-04-06

## 2025-04-06 RX ORDER — FUROSEMIDE 10 MG/ML
20 INJECTION INTRAMUSCULAR; INTRAVENOUS ONCE
Status: COMPLETED | OUTPATIENT
Start: 2025-04-06 | End: 2025-04-06

## 2025-04-06 RX ADMIN — FUROSEMIDE 20 MG: 10 INJECTION, SOLUTION INTRAMUSCULAR; INTRAVENOUS at 08:24

## 2025-04-06 RX ADMIN — POLYETHYLENE GLYCOL 3350 17 G: 17 POWDER, FOR SOLUTION ORAL at 08:24

## 2025-04-06 RX ADMIN — Medication: at 08:35

## 2025-04-06 RX ADMIN — Medication: at 20:19

## 2025-04-06 RX ADMIN — HEPARIN SODIUM AND DEXTROSE 12 UNITS/KG/HR: 10000; 5 INJECTION INTRAVENOUS at 08:45

## 2025-04-06 RX ADMIN — SODIUM CHLORIDE: 4.5 INJECTION, SOLUTION INTRAVENOUS at 07:56

## 2025-04-06 RX ADMIN — THIAMINE HCL TAB 100 MG 100 MG: 100 TAB at 08:24

## 2025-04-06 RX ADMIN — CEFTRIAXONE SODIUM 2000 MG: 2 INJECTION, POWDER, FOR SOLUTION INTRAMUSCULAR; INTRAVENOUS at 06:03

## 2025-04-06 RX ADMIN — SODIUM CHLORIDE, PRESERVATIVE FREE 20 MG: 5 INJECTION INTRAVENOUS at 08:24

## 2025-04-06 RX ADMIN — ATORVASTATIN CALCIUM 40 MG: 40 TABLET, FILM COATED ORAL at 20:18

## 2025-04-06 RX ADMIN — SULFUR HEXAFLUORIDE 5 ML: 60.7; .19; .19 INJECTION, POWDER, LYOPHILIZED, FOR SUSPENSION INTRAVENOUS; INTRAVESICAL at 18:04

## 2025-04-06 ASSESSMENT — PULMONARY FUNCTION TESTS
PIF_VALUE: 17
PIF_VALUE: 18
PIF_VALUE: 19
PIF_VALUE: 17
PIF_VALUE: 19
PIF_VALUE: 17
PIF_VALUE: 19
PIF_VALUE: 18
PIF_VALUE: 19
PIF_VALUE: 18
PIF_VALUE: 17
PIF_VALUE: 18
PIF_VALUE: 17
PIF_VALUE: 18
PIF_VALUE: 17
PIF_VALUE: 18
PIF_VALUE: 19
PIF_VALUE: 17
PIF_VALUE: 19
PIF_VALUE: 18
PIF_VALUE: 17
PIF_VALUE: 18
PIF_VALUE: 19
PIF_VALUE: 17
PIF_VALUE: 19

## 2025-04-06 ASSESSMENT — PAIN SCALES - GENERAL: PAINLEVEL_OUTOF10: 0

## 2025-04-06 NOTE — PLAN OF CARE
Problem: Safety - Adult  Goal: Free from fall injury  Outcome: Progressing     Problem: Discharge Planning  Goal: Discharge to home or other facility with appropriate resources  Outcome: Progressing     Problem: Neurosensory - Adult  Goal: Absence of seizures  Outcome: Progressing  Goal: Remains free of injury related to seizures activity  Outcome: Progressing     Problem: Respiratory - Adult  Goal: Achieves optimal ventilation and oxygenation  Outcome: Progressing     Problem: Cardiovascular - Adult  Goal: Maintains optimal cardiac output and hemodynamic stability  Outcome: Progressing     Problem: Skin/Tissue Integrity - Adult  Goal: Incisions, wounds, or drain sites healing without S/S of infection  Outcome: Progressing

## 2025-04-06 NOTE — PROGRESS NOTES
[facility] Progress Note        Jerel Velasco Xxhavana  1/1/1880  [id number]  4/6/2025      Brief HPI / Hospital Course:  Patient's real name is Mr. Phani Rossi, 85-year-old male.  Patient had been found unresponsive in his apartment (unsure why emergency providers were called to his apartment) and brought in to [facility] ED.  Patient had been intubated for respiratory failure and encephalopathy.  Unsure of downtime.  Admitted to ICU thereafter for further evaluation management.  Due to circulatory shock pressors and stress with steroids initiated.    4/3: Clarified patient identity.  Otherwise remains encephalopathic and critically ill.  Of note, blood cultures positive for gram-positive cocci on preliminary results.  4/4: MRI pending. Remains encephalopathic during SAT, but tolerating SBT  4/5: MRI demonstrating scattered strokes in multiple vascular territories consistent with strokes of embolic phenomenon.  Will attempt repeat TTE.  Otherwise continues to tolerate SBT but remains encephalopathic during SAT.  4/6: Echo showed EF of 40 to 42%, no thrombus was noted, overnight had asynchrony with the vent and switch to AC PC, creatinine has improved to 1.50, platelet has dropped to 58, blood culture was staph  Pulmonary's and MRSA screen positive, chest x-ray today morning reported pneumomediastinum, patient has significant facial bone wound noted.  Currently on propofol of 20, half-normal saline at 125 cc/h      Assessment and plan:  Altered mental status  Cardiomyopathy with EF of 40 to 42%  Sepsis with staph l positive blood culture and MRSA screen positive  Urinary tract infection  Hypernatremia improved  Facial bone wound on the right side  Generalized anasarca  Thrombocytopenia  Questionable pneumomediastinum    Addendum:  CT chest abdomen pelvis did not reveal pneumomediastinum which was reported on the chest x-ray, CT face was done due to skin breakdown and concern for osteo-, no evidence of any

## 2025-04-06 NOTE — PROGRESS NOTES
Patient vent mode changed from VC to PC due to patient asynchrony and inconsistent volumes causing low VT alarms. After change, more consistent volumes noted and less ventilator alarms. Patient tolerating well with no noted distress

## 2025-04-06 NOTE — PROGRESS NOTES
Nephrology follow-up          Patient: Jerel Hamm MRN: [id number]  SSN: [id number]    YOB: 1880  Age: 89 y.o.  Sex: male      Subjective:   The patient is seen at the bedside  On vent/sedation  Off single pressor support  Resolving MEGHANA  Good urine output  Resolving hypernatremia    I have discussed with the intensivist Dr. Donovan.    No past medical history on file.  No past surgical history on file.   No family history on file.  Social History     Tobacco Use    Smoking status: Former     Types: Cigarettes     Start date: 1970     Passive exposure: Current    Smokeless tobacco: Current   Substance Use Topics    Alcohol use: Defer      Current Facility-Administered Medications   Medication Dose Route Frequency Provider Last Rate Last Admin    0.45 % sodium chloride infusion   IntraVENous Continuous Linda Donovan MD 25 mL/hr at 04/06/25 0824 Rate Change at 04/06/25 0824    Vancomycin Level- Please draw level on 4/6 @1300   Other Once Carol Mancilla R,         [Held by provider] aspirin chewable tablet 81 mg  81 mg Oral Daily Carol Mancilla R, DO        atorvastatin (LIPITOR) tablet 40 mg  40 mg Oral Nightly Carol Mancilla R, DO   40 mg at 04/05/25 2009    thiamine mononitrate tablet 100 mg  100 mg Oral Daily Carol Mancilla R, DO   100 mg at 04/06/25 0824    polyethylene glycol (GLYCOLAX) packet 17 g  17 g Oral Daily Carol Mancilla R, DO   17 g at 04/06/25 0824    cefTRIAXone (ROCEPHIN) 2,000 mg in sterile water 20 mL IV syringe  2,000 mg IntraVENous Q24H Carol Mancilla R, DO   2,000 mg at 04/06/25 0603    glucose chewable tablet 16 g  4 tablet Oral PRN Carol Mancilla R, DO        dextrose bolus 10% 125 mL  125 mL IntraVENous PRN Carol Mancilla R, DO        Or    dextrose bolus 10% 250 mL  250 mL IntraVENous PRN Carol Mancilla R, DO        glucagon injection 1 mg  1 mg SubCUTAneous PRN Carol Mancilla R, DO        dextrose 10 % infusion   IntraVENous Continuous PRN Carol Mancilla R, DO        insulin  deficit  Sodium 149->144->147  On hypotonic IV fluids  On water flushes via NG    Severe sepsis/shock  Urinary tract infection  Bacteremia  Off single pressor support  Continue maintenance IV fluids  On IV antibiotics  Blood culture Staph aureus    Acute encephalopathy  Metabolic  CT head no acute process  Unlikely uremia    Anemia  Hemoglobin 9.9  I will sign iron studies and ferritin level.    DVT prophylaxis  Agree with unfractioned subcu heparin            Plan:       Signed By: Maren Doyle MD     April 6, 2025

## 2025-04-06 NOTE — CONSULTS
[facility] NEUROLOGY CONSULTATION          Chief Complaint/Admission Diagnosis: Hypernatremia [E87.0]  NSTEMI (non-ST elevated myocardial infarction) (HCC) [I21.4]  Acute kidney injury [N17.9]  Acute respiratory failure with hypoxia [J96.01]  Acute respiratory failure with hypoxemia [J96.01]     I have been asked to see this 89 y.o. male in neurological consultation by Linda Collier MD  to render advice and opinion regarding multiple strokes.     ?     Impression/Recommendations:   Mr. Phani Rossi, 85-year-old male with unknown PMH was found down in home, brought to ED found to have embolic strokes in multiple territories.  His exam shows awake individual who is intubated and moves all extremities to touch stimuli and obeys simple command. Pupils are reactive and, is breathing over the ventilator. EF is 40-45%. UTOX is negative.    Patient has embolic stroke and would require a loop recorder to catch Afib on discharge. Volumes of stroke does not appear to be huge overall.  Delirium precautions  Cardiac telemetry  Heparin gtt not indicated.  DAPT for 30 days (Aspirin 325 mg qd plus Plavix 75 mg qd) then switch to Plavix 75 mg qd whenever he is stable.  Atorvastatin 40 mg qd  PT/OT/SLP recommended  Q4h Neurochecks  Cardiac Telemetry  A1c, Cholesterol Panel                Ryan Mars MD  Teleneurologist    HPI:   History was obtained from a review of the electronic record and from the patient and family.??   Patient's real name is Mr. Phani Rossi, 85-year-old male.  Patient had been found unresponsive in his apartment (unsure why emergency providers were called to his apartment) and brought in to [facility] ED.  Patient had been intubated for respiratory failure and encephalopathy.  Unsure of downtime.  Admitted to ICU thereafter for further evaluation management.  Due to circulatory shock pressors and stress with steroids initiated.     4/3: Clarified patient identity.  Otherwise remains  mg/dL   CBC with Auto Differential    Collection Time: 04/06/25  4:35 AM   Result Value Ref Range    WBC 14.2 (H) 4.1 - 11.1 K/uL    RBC 3.25 (L) 4.10 - 5.70 M/uL    Hemoglobin 10.1 (L) 12.1 - 17.0 g/dL    Hematocrit 30.5 (L) 36.6 - 50.3 %    MCV 93.8 80.0 - 99.0 FL    MCH 31.1 26.0 - 34.0 PG    MCHC 33.1 30.0 - 36.5 g/dL    RDW 14.6 (H) 11.5 - 14.5 %    Platelets 58 (L) 150 - 400 K/uL    MPV 12.8 8.9 - 12.9 FL    Nucleated RBCs 0.6 (H) 0.0  WBC    nRBC 0.08 (H) 0.00 - 0.01 K/uL    Neutrophils % 89.0 (H) 32 - 75 %    Band Neutrophils 1 0 - 6 %    Lymphocytes % 5.0 (L) 12 - 49 %    Monocytes % 5.0 5 - 13 %    Eosinophils % 0.0 0 - 7 %    Basophils % 0.0 0 - 1 %    Immature Granulocytes % 0.0 %    Neutrophils Absolute 12.78 (H) 1.8 - 8.0 K/UL    Lymphocytes Absolute 0.71 (L) 0.8 - 3.5 K/UL    Monocytes Absolute 0.71 0.0 - 1.0 K/UL    Eosinophils Absolute 0.00 0.0 - 0.4 K/UL    Basophils Absolute 0.00 0.0 - 0.1 K/UL    Immature Granulocytes Absolute 0.00 K/UL    Differential Type Manual      RBC Comment Ovalocytes  1+        RBC Comment [place] cells  2+       Ammonia    Collection Time: 04/06/25  4:35 AM   Result Value Ref Range    Ammonia 29 <32 umol/L   POCT Glucose    Collection Time: 04/06/25  7:28 AM   Result Value Ref Range    POC Glucose 102 (H) 65 - 100 mg/dL    Performed by: ALONSO HOBBS    APTT    Collection Time: 04/06/25  8:11 AM   Result Value Ref Range    APTT 43.6 (H) 21.2 - 34.1 sec    Therapeutic Range   82 - 109 sec   Protime-INR    Collection Time: 04/06/25  8:11 AM   Result Value Ref Range    Protime 16.6 (H) 11.9 - 14.6 sec    INR 1.3 (H) 0.9 - 1.1     Anti-Xa, Unfractionated Heparin    Collection Time: 04/06/25  8:11 AM   Result Value Ref Range    Heparin Xa,LMWH and Unfrac <0.10 IU/mL   POCT Glucose    Collection Time: 04/06/25 11:09 AM   Result Value Ref Range    POC Glucose 105 (H) 65 - 100 mg/dL    Performed by: ALONSO HOBBS            Imaging:       CT MAXILLOFACIAL WO  Brian           Video Attestation:             ?I discussed the risks and benefits of a telehealth visit and I obtained the patient's or legally authorized representative's informed verbal consent to conduct this assessment using telehealth tools.? All of the patient’s or legally authorized representative's questions regarding the telehealth interaction were addressed. I provided my name and disclosed to the patient or legally authorized representative my licensure, certification, or registration. ?This consultation was remotely performed at the request of Linda Donovan MD with the assistance of a trained virtual health liaison working at the originating site.? The consultation used real time licensed and encrypted telehealth equipment which allowed a live video connection between my location and the patient's location, [facility].? Aspects of the evaluation that could not be adequately evaluated by virtual assessment were shared with both the patient and the consulting provider.?          A total of 45 minutes of time was spent in direct care and coordination of care with the patient.

## 2025-04-07 ENCOUNTER — APPOINTMENT (OUTPATIENT)
Facility: HOSPITAL | Age: 85
End: 2025-04-07
Payer: OTHER GOVERNMENT

## 2025-04-07 PROBLEM — S01.80XA OPEN WOUND OF FACE: Status: ACTIVE | Noted: 2025-04-07

## 2025-04-07 PROBLEM — T14.8XXA WOUND INFECTION: Status: ACTIVE | Noted: 2025-04-07

## 2025-04-07 PROBLEM — J96.00 ACUTE RESPIRATORY FAILURE, UNSP W HYPOXIA OR HYPERCAPNIA (HCC): Status: ACTIVE | Noted: 2025-04-02

## 2025-04-07 PROBLEM — R78.81 GRAM-NEGATIVE BACTEREMIA: Status: ACTIVE | Noted: 2025-04-07

## 2025-04-07 PROBLEM — A41.9 SEPSIS WITHOUT ACUTE ORGAN DYSFUNCTION (HCC): Status: ACTIVE | Noted: 2025-04-07

## 2025-04-07 PROBLEM — L08.9 WOUND INFECTION: Status: ACTIVE | Noted: 2025-04-07

## 2025-04-07 LAB
ALBUMIN SERPL-MCNC: 2.1 G/DL (ref 3.5–5)
ALBUMIN SERPL-MCNC: 2.1 G/DL (ref 3.5–5)
ALBUMIN/GLOB SERPL: 0.7 (ref 1.1–2.2)
ALBUMIN/GLOB SERPL: 0.7 (ref 1.1–2.2)
ALP SERPL-CCNC: 116 U/L (ref 45–117)
ALP SERPL-CCNC: 116 U/L (ref 45–117)
ALT SERPL-CCNC: 83 U/L (ref 12–78)
ALT SERPL-CCNC: 83 U/L (ref 12–78)
AMMONIA PLAS-SCNC: 30 UMOL/L
AMMONIA PLAS-SCNC: 30 UMOL/L
ANION GAP SERPL CALC-SCNC: 5 MMOL/L (ref 2–12)
ANION GAP SERPL CALC-SCNC: 5 MMOL/L (ref 2–12)
ARTERIAL PATENCY WRIST A: YES
ARTERIAL PATENCY WRIST A: YES
AST SERPL W P-5'-P-CCNC: 148 U/L (ref 15–37)
AST SERPL W P-5'-P-CCNC: 148 U/L (ref 15–37)
BASE DEFICIT BLDA-SCNC: 2.1 MMOL/L
BASE DEFICIT BLDA-SCNC: 2.1 MMOL/L
BASOPHILS # BLD: 0 K/UL (ref 0–0.1)
BASOPHILS # BLD: 0 K/UL (ref 0–0.1)
BASOPHILS NFR BLD: 0 % (ref 0–1)
BASOPHILS NFR BLD: 0 % (ref 0–1)
BDY SITE: ABNORMAL
BDY SITE: ABNORMAL
BILIRUB SERPL-MCNC: 1.1 MG/DL (ref 0.2–1)
BILIRUB SERPL-MCNC: 1.1 MG/DL (ref 0.2–1)
BNP SERPL-MCNC: 8938 PG/ML
BNP SERPL-MCNC: 8938 PG/ML
BODY TEMPERATURE: 98
BODY TEMPERATURE: 98
BUN SERPL-MCNC: 60 MG/DL (ref 6–20)
BUN SERPL-MCNC: 60 MG/DL (ref 6–20)
BUN/CREAT SERPL: 47 (ref 12–20)
BUN/CREAT SERPL: 47 (ref 12–20)
CA-I BLD-MCNC: 8.2 MG/DL (ref 8.5–10.1)
CA-I BLD-MCNC: 8.2 MG/DL (ref 8.5–10.1)
CHLORIDE SERPL-SCNC: 121 MMOL/L (ref 97–108)
CHLORIDE SERPL-SCNC: 121 MMOL/L (ref 97–108)
CK SERPL-CCNC: 809 U/L (ref 39–308)
CK SERPL-CCNC: 809 U/L (ref 39–308)
CO2 SERPL-SCNC: 23 MMOL/L (ref 21–32)
CO2 SERPL-SCNC: 23 MMOL/L (ref 21–32)
COHGB MFR BLD: 0.3 % (ref 1–2)
COHGB MFR BLD: 0.3 % (ref 1–2)
CREAT SERPL-MCNC: 1.28 MG/DL (ref 0.7–1.3)
CREAT SERPL-MCNC: 1.28 MG/DL (ref 0.7–1.3)
DATE LAST DOSE: NORMAL
DATE LAST DOSE: NORMAL
DIFFERENTIAL METHOD BLD: ABNORMAL
DIFFERENTIAL METHOD BLD: ABNORMAL
DOSE AMOUNT: NORMAL UNITS
DOSE AMOUNT: NORMAL UNITS
EOSINOPHIL # BLD: 0 K/UL (ref 0–0.4)
EOSINOPHIL # BLD: 0 K/UL (ref 0–0.4)
EOSINOPHIL NFR BLD: 0 % (ref 0–7)
EOSINOPHIL NFR BLD: 0 % (ref 0–7)
ERYTHROCYTE [DISTWIDTH] IN BLOOD BY AUTOMATED COUNT: 15.4 % (ref 11.5–14.5)
ERYTHROCYTE [DISTWIDTH] IN BLOOD BY AUTOMATED COUNT: 15.4 % (ref 11.5–14.5)
FIO2 ON VENT: 45 %
FIO2 ON VENT: 45 %
GLOBULIN SER CALC-MCNC: 3.2 G/DL (ref 2–4)
GLOBULIN SER CALC-MCNC: 3.2 G/DL (ref 2–4)
GLUCOSE BLD STRIP.AUTO-MCNC: 113 MG/DL (ref 65–100)
GLUCOSE BLD STRIP.AUTO-MCNC: 113 MG/DL (ref 65–100)
GLUCOSE BLD STRIP.AUTO-MCNC: 133 MG/DL (ref 65–100)
GLUCOSE BLD STRIP.AUTO-MCNC: 133 MG/DL (ref 65–100)
GLUCOSE BLD STRIP.AUTO-MCNC: 141 MG/DL (ref 65–100)
GLUCOSE BLD STRIP.AUTO-MCNC: 141 MG/DL (ref 65–100)
GLUCOSE BLD STRIP.AUTO-MCNC: 88 MG/DL (ref 65–100)
GLUCOSE BLD STRIP.AUTO-MCNC: 88 MG/DL (ref 65–100)
GLUCOSE SERPL-MCNC: 95 MG/DL (ref 65–100)
GLUCOSE SERPL-MCNC: 95 MG/DL (ref 65–100)
HCO3 BLDA-SCNC: 19 MMOL/L (ref 22–26)
HCO3 BLDA-SCNC: 19 MMOL/L (ref 22–26)
HCT VFR BLD AUTO: 35 % (ref 36.6–50.3)
HCT VFR BLD AUTO: 35 % (ref 36.6–50.3)
HGB BLD-MCNC: 11.7 G/DL (ref 12.1–17)
HGB BLD-MCNC: 11.7 G/DL (ref 12.1–17)
IMM GRANULOCYTES # BLD AUTO: 0 K/UL
IMM GRANULOCYTES # BLD AUTO: 0 K/UL
IMM GRANULOCYTES NFR BLD AUTO: 0 %
IMM GRANULOCYTES NFR BLD AUTO: 0 %
LIPASE SERPL-CCNC: 372 U/L (ref 13–75)
LIPASE SERPL-CCNC: 372 U/L (ref 13–75)
LYMPHOCYTES # BLD: 5.49 K/UL (ref 0.8–3.5)
LYMPHOCYTES # BLD: 5.49 K/UL (ref 0.8–3.5)
LYMPHOCYTES NFR BLD: 26 % (ref 12–49)
LYMPHOCYTES NFR BLD: 26 % (ref 12–49)
MAGNESIUM SERPL-MCNC: 2 MG/DL (ref 1.6–2.4)
MAGNESIUM SERPL-MCNC: 2 MG/DL (ref 1.6–2.4)
MCH RBC QN AUTO: 31.2 PG (ref 26–34)
MCH RBC QN AUTO: 31.2 PG (ref 26–34)
MCHC RBC AUTO-ENTMCNC: 33.4 G/DL (ref 30–36.5)
MCHC RBC AUTO-ENTMCNC: 33.4 G/DL (ref 30–36.5)
MCV RBC AUTO: 93.3 FL (ref 80–99)
MCV RBC AUTO: 93.3 FL (ref 80–99)
METHGB MFR BLD: 0.4 % (ref 0–1.4)
METHGB MFR BLD: 0.4 % (ref 0–1.4)
MONOCYTES # BLD: 1.48 K/UL (ref 0–1)
MONOCYTES # BLD: 1.48 K/UL (ref 0–1)
MONOCYTES NFR BLD: 7 % (ref 5–13)
MONOCYTES NFR BLD: 7 % (ref 5–13)
NEUTS SEG # BLD: 14.13 K/UL (ref 1.8–8)
NEUTS SEG # BLD: 14.13 K/UL (ref 1.8–8)
NEUTS SEG NFR BLD: 67 % (ref 32–75)
NEUTS SEG NFR BLD: 67 % (ref 32–75)
NRBC # BLD: 0.09 K/UL (ref 0–0.01)
NRBC # BLD: 0.09 K/UL (ref 0–0.01)
NRBC BLD-RTO: 0.4 PER 100 WBC
NRBC BLD-RTO: 0.4 PER 100 WBC
OXYHGB MFR BLD: 92.3 % (ref 95–99)
OXYHGB MFR BLD: 92.3 % (ref 95–99)
PCO2 BLDA: 24 MMHG (ref 35–45)
PCO2 BLDA: 24 MMHG (ref 35–45)
PEEP RESPIRATORY: 6
PEEP RESPIRATORY: 6
PERFORMED BY:: ABNORMAL
PERFORMED BY:: NORMAL
PERFORMED BY:: NORMAL
PH BLDA: 7.52 (ref 7.35–7.45)
PH BLDA: 7.52 (ref 7.35–7.45)
PHOSPHATE SERPL-MCNC: 2.1 MG/DL (ref 2.6–4.7)
PHOSPHATE SERPL-MCNC: 2.1 MG/DL (ref 2.6–4.7)
PLATELET # BLD AUTO: 87 K/UL (ref 150–400)
PLATELET # BLD AUTO: 87 K/UL (ref 150–400)
PMV BLD AUTO: 12.7 FL (ref 8.9–12.9)
PMV BLD AUTO: 12.7 FL (ref 8.9–12.9)
PO2 BLDA: 62 MMHG (ref 80–100)
PO2 BLDA: 62 MMHG (ref 80–100)
POTASSIUM SERPL-SCNC: 3.6 MMOL/L (ref 3.5–5.1)
POTASSIUM SERPL-SCNC: 3.6 MMOL/L (ref 3.5–5.1)
PRESSURE SUPPORT SETTING VENT: 8
PRESSURE SUPPORT SETTING VENT: 8
PROT SERPL-MCNC: 5.3 G/DL (ref 6.4–8.2)
PROT SERPL-MCNC: 5.3 G/DL (ref 6.4–8.2)
RBC # BLD AUTO: 3.75 M/UL (ref 4.1–5.7)
RBC # BLD AUTO: 3.75 M/UL (ref 4.1–5.7)
RBC MORPH BLD: ABNORMAL
RBC MORPH BLD: ABNORMAL
SAO2 % BLD: 93 % (ref 95–99)
SAO2 % BLD: 93 % (ref 95–99)
SAO2% DEVICE SAO2% SENSOR NAME: ABNORMAL
SAO2% DEVICE SAO2% SENSOR NAME: ABNORMAL
SODIUM SERPL-SCNC: 149 MMOL/L (ref 136–145)
SODIUM SERPL-SCNC: 149 MMOL/L (ref 136–145)
SPECIMEN SITE: ABNORMAL
SPECIMEN SITE: ABNORMAL
TROPONIN I SERPL HS-MCNC: 213 NG/L (ref 0–76)
TROPONIN I SERPL HS-MCNC: 213 NG/L (ref 0–76)
VANCOMYCIN SERPL-MCNC: 9.5 UG/ML
VANCOMYCIN SERPL-MCNC: 9.5 UG/ML
WBC # BLD AUTO: 21.1 K/UL (ref 4.1–11.1)
WBC # BLD AUTO: 21.1 K/UL (ref 4.1–11.1)

## 2025-04-07 PROCEDURE — 83690 ASSAY OF LIPASE: CPT

## 2025-04-07 PROCEDURE — 2500000003 HC RX 250 WO HCPCS: Performed by: STUDENT IN AN ORGANIZED HEALTH CARE EDUCATION/TRAINING PROGRAM

## 2025-04-07 PROCEDURE — XX20X89 MONITORING OF BRAIN ELECTRICAL ACTIVITY, COMPUTER-AIDED DETECTION AND NOTIFICATION, NEW TECHNOLOGY GROUP 9: ICD-10-PCS | Performed by: INTERNAL MEDICINE

## 2025-04-07 PROCEDURE — 36600 WITHDRAWAL OF ARTERIAL BLOOD: CPT

## 2025-04-07 PROCEDURE — 99233 SBSQ HOSP IP/OBS HIGH 50: CPT | Performed by: PSYCHIATRY & NEUROLOGY

## 2025-04-07 PROCEDURE — 82140 ASSAY OF AMMONIA: CPT

## 2025-04-07 PROCEDURE — 6370000000 HC RX 637 (ALT 250 FOR IP): Performed by: INTERNAL MEDICINE

## 2025-04-07 PROCEDURE — 83735 ASSAY OF MAGNESIUM: CPT

## 2025-04-07 PROCEDURE — 2700000000 HC OXYGEN THERAPY PER DAY

## 2025-04-07 PROCEDURE — 82550 ASSAY OF CK (CPK): CPT

## 2025-04-07 PROCEDURE — 94003 VENT MGMT INPAT SUBQ DAY: CPT

## 2025-04-07 PROCEDURE — 99223 1ST HOSP IP/OBS HIGH 75: CPT | Performed by: INTERNAL MEDICINE

## 2025-04-07 PROCEDURE — 83880 ASSAY OF NATRIURETIC PEPTIDE: CPT

## 2025-04-07 PROCEDURE — 2500000003 HC RX 250 WO HCPCS: Performed by: INTERNAL MEDICINE

## 2025-04-07 PROCEDURE — 2000000000 HC ICU R&B

## 2025-04-07 PROCEDURE — 87205 SMEAR GRAM STAIN: CPT

## 2025-04-07 PROCEDURE — 6360000002 HC RX W HCPCS: Performed by: INTERNAL MEDICINE

## 2025-04-07 PROCEDURE — 82962 GLUCOSE BLOOD TEST: CPT

## 2025-04-07 PROCEDURE — 2580000003 HC RX 258: Performed by: INTERNAL MEDICINE

## 2025-04-07 PROCEDURE — 6370000000 HC RX 637 (ALT 250 FOR IP): Performed by: STUDENT IN AN ORGANIZED HEALTH CARE EDUCATION/TRAINING PROGRAM

## 2025-04-07 PROCEDURE — 84100 ASSAY OF PHOSPHORUS: CPT

## 2025-04-07 PROCEDURE — 95816 EEG AWAKE AND DROWSY: CPT

## 2025-04-07 PROCEDURE — 36415 COLL VENOUS BLD VENIPUNCTURE: CPT

## 2025-04-07 PROCEDURE — 6360000002 HC RX W HCPCS: Performed by: STUDENT IN AN ORGANIZED HEALTH CARE EDUCATION/TRAINING PROGRAM

## 2025-04-07 PROCEDURE — 82803 BLOOD GASES ANY COMBINATION: CPT

## 2025-04-07 PROCEDURE — 84484 ASSAY OF TROPONIN QUANT: CPT

## 2025-04-07 PROCEDURE — 87070 CULTURE OTHR SPECIMN AEROBIC: CPT

## 2025-04-07 PROCEDURE — 2580000003 HC RX 258: Performed by: STUDENT IN AN ORGANIZED HEALTH CARE EDUCATION/TRAINING PROGRAM

## 2025-04-07 PROCEDURE — 80053 COMPREHEN METABOLIC PANEL: CPT

## 2025-04-07 PROCEDURE — 71045 X-RAY EXAM CHEST 1 VIEW: CPT

## 2025-04-07 PROCEDURE — 85025 COMPLETE CBC W/AUTO DIFF WBC: CPT

## 2025-04-07 PROCEDURE — 80202 ASSAY OF VANCOMYCIN: CPT

## 2025-04-07 RX ORDER — DEXTROSE MONOHYDRATE 50 MG/ML
INJECTION, SOLUTION INTRAVENOUS CONTINUOUS
Status: DISCONTINUED | OUTPATIENT
Start: 2025-04-07 | End: 2025-04-08

## 2025-04-07 RX ORDER — POTASSIUM CHLORIDE 1500 MG/1
40 TABLET, EXTENDED RELEASE ORAL ONCE
Status: DISCONTINUED | OUTPATIENT
Start: 2025-04-07 | End: 2025-04-07

## 2025-04-07 RX ORDER — METOPROLOL TARTRATE 25 MG/1
25 TABLET, FILM COATED ORAL 2 TIMES DAILY
Status: DISCONTINUED | OUTPATIENT
Start: 2025-04-07 | End: 2025-04-09

## 2025-04-07 RX ORDER — HYDRALAZINE HYDROCHLORIDE 20 MG/ML
10 INJECTION INTRAMUSCULAR; INTRAVENOUS EVERY 4 HOURS PRN
Status: DISCONTINUED | OUTPATIENT
Start: 2025-04-07 | End: 2025-04-11

## 2025-04-07 RX ORDER — MUPIROCIN 20 MG/G
OINTMENT TOPICAL 2 TIMES DAILY
Status: DISCONTINUED | OUTPATIENT
Start: 2025-04-08 | End: 2025-04-09

## 2025-04-07 RX ADMIN — SODIUM CHLORIDE: 4.5 INJECTION, SOLUTION INTRAVENOUS at 07:57

## 2025-04-07 RX ADMIN — CEFTRIAXONE SODIUM 2000 MG: 2 INJECTION, POWDER, FOR SOLUTION INTRAMUSCULAR; INTRAVENOUS at 05:48

## 2025-04-07 RX ADMIN — Medication: at 07:55

## 2025-04-07 RX ADMIN — SODIUM CHLORIDE, PRESERVATIVE FREE 20 MG: 5 INJECTION INTRAVENOUS at 07:55

## 2025-04-07 RX ADMIN — METOPROLOL TARTRATE 25 MG: 25 TABLET, FILM COATED ORAL at 20:59

## 2025-04-07 RX ADMIN — POTASSIUM BICARBONATE 40 MEQ: 782 TABLET, EFFERVESCENT ORAL at 08:49

## 2025-04-07 RX ADMIN — THIAMINE HCL TAB 100 MG 100 MG: 100 TAB at 07:55

## 2025-04-07 RX ADMIN — DEXTROSE: 5 SOLUTION INTRAVENOUS at 08:47

## 2025-04-07 RX ADMIN — ASPIRIN 81 MG: 81 TABLET, CHEWABLE ORAL at 08:49

## 2025-04-07 RX ADMIN — VANCOMYCIN HYDROCHLORIDE 1250 MG: 1.25 INJECTION, POWDER, LYOPHILIZED, FOR SOLUTION INTRAVENOUS at 18:22

## 2025-04-07 RX ADMIN — Medication: at 21:00

## 2025-04-07 RX ADMIN — MEROPENEM 1000 MG: 1 INJECTION INTRAVENOUS at 15:34

## 2025-04-07 ASSESSMENT — PULMONARY FUNCTION TESTS
PIF_VALUE: 17
PIF_VALUE: 13
PIF_VALUE: 17
PIF_VALUE: 17
PIF_VALUE: 14
PIF_VALUE: 14
PIF_VALUE: 17
PIF_VALUE: 17
PIF_VALUE: 14
PIF_VALUE: 17
PIF_VALUE: 13
PIF_VALUE: 17
PIF_VALUE: 14
PIF_VALUE: 17
PIF_VALUE: 13
PIF_VALUE: 17
PIF_VALUE: 13
PIF_VALUE: 14

## 2025-04-07 NOTE — PROGRESS NOTES
Renal Dosing/Monitoring  Medication: meropenem 1 g IV q8h  Recent Labs     04/05/25  0300 04/06/25  0435 04/07/25  0408   CREATININE 2.61* 1.50* 1.28   * 81* 60*     Estimated CrCl:  44 mL/min    Plan: Change to meropenem 1g IV q12h  per [facility] P&T Committee Protocol with respect to renal function. Pharmacy will continue to monitor patient daily and will make dosage adjustments based upon changing renal function.

## 2025-04-07 NOTE — PROGRESS NOTES
[facility] Progress Note        Jerel Velasco Xxhavana  1/1/1880  [id number]  4/7/2025      Brief HPI / Hospital Course:  Patient's real name is Mr. Phani Rossi, 85-year-old male.  Patient had been found unresponsive in his apartment (unsure why emergency providers were called to his apartment) and brought in to [facility] ED.  Patient had been intubated for respiratory failure and encephalopathy.  Unsure of downtime.  Admitted to ICU thereafter for further evaluation management.  Due to circulatory shock pressors and stress with steroids initiated.    4/3: Clarified patient identity.  Otherwise remains encephalopathic and critically ill.  Of note, blood cultures positive for gram-positive cocci on preliminary results.  4/4: MRI pending. Remains encephalopathic during SAT, but tolerating SBT  4/5: MRI demonstrating scattered strokes in multiple vascular territories consistent with strokes of embolic phenomenon.  Will attempt repeat TTE.  Otherwise continues to tolerate SBT but remains encephalopathic during SAT.  4/6: Echo showed EF of 40 to 42%, no thrombus was noted, overnight had asynchrony with the vent and switch to AC PC, creatinine has improved to 1.50, platelet has dropped to 58, blood culture was staph  Pulmonary's and MRSA screen positive, chest x-ray today morning reported pneumomediastinum, patient has significant facial bone wound noted.  Currently on propofol of 20, half-normal saline at 125 cc/h  4/7: Currently on CPAP doing well however mentation is not improved, status post CT chest abdomen pelvis did not show any pneumomediastinum, facial CT did not show any bony involvement, repeat echo showed improvement in EF to 50%  Subjective:  Currently on spontaneous pressure support of 7 PEEP of 6  Mild increase in LFTs noted  Creatinine continues to improve to 1.28 sodium has gone up to 149 patient is still about 4400 positive, WBC has gone up to 21,000 platelet improved to 87  No family member  thickness. EF BP is 53%. Normal wall motion.    Aortic Valve: Not well visualized. Mildly calcified cusps. Mild sclerosis of the aortic valve cusps.    Mitral Valve: Not well visualized. Mild annular calcification.    Pericardium: Trivial pericardial effusion present. No indication of cardiac tamponade.    Image quality is technically difficult. Contrast used: Lumason. Technically difficult study, technically difficult study with poor endocardial visualization, technically difficult study due to patient's body habitus, technically difficult study due to low parasternal window and procedure performed with the patient in a sitting position.    Signed by: Simeon Miller on 4/6/2025  7:15 PM               CCM time:   55mins.  This patient is critically ill with risk of clinical deterioration.  The documented time was time spent providing direct patient care, formulating care plan and discussing this plan with other providers, updating family and discussing goals of care with patient and/or family. It does not include time spent performing any procedures that are billed separtately.      04/07/25

## 2025-04-07 NOTE — CARE COORDINATION
CM reviewed Pt medicals, per [employer] a possible grandson was found.    Bob Rossi, [phone]    1:15    CM called Bob Rossi, left a  requesting a return call.     2:00  Dr. Donovan stated that he was able to reach Pt Grandson, Bob Rossi.   Dr. Donovan stated that Pt Grandson does not want to make decision for Pt. Dr. Donovan stated that Pt Grandlorelei stated that he wants his Uncle to make decision. Dr. Donovan stated that Pt Grandlorelei is going to call back with his Uncle's number.    Mau Dodd came to the unit. Hank stated that Phani is his friend. Hank stated that he work maintenance where the Pt lives at.     Hank had Pt SS card, , VA ID, and Medicare Health Insurance.    Hank stated that Pt has a daughter but the Pt would never say her name and had not spoke to her in about 20 years.     CM took Pt information to registration.

## 2025-04-07 NOTE — PROGRESS NOTES
Vancomycin Dosing Consult  Mr. Rossi is a 85 y M with staph lugdunensis and staph xylosus bloodstream infection. Pharmacy was consulted by Dr. Mancilla to dose and monitor vancomycin. Today is day 6.    Antibiotic regimen: Vancomycin + meropenem    Temp (24hrs), Av.7 °F (37.6 °C), Min:98.8 °F (37.1 °C), Max:100.8 °F (38.2 °C)    Recent Labs     25  0300 25  0435 25  0408   WBC 16.1* 14.2* 21.1*   CREATININE 2.61* 1.50* 1.28   * 81* 60*     Est CrCl: 44 ml/hr  Concomitant nephrotoxic drugs: Loop diuretics    Cultures:    blood x 2: staph xylosus in 4 of 4 bottles, staph lugdunensis in 1 of 4 bottles, final  4/3 urine: neg, final   blood: NGTD, prelim    MRSA Swab: Detected     Target range: Re-dose for random level <15 mcg/mL    Recent level history:  Date/Time Dose & Interval Measured Level (mcg/mL) Associated AUC/LONNIE    0837 1750 mg 15.0     1415 1000 mg  14.8     1344 1000 mg  18.6     1445 held 9.5         Assessment/Plan:   Patient febrile. WBC rising. Pressors off.   Level ready for re-dosing. Vancomycin 1250 mg x 1 ordered. Repeat level ordered for  1200.  Antimicrobial stop date 4/15      Jaci Keller, PharmD, BCPS, BCCCP  Clinical Pharmacist   [phone]

## 2025-04-07 NOTE — PROGRESS NOTES
High Sensitivity 213 (HH) 0 - 76 ng/L   Brain Natriuretic Peptide    Collection Time: 04/07/25  4:08 AM   Result Value Ref Range    NT Pro-BNP 8,938 (H) <450 pg/mL   POCT Glucose    Collection Time: 04/07/25  6:26 AM   Result Value Ref Range    POC Glucose 88 65 - 100 mg/dL    Performed by: Ayesha Perdomo    Blood Gas, Arterial    Collection Time: 04/07/25  8:59 AM   Result Value Ref Range    pH, Arterial 7.52 (H) 7.35 - 7.45      pCO2, Arterial 24 (L) 35 - 45 mmHg    pO2, Arterial 62 (L) 80 - 100 mmHg    O2 Sat, Arterial 93 (L) 95 - 99 %    HCO3, Arterial 19 (L) 22 - 26 mmol/L    Base deficit, arterial blood 2.1 mmol/L    O2 Method VENT      FIO2 Arterial 45 %    POC Pressure Support 8      POC PEEP/CPA 6.0      Source Arterial      Site Left Radial      Giuliano Test YES      Carboxyhgb, Arterial 0.3 (L) 1 - 2 %    Methemoglobin, Arterial 0.4 0 - 1.4 %    Oxyhemoglobin 92.3 (L) 95 - 99 %    Performed by: Megan Bruton     Temperature 98.0         ?     Imaging:         XR CHEST PORTABLE  Result Date: 4/7/2025  EXAM:  XR CHEST PORTABLE INDICATION: intubated COMPARISON:  4/6/2025 TECHNIQUE: portable chest AP view FINDINGS: The cardiac silhouette is within normal limits. The pulmonary vasculature is within normal limits. Lung volumes are stable, with mild atelectasis and possible effusion in the left lung base. ET tube has been advanced slightly, with its tip 2.9 cm above the briseyda. Gastric tube traverses the thorax but its tip is not seen. The previously described pneumomediastinum was not corroborated by subsequent chest CT, and is not well visualized on the current study. The visualized bones and upper abdomen are age-appropriate.     Stable left basilar atelectasis and possible small pleural effusion. ET tube and gastric tube as described. Electronically signed by FLORINDA IRENE    Echo (TTE) limited (PRN contrast/bubble/strain/3D)  Result Date: 4/6/2025    Left Ventricle: Normal left ventricular systolic  and upper abdomen are age-appropriate.     Possible minimal pneumomediastinum. Stable lines and tubes. Minimal left basilar atelectasis Electronically signed by FLORINDA IRENE    MRI BRAIN WO CONTRAST  Result Date: 4/5/2025  BRAIN MRI WITHOUT CONTRAST: 4/5/2025 12:31 PM INDICATION:encephalopathy COMPARISON: None. TECHNIQUE: Images were obtained in multiple planes and sequences to emphasize T1, T2, and T2* information. Diffusion-weighted images and ADC maps were also obtained. FINDINGS: Multiple, tiny, acute infarctions are scattered in the cerebrum and cerebellum. Tiny infarctions in multiple vascular territories is consistent with embolic showering from a central source. Small infarctions in the right occipital and bilateral cerebellar hemispheres are chronic. . The ventricles and sulci are appropriate for age. The main intracranial arterial flow-voids are normal. No hemorrhage.     Tiny acute infarctions in multiple vascular territories are consistent with embolic showering from a central source. The findings were conveyed via secure electronic text message ([employer]) to Dr. Carol Mancilla on 4/5/2025 2:44 PM by Dr. Hank Deleon.  789 Electronically signed by Hank Deleon         Video Attestation:        ?I discussed the risks and benefits of a telehealth visit and I obtained the patient's or legally authorized representative's informed verbal consent to conduct this assessment using telehealth tools.? All of the patient’s or legally authorized representative's questions regarding the telehealth interaction were addressed. I provided my name and disclosed to the patient or legally authorized representative my licensure, certification, or registration. ?This consultation was remotely performed at the request of Linda Donovan MD with the assistance of a trained virtual health liaison working at the originating site.? The consultation used real time licensed and encrypted telehealth equipment which

## 2025-04-07 NOTE — CONSULTS
and Rhythm: Normal rate and regular rhythm.      Heart sounds: Normal heart sounds. No murmur heard.  Pulmonary:      Effort: Pulmonary effort is normal.      Breath sounds: Normal breath sounds.   Abdominal:      General: Bowel sounds are normal. There is no distension.      Palpations: Abdomen is soft.      Tenderness: There is no abdominal tenderness.   Genitourinary:     Comments: Indwelling Aguirre catheter  Musculoskeletal:      Cervical back: Neck supple.      Right lower leg: No edema.      Left lower leg: No edema.   Skin:     Findings: No rash.             Comments: Large ecchymotic eschars on both knees, no fluctuance   Neurological:      Comments: intubated   Psychiatric:      Comments: intubated         Labs    CBC:  Recent Labs     04/05/25  0300 04/06/25  0435 04/07/25  0408   WBC 16.1* 14.2* 21.1*   RBC 3.22* 3.25* 3.75*   HGB 9.9* 10.1* 11.7*   HCT 30.5* 30.5* 35.0*   MCV 94.7 93.8 93.3   RDW 14.5 14.6* 15.4*   PLT 52* 58* 87*     CHEMISTRIES:  Recent Labs     04/05/25  0300 04/06/25  0435 04/07/25  0408    147* 149*   K 4.0 3.7 3.6   * 122* 121*   CO2 20* 20* 23   * 81* 60*   CREATININE 2.61* 1.50* 1.28   GLUCOSE 176* 125* 95   PHOS 3.3 2.4* 2.1*   MG 2.5* 2.3 2.0           [facility] Reference Range & Units 04/03/25 14:47   Color, UA -   [place]   Glucose, Ur Negative mg/dL Negative   Bilirubin, Urine Negative   Negative   Ketones, Urine Negative mg/dL 5 !   Specific Gravity, UA 1.003 - 1.030   1.014   Blood, Urine Negative   Large !   Protein, UA Negative mg/dL 100 !   Urobilinogen 0.1 - 1.0 EU/dL 0.1   Nitrite, Urine Negative   Negative   Leukocyte Esterase, Urine Negative   Large !   Appearance Clear   Turbid !   pH, Urine 5.0 - 8.0   7.0   Mucus, UA Negative /lpf 2+ !   WBC, UA 0 - 4 /hpf >100 (H)   RBC, UA 0 - 5 /hpf >100 (H)   Epithelial Cells, UA Few /lpf Few   Bacteria, UA Negative /hpf 3+ !   TRIPLE PHOSPHATE CRYSTALS Negative  1+ !      LIVER PROFILE:  Recent Labs      04/05/25  0300 04/06/25  0435 04/07/25  0408   AST 48* 85* 148*   ALT 39 50 83*   BILITOT 0.6 0.8 1.1*   ALKPHOS 61 71 116     Procal 3.95    MRSA nasal PCR positive    Blood cultures (4/2)     Staphylococcus lugdunensis Staphylococcus xylosus     BACTERIAL SUSCEPTIBILITY PANEL LONNIE BACTERIAL SUSCEPTIBILITY PANEL LONNIE    ciprofloxacin <=0.5 ug/mL Sensitive <=0.5 ug/mL Sensitive    clindamycin <=0.12 ug/mL Sensitive 0.5 ug/mL Sensitive    DAPTOmycin 0.25 ug/mL Sensitive 0.25 ug/mL Sensitive    erythromycin <=0.25 ug/mL Sensitive <=0.25 ug/mL Sensitive    gentamicin <=0.5 ug/mL Sensitive <=0.5 ug/mL Sensitive    levofloxacin 0.25 ug/mL Sensitive 0.5 ug/mL Sensitive    oxacillin 2 ug/mL Sensitive 2 ug/mL Resistant    rifampin <=0.5 ug/mL Sensitive 1 <=0.5 ug/mL Sensitive 1    tetracycline <=1 ug/mL Sensitive <=1 ug/mL Sensitive    trimethoprim-sulfamethoxazole <=10 ug/mL Sensitive <=10 ug/mL Sensitive    vancomycin <=0.5 ug/mL Sensitive <=0.5 ug/mL Sensitive     Blood cultures (4/2) Staphylococcus xylosus  Blood cultures (4/6) No growth 1 day    Urine culture (4/3) No growth FINAL  Imaging/Diagnostics   Personally interpreted by me    XR CHEST PORTABLE  Result Date: 4/7/2025  Stable left basilar atelectasis and possible small pleural effusion. ET tube and gastric tube as described. Electronically signed by FLORINDA IRENE    CT MAXILLOFACIAL WO CONTRAST  Result Date: 4/6/2025  No fracture. Electronically signed by PRAVEEN MARTIN    CT CHEST ABDOMEN PELVIS WO CONTRAST Additional Contrast? None  Result Date: 4/6/2025  Left lower lobe collapse, likely secondary to mucous plugging. Superimposed pneumonia cannot be excluded. No pneumomediastinum. No acute findings in the abdomen/pelvis. Electronically signed by PRAVEEN MARTIN         MRI BRAIN WO CONTRAST  Result Date: 4/5/2025  Tiny acute infarctions in multiple vascular territories are consistent with embolic showering from a central source. The findings were conveyed via  secure electronic text message ([email or web address]) to Dr. Carol Mancilla on 4/5/2025 2:44 PM by Dr. Hank Deleon.  789 Electronically signed by Hank Deleon       US RETROPERITONEAL COMPLETE  Result Date: 4/5/2025  Normal renal ultrasound examination. Electronically signed by Moose Torres       CT HEAD WO CONTRAST  Result Date: 4/2/2025  No acute intracranial hemorrhage, mass or infarct. Right occipital encephalomalacia of completed infarcts, cerebral atrophy and white matter change most compatible with small vessel ischemic and/or senescent change. Intracranial atherosclerosis. Electronically signed by Moose Torres       TTE (4/2/25)   Aortic Valve Not well visualized. Mildly calcified cusps. Mild sclerosis of the aortic valve cusps.  Aortic valve regurgitation.   Mitral Valve Not well visualized. Mild annular calcification.   Tricuspid Valve Not well visualized. Can not rule out vegetaion due to poor image quality. Trace to mild regurgitation.   Pulmonic Valve The pulmonic valve was not well visualized. Can not rule out vegetaion due to poor image quality.     Assessment      Hospital Problems           Last Modified POA    * (Principal) Acute respiratory failure with hypoxemia 4/2/2025 Yes     Sepsis with fever, persistent leukocytosis and elevated procalcitonin  Bacteremia, polymicrobial with Staphylococcus lugdunensis and Staphylococcus xylosus  Probable UTI with marked pyuria and bacteriuria, urine culture no growth, possible false negative  LLL collapse, cannot rule out pneumonia  Acute hypoxic respiratory failure, intubated  Altered mental status with period of unconciousness  7.  Thrombocytopenia, etiology unclear  8.  MRSA nasal colonization    Comment: Staphylococcus lugdunensis is coagulase negative staphylococcus but behaves like Staphylococcus aureus.  TTE showed no vegetations. Staphylococcus xylosus is a skin colonizer and possibly contaminant in the absence of prosthetic valves or joints. Urine

## 2025-04-07 NOTE — PROGRESS NOTES
Comprehensive Nutrition Assessment    Type and Reason for Visit:  Reassess (Interim)    Nutrition Recommendations/Plan:   Modify TF via OGT w/ Promote: advance to a new goal rate of 40 ml/hr+2Prosource BID. Administer 300ml free water every 3 hours per MD.  TF @ goal provides: 960 kcals, 61 g pro, and 798 ml free water  Prosource x4: +160 kcals. +44 g pro  Rodolfo BID: +180 kcals, +5 g pro  D5 @ 75 ml/hr: +306 kcals  TOTAL: 1606 kcals (22 kcals/kg/109%PSU), 110 g pro (1.5 g/kg), and 2598 ml H2O+IVF  Monitor lytes closely and correct as needed; Na 149, D5 added per MD  Monitor weight, fluid status, labs, and bowel fxn/abd exam - noted 3 stools today, monitor closely  4.   Monitor BG levels w/ target range 140-180     Malnutrition Assessment:  Malnutrition Status:  At risk for malnutrition (04/03/25 1250)      Nutrition Assessment:    Presented via EMS after being found down x2-3 days. Admitted to ICU w/ multi-organ failure, intubated on levo. Pt undergoing cerebell. More awake, likely to require sedation. Per MDR discussion, plasn to place OGT and start TF. (4/7) Pt remains in ICU on vent w/ TF @ goal. MRI 4/5 w/ multiple strokes, pt w/ continued poor mentation, tolerating SBT well today. Blood cxr +staph, MRSA screen+. Labs: Na 149, BUN 60, , ALT 83, Phos 2.1, BG 100s-110s. Meds: merrem, glycolax, thiamine, D5 @75ml/hr. Off levo since 4/5, off prop since 4/6.    Nutrition Related Findings:    Signs of mild-mod muscle wasting which may be age-related vs nutr status. No reported n/v/d/c, BM x3 today. Worsening pitting gen, +2pitting BLE, +2 BUE, weeping LUE edema, +10L. Wound Type: Multiple, Pressure Injury, Unstageable, Deep Tissue Injury       Current Nutrition Intake & Therapies:    Average Meal Intake: NPO  Average Supplements Intake: NPO  ADULT ORAL NUTRITION SUPPLEMENT; Breakfast, Dinner; Wound Healing Oral Supplement  ADULT TUBE FEEDING; Nasogastric; Other Tube Feeding (specify); Promote; Continuous; 15;

## 2025-04-07 NOTE — PROGRESS NOTES
9800 Dr. Donovan, got in touch with jhon Rossi. Jhon does not want to make decisions for patient. Has not been in contact with him for the last five years. Jhon said he is going to reach out to his uncle and let him know what is going on and give a call back, with his contact information. Let Attending know ETCO2 has been 21. Wants STAT ABG. Said to resume aspirin, PLT are up to 87. STAT EEG in.     0945 EEG Tech at bedside    0957 Gave Attending ABG results. Wants to decrease PS from 10 to 7. Called respiratory and they will make the change.

## 2025-04-07 NOTE — PROGRESS NOTES
Nephrology follow-up          Patient: Jerel Hamm MRN: [id number]  SSN: [id number]    YOB: 1880  Age: 89 y.o.  Sex: male      Subjective:   The patient is seen at the bedside  On vent/off sedation  Off single pressor support  Resolving MEGHANA  Good urine output  Resolving hypernatremia  Getting EEG    I have discussed with the intensivist Dr. Donovan.    No past medical history on file.  No past surgical history on file.   No family history on file.  Social History     Tobacco Use    Smoking status: Former     Types: Cigarettes     Start date: 1970     Passive exposure: Current    Smokeless tobacco: Current   Substance Use Topics    Alcohol use: Defer      Current Facility-Administered Medications   Medication Dose Route Frequency Provider Last Rate Last Admin    dextrose 5 % solution   IntraVENous Continuous Linda Donovan MD 75 mL/hr at 04/07/25 0847 New Bag at 04/07/25 0847    Vancomycin - Please draw level 4/7 @ 1300 prior to next dose (Call pharmacy if any issues)  1 each Other Once Linda Donovan MD        aspirin chewable tablet 81 mg  81 mg Oral Daily Linda Donovan MD   81 mg at 04/07/25 0849    [Held by provider] atorvastatin (LIPITOR) tablet 40 mg  40 mg Oral Nightly LaurentFrancisco shahidk R, DO   40 mg at 04/06/25 2018    thiamine mononitrate tablet 100 mg  100 mg Oral Daily LaurentCarol shahid R, DO   100 mg at 04/07/25 0755    polyethylene glycol (GLYCOLAX) packet 17 g  17 g Oral Daily LaurentCarol shahid R, DO   17 g at 04/06/25 0824    cefTRIAXone (ROCEPHIN) 2,000 mg in sterile water 20 mL IV syringe  2,000 mg IntraVENous Q24H Francisco Mancillak R, DO   2,000 mg at 04/07/25 0548    glucose chewable tablet 16 g  4 tablet Oral PRN Laurent, Franciscok R, DO        dextrose bolus 10% 125 mL  125 mL IntraVENous PRN Laurent, Franciscok R, DO        Or    dextrose bolus 10% 250 mL  250 mL IntraVENous PRN Laurent, Franciscok R, DO        glucagon injection 1 mg  1 mg SubCUTAneous PRN Carol Mancilla R, DO        dextrose 10 %  infusion   IntraVENous Continuous PRN Carol Mancilla DO        insulin lispro (HUMALOG,ADMELOG) injection vial 0-8 Units  0-8 Units SubCUTAneous 4x Daily AC & HS Carol Mancilla DO   2 Units at 04/05/25 0745    propofol infusion  5-50 mcg/kg/min IntraVENous Continuous Carol Mancilla DO   Stopped at 04/06/25 0941    Vancomycin - Pharmacy to Dose   Other RX Placeholder Carol Mancilla DO        balsum peru-castor oil (VENELEX) ointment   Topical BID Carol Mancilla DO   Given at 04/07/25 0755    famotidine (PEPCID) 20 MG/2ML 20 mg in sodium chloride (PF) 0.9 % 10 mL injection  20 mg IntraVENous Daily Ugo Alan MD   20 mg at 04/07/25 0755    norepinephrine (LEVOPHED) 16 mg in sodium chloride 0.9 % 250 mL infusion (premix)  1-100 mcg/min IntraVENous Continuous Ugo Alan MD   Stopped at 04/05/25 0945    ondansetron (ZOFRAN) injection 4 mg  4 mg IntraVENous Q8H PRN Ugo Alan MD            Not on File    Review of Systems:  Unable to obtain  Objective:     Vitals:    04/07/25 0901 04/07/25 1000 04/07/25 1002 04/07/25 1100   BP: (!) 141/59 (!) 154/107  (!) 146/72   Pulse: (!) 104 91 92 97   Resp: 24 14 16 22   Temp:    98.8 °F (37.1 °C)   TempSrc:    Axillary   SpO2: 95% 100% 100% 100%   Weight:       Height:            Physical Exam:  General: Unresponsive  Eyes: sclera anicteric  Oral Cavity: ET tube in place  Neck: no JVD  Chest: Fair bilateral air entry  Heart: normal sounds  Abdomen: soft and non tender   :  scherer+  Lower Extremities: no edema  Skin: Multiple skin wounds  Neuro: Unresponsive        Assessment/Plan:   Acute kidney injury  2/2 prerenal azotemia from volume depletion/hypotension/sepsis  On admission BUN/creatinine 152/4.4  Resolving MEGHANA, BUN/creatinine 60/1.28 today.  No idea about baseline kidney function  Clinically volume down  UA positive for UTI  Scherer in place and good urine output.  Total CPK is mildly elevated  Renal ultrasound no hydronephrosis.  Continue gentle IV  hydration and water flushes via NG    Hypernatremia  Free water deficit  Sodium 149->144->147->149  On hypotonic IV fluids  On water flushes via NG    Severe sepsis/shock  Urinary tract infection  Bacteremia  Off single pressor support  Continue maintenance IV fluids  On IV antibiotics  Blood culture Staph aureus    Acute encephalopathy  Metabolic  CT head no acute process  Unlikely uremia    Anemia  Hemoglobin 9.9  I will sign iron studies and ferritin level.    DVT prophylaxis  Agree with unfractioned subcu heparin    Hypertension  Elevated blood pressures  Tachycardia  Metoprolol 25 mg twice a day  IV hydralazine 10 mg Q4 hourly as needed        Plan:       Signed By: Maren Doyle MD     April 7, 2025

## 2025-04-08 ENCOUNTER — APPOINTMENT (OUTPATIENT)
Facility: HOSPITAL | Age: 85
End: 2025-04-08
Payer: OTHER GOVERNMENT

## 2025-04-08 LAB
ALBUMIN SERPL-MCNC: 1.6 G/DL (ref 3.5–5)
ALBUMIN SERPL-MCNC: 1.6 G/DL (ref 3.5–5)
ALBUMIN/GLOB SERPL: 0.5 (ref 1.1–2.2)
ALBUMIN/GLOB SERPL: 0.5 (ref 1.1–2.2)
ALP SERPL-CCNC: 119 U/L (ref 45–117)
ALP SERPL-CCNC: 119 U/L (ref 45–117)
ALT SERPL-CCNC: 96 U/L (ref 12–78)
ALT SERPL-CCNC: 96 U/L (ref 12–78)
ANION GAP SERPL CALC-SCNC: 3 MMOL/L (ref 2–12)
ANION GAP SERPL CALC-SCNC: 3 MMOL/L (ref 2–12)
ARTERIAL PATENCY WRIST A: YES
ARTERIAL PATENCY WRIST A: YES
AST SERPL W P-5'-P-CCNC: 153 U/L (ref 15–37)
AST SERPL W P-5'-P-CCNC: 153 U/L (ref 15–37)
BACTERIA SPEC CULT: NORMAL
BASE DEFICIT BLDA-SCNC: 1.5 MMOL/L
BASE DEFICIT BLDA-SCNC: 1.5 MMOL/L
BASOPHILS # BLD: 0 K/UL (ref 0–0.1)
BASOPHILS # BLD: 0 K/UL (ref 0–0.1)
BASOPHILS NFR BLD: 0 % (ref 0–1)
BASOPHILS NFR BLD: 0 % (ref 0–1)
BDY SITE: ABNORMAL
BDY SITE: ABNORMAL
BILIRUB SERPL-MCNC: 0.8 MG/DL (ref 0.2–1)
BILIRUB SERPL-MCNC: 0.8 MG/DL (ref 0.2–1)
BODY TEMPERATURE: 99.1
BODY TEMPERATURE: 99.1
BUN SERPL-MCNC: 37 MG/DL (ref 6–20)
BUN SERPL-MCNC: 37 MG/DL (ref 6–20)
BUN/CREAT SERPL: 41 (ref 12–20)
BUN/CREAT SERPL: 41 (ref 12–20)
CA-I BLD-MCNC: 7.9 MG/DL (ref 8.5–10.1)
CA-I BLD-MCNC: 7.9 MG/DL (ref 8.5–10.1)
CHLORIDE SERPL-SCNC: 118 MMOL/L (ref 97–108)
CHLORIDE SERPL-SCNC: 118 MMOL/L (ref 97–108)
CO2 SERPL-SCNC: 23 MMOL/L (ref 21–32)
CO2 SERPL-SCNC: 23 MMOL/L (ref 21–32)
COHGB MFR BLD: 0.2 % (ref 1–2)
COHGB MFR BLD: 0.2 % (ref 1–2)
CREAT SERPL-MCNC: 0.91 MG/DL (ref 0.7–1.3)
CREAT SERPL-MCNC: 0.91 MG/DL (ref 0.7–1.3)
DATE LAST DOSE: NORMAL
DATE LAST DOSE: NORMAL
DIFFERENTIAL METHOD BLD: ABNORMAL
DIFFERENTIAL METHOD BLD: ABNORMAL
DOSE AMOUNT: NORMAL UNITS
DOSE AMOUNT: NORMAL UNITS
EOSINOPHIL # BLD: 0.17 K/UL (ref 0–0.4)
EOSINOPHIL # BLD: 0.17 K/UL (ref 0–0.4)
EOSINOPHIL NFR BLD: 1 % (ref 0–7)
EOSINOPHIL NFR BLD: 1 % (ref 0–7)
ERYTHROCYTE [DISTWIDTH] IN BLOOD BY AUTOMATED COUNT: 14.9 % (ref 11.5–14.5)
ERYTHROCYTE [DISTWIDTH] IN BLOOD BY AUTOMATED COUNT: 14.9 % (ref 11.5–14.5)
FIO2 ON VENT: 45 %
FIO2 ON VENT: 45 %
GLOBULIN SER CALC-MCNC: 3 G/DL (ref 2–4)
GLOBULIN SER CALC-MCNC: 3 G/DL (ref 2–4)
GLUCOSE BLD STRIP.AUTO-MCNC: 102 MG/DL (ref 65–100)
GLUCOSE BLD STRIP.AUTO-MCNC: 102 MG/DL (ref 65–100)
GLUCOSE BLD STRIP.AUTO-MCNC: 107 MG/DL (ref 65–100)
GLUCOSE BLD STRIP.AUTO-MCNC: 107 MG/DL (ref 65–100)
GLUCOSE BLD STRIP.AUTO-MCNC: 118 MG/DL (ref 65–100)
GLUCOSE BLD STRIP.AUTO-MCNC: 118 MG/DL (ref 65–100)
GLUCOSE BLD STRIP.AUTO-MCNC: 126 MG/DL (ref 65–100)
GLUCOSE BLD STRIP.AUTO-MCNC: 126 MG/DL (ref 65–100)
GLUCOSE SERPL-MCNC: 131 MG/DL (ref 65–100)
GLUCOSE SERPL-MCNC: 131 MG/DL (ref 65–100)
GRAM STN SPEC: NORMAL
HCO3 BLDA-SCNC: 20 MMOL/L (ref 22–26)
HCO3 BLDA-SCNC: 20 MMOL/L (ref 22–26)
HCT VFR BLD AUTO: 30.9 % (ref 36.6–50.3)
HCT VFR BLD AUTO: 30.9 % (ref 36.6–50.3)
HGB BLD-MCNC: 10.2 G/DL (ref 12.1–17)
HGB BLD-MCNC: 10.2 G/DL (ref 12.1–17)
IMM GRANULOCYTES # BLD AUTO: 0 K/UL
IMM GRANULOCYTES # BLD AUTO: 0 K/UL
IMM GRANULOCYTES NFR BLD AUTO: 0 %
IMM GRANULOCYTES NFR BLD AUTO: 0 %
LYMPHOCYTES # BLD: 1.73 K/UL (ref 0.8–3.5)
LYMPHOCYTES # BLD: 1.73 K/UL (ref 0.8–3.5)
LYMPHOCYTES NFR BLD: 10 % (ref 12–49)
LYMPHOCYTES NFR BLD: 10 % (ref 12–49)
Lab: NORMAL
MAGNESIUM SERPL-MCNC: 1.9 MG/DL (ref 1.6–2.4)
MAGNESIUM SERPL-MCNC: 1.9 MG/DL (ref 1.6–2.4)
MCH RBC QN AUTO: 30.8 PG (ref 26–34)
MCH RBC QN AUTO: 30.8 PG (ref 26–34)
MCHC RBC AUTO-ENTMCNC: 33 G/DL (ref 30–36.5)
MCHC RBC AUTO-ENTMCNC: 33 G/DL (ref 30–36.5)
MCV RBC AUTO: 93.4 FL (ref 80–99)
MCV RBC AUTO: 93.4 FL (ref 80–99)
METAMYELOCYTES NFR BLD MANUAL: 1 %
METAMYELOCYTES NFR BLD MANUAL: 1 %
METHGB MFR BLD: 0.5 % (ref 0–1.4)
METHGB MFR BLD: 0.5 % (ref 0–1.4)
MONOCYTES # BLD: 0.17 K/UL (ref 0–1)
MONOCYTES # BLD: 0.17 K/UL (ref 0–1)
MONOCYTES NFR BLD: 1 % (ref 5–13)
MONOCYTES NFR BLD: 1 % (ref 5–13)
NEUTS BAND NFR BLD MANUAL: 1 % (ref 0–6)
NEUTS BAND NFR BLD MANUAL: 1 % (ref 0–6)
NEUTS SEG # BLD: 15.05 K/UL (ref 1.8–8)
NEUTS SEG # BLD: 15.05 K/UL (ref 1.8–8)
NEUTS SEG NFR BLD: 86 % (ref 32–75)
NEUTS SEG NFR BLD: 86 % (ref 32–75)
NRBC # BLD: 0.02 K/UL (ref 0–0.01)
NRBC # BLD: 0.02 K/UL (ref 0–0.01)
NRBC BLD-RTO: 0.1 PER 100 WBC
NRBC BLD-RTO: 0.1 PER 100 WBC
OXYHGB MFR BLD: 97 % (ref 95–99)
OXYHGB MFR BLD: 97 % (ref 95–99)
PCO2 BLDA: 25 MMHG (ref 35–45)
PCO2 BLDA: 25 MMHG (ref 35–45)
PEEP RESPIRATORY: 6
PEEP RESPIRATORY: 6
PERFORMED BY:: ABNORMAL
PF4 HEPARIN CMPLX AB SER-ACNC: 0.11 OD (ref 0–0.4)
PF4 HEPARIN CMPLX AB SER-ACNC: 0.11 OD (ref 0–0.4)
PH BLDA: 7.53 (ref 7.35–7.45)
PH BLDA: 7.53 (ref 7.35–7.45)
PHOSPHATE SERPL-MCNC: 2 MG/DL (ref 2.6–4.7)
PHOSPHATE SERPL-MCNC: 2 MG/DL (ref 2.6–4.7)
PLATELET # BLD AUTO: 101 K/UL (ref 150–400)
PLATELET # BLD AUTO: 101 K/UL (ref 150–400)
PMV BLD AUTO: 12.2 FL (ref 8.9–12.9)
PMV BLD AUTO: 12.2 FL (ref 8.9–12.9)
PO2 BLDA: 115 MMHG (ref 80–100)
PO2 BLDA: 115 MMHG (ref 80–100)
POTASSIUM SERPL-SCNC: 4 MMOL/L (ref 3.5–5.1)
POTASSIUM SERPL-SCNC: 4 MMOL/L (ref 3.5–5.1)
PRESSURE SUPPORT SETTING VENT: 6
PRESSURE SUPPORT SETTING VENT: 6
PROT SERPL-MCNC: 4.6 G/DL (ref 6.4–8.2)
PROT SERPL-MCNC: 4.6 G/DL (ref 6.4–8.2)
RBC # BLD AUTO: 3.31 M/UL (ref 4.1–5.7)
RBC # BLD AUTO: 3.31 M/UL (ref 4.1–5.7)
RBC MORPH BLD: ABNORMAL
RBC MORPH BLD: ABNORMAL
SAO2 % BLD: 98 % (ref 95–99)
SAO2 % BLD: 98 % (ref 95–99)
SAO2% DEVICE SAO2% SENSOR NAME: ABNORMAL
SAO2% DEVICE SAO2% SENSOR NAME: ABNORMAL
SODIUM SERPL-SCNC: 144 MMOL/L (ref 136–145)
SODIUM SERPL-SCNC: 144 MMOL/L (ref 136–145)
SPECIMEN SITE: ABNORMAL
SPECIMEN SITE: ABNORMAL
TRIGL SERPL-MCNC: 77 MG/DL
TRIGL SERPL-MCNC: 77 MG/DL
VANCOMYCIN SERPL-MCNC: 11.6 UG/ML
VANCOMYCIN SERPL-MCNC: 11.6 UG/ML
VENTILATION MODE VENT: ABNORMAL
VENTILATION MODE VENT: ABNORMAL
WBC # BLD AUTO: 17.3 K/UL (ref 4.1–11.1)
WBC # BLD AUTO: 17.3 K/UL (ref 4.1–11.1)

## 2025-04-08 PROCEDURE — 6360000002 HC RX W HCPCS: Performed by: INTERNAL MEDICINE

## 2025-04-08 PROCEDURE — 2500000003 HC RX 250 WO HCPCS: Performed by: INTERNAL MEDICINE

## 2025-04-08 PROCEDURE — 2700000000 HC OXYGEN THERAPY PER DAY

## 2025-04-08 PROCEDURE — 82962 GLUCOSE BLOOD TEST: CPT

## 2025-04-08 PROCEDURE — 6370000000 HC RX 637 (ALT 250 FOR IP): Performed by: INTERNAL MEDICINE

## 2025-04-08 PROCEDURE — 84100 ASSAY OF PHOSPHORUS: CPT

## 2025-04-08 PROCEDURE — 82803 BLOOD GASES ANY COMBINATION: CPT

## 2025-04-08 PROCEDURE — 99232 SBSQ HOSP IP/OBS MODERATE 35: CPT | Performed by: PSYCHIATRY & NEUROLOGY

## 2025-04-08 PROCEDURE — 83735 ASSAY OF MAGNESIUM: CPT

## 2025-04-08 PROCEDURE — 2580000003 HC RX 258: Performed by: INTERNAL MEDICINE

## 2025-04-08 PROCEDURE — 94761 N-INVAS EAR/PLS OXIMETRY MLT: CPT

## 2025-04-08 PROCEDURE — 80202 ASSAY OF VANCOMYCIN: CPT

## 2025-04-08 PROCEDURE — 94003 VENT MGMT INPAT SUBQ DAY: CPT

## 2025-04-08 PROCEDURE — 6370000000 HC RX 637 (ALT 250 FOR IP): Performed by: STUDENT IN AN ORGANIZED HEALTH CARE EDUCATION/TRAINING PROGRAM

## 2025-04-08 PROCEDURE — 36600 WITHDRAWAL OF ARTERIAL BLOOD: CPT

## 2025-04-08 PROCEDURE — 6360000002 HC RX W HCPCS: Performed by: NURSE PRACTITIONER

## 2025-04-08 PROCEDURE — 85025 COMPLETE CBC W/AUTO DIFF WBC: CPT

## 2025-04-08 PROCEDURE — 99233 SBSQ HOSP IP/OBS HIGH 50: CPT | Performed by: INTERNAL MEDICINE

## 2025-04-08 PROCEDURE — 71045 X-RAY EXAM CHEST 1 VIEW: CPT

## 2025-04-08 PROCEDURE — 80053 COMPREHEN METABOLIC PANEL: CPT

## 2025-04-08 PROCEDURE — 2000000000 HC ICU R&B

## 2025-04-08 PROCEDURE — 84478 ASSAY OF TRIGLYCERIDES: CPT

## 2025-04-08 RX ORDER — HEPARIN SODIUM 5000 [USP'U]/ML
5000 INJECTION, SOLUTION INTRAVENOUS; SUBCUTANEOUS EVERY 8 HOURS SCHEDULED
Status: DISCONTINUED | OUTPATIENT
Start: 2025-04-08 | End: 2025-04-08

## 2025-04-08 RX ORDER — LEVOTHYROXINE SODIUM 25 UG/1
25 TABLET ORAL DAILY
Status: DISCONTINUED | OUTPATIENT
Start: 2025-04-08 | End: 2025-04-09

## 2025-04-08 RX ORDER — LORAZEPAM 2 MG/ML
2 INJECTION INTRAMUSCULAR ONCE
Status: COMPLETED | OUTPATIENT
Start: 2025-04-08 | End: 2025-04-08

## 2025-04-08 RX ORDER — FUROSEMIDE 10 MG/ML
20 INJECTION INTRAMUSCULAR; INTRAVENOUS ONCE
Status: COMPLETED | OUTPATIENT
Start: 2025-04-08 | End: 2025-04-08

## 2025-04-08 RX ORDER — FONDAPARINUX SODIUM 2.5 MG/.5ML
2.5 INJECTION SUBCUTANEOUS DAILY
Status: DISCONTINUED | OUTPATIENT
Start: 2025-04-08 | End: 2025-04-09

## 2025-04-08 RX ORDER — LACTOBACILLUS RHAMNOSUS GG 10B CELL
1 CAPSULE ORAL 2 TIMES DAILY
Status: DISCONTINUED | OUTPATIENT
Start: 2025-04-08 | End: 2025-04-09

## 2025-04-08 RX ADMIN — MUPIROCIN: 20 OINTMENT TOPICAL at 00:12

## 2025-04-08 RX ADMIN — Medication 5 MCG/MIN: at 09:13

## 2025-04-08 RX ADMIN — THIAMINE HCL TAB 100 MG 100 MG: 100 TAB at 09:16

## 2025-04-08 RX ADMIN — FUROSEMIDE 20 MG: 10 INJECTION, SOLUTION INTRAMUSCULAR; INTRAVENOUS at 15:31

## 2025-04-08 RX ADMIN — ASPIRIN 81 MG: 81 TABLET, CHEWABLE ORAL at 09:17

## 2025-04-08 RX ADMIN — VANCOMYCIN HYDROCHLORIDE 1250 MG: 1.25 INJECTION, POWDER, LYOPHILIZED, FOR SOLUTION INTRAVENOUS at 22:20

## 2025-04-08 RX ADMIN — LORAZEPAM 2 MG: 2 INJECTION INTRAMUSCULAR; INTRAVENOUS at 22:13

## 2025-04-08 RX ADMIN — Medication: at 09:18

## 2025-04-08 RX ADMIN — MEROPENEM 1000 MG: 1 INJECTION INTRAVENOUS at 13:22

## 2025-04-08 RX ADMIN — MUPIROCIN: 20 OINTMENT TOPICAL at 09:16

## 2025-04-08 RX ADMIN — Medication 1 CAPSULE: at 22:14

## 2025-04-08 RX ADMIN — MEROPENEM 1000 MG: 1 INJECTION INTRAVENOUS at 02:27

## 2025-04-08 RX ADMIN — Medication: at 22:14

## 2025-04-08 RX ADMIN — PSYLLIUM HUSK 1 PACKET: 3.4 POWDER ORAL at 22:14

## 2025-04-08 RX ADMIN — SODIUM CHLORIDE, PRESERVATIVE FREE 20 MG: 5 INJECTION INTRAVENOUS at 09:17

## 2025-04-08 RX ADMIN — DEXTROSE: 5 SOLUTION INTRAVENOUS at 00:13

## 2025-04-08 RX ADMIN — FONDAPARINUX SODIUM 2.5 MG: 2.5 INJECTION, SOLUTION SUBCUTANEOUS at 22:13

## 2025-04-08 RX ADMIN — LEVOTHYROXINE SODIUM 25 MCG: 0.03 TABLET ORAL at 15:31

## 2025-04-08 RX ADMIN — MUPIROCIN: 20 OINTMENT TOPICAL at 22:13

## 2025-04-08 RX ADMIN — ATORVASTATIN CALCIUM 40 MG: 40 TABLET, FILM COATED ORAL at 22:13

## 2025-04-08 ASSESSMENT — PULMONARY FUNCTION TESTS
PIF_VALUE: 13
PIF_VALUE: 12
PIF_VALUE: 13
PIF_VALUE: 13
PIF_VALUE: 12
PIF_VALUE: 13
PIF_VALUE: 14
PIF_VALUE: 13
PIF_VALUE: 13
PIF_VALUE: 12
PIF_VALUE: 13
PIF_VALUE: 12
PIF_VALUE: 13
PIF_VALUE: 12
PIF_VALUE: 13
PIF_VALUE: 12
PIF_VALUE: 13
PIF_VALUE: 12
PIF_VALUE: 13
PIF_VALUE: 13

## 2025-04-08 NOTE — PROGRESS NOTES
Progress Note:      [facility] NEUROLOGY PROGRESS NOTE          Impression/Recommendations:   Mr. Phani Rossi, 85-year-old male with unknown PMH was found down in home, brought to ED found to have embolic strokes in multiple territories on MRI brain. His exam shows awake individual who is intubated and moves all extremities to touch stimuli and obeys simple command. Pupils are reactive and, is breathing over the ventilator. EF is 40-45%. UTOX is negative.     EEG shows generalized slowing. Patient stable and condition unchanged. No indication for AED.  Patient has embolic stroke and would require a loop recorder to catch Afib on discharge. Volumes of stroke does not appear to be huge overall.  Delirium precautions  Cardiac telemetry  DAPT for 30 days (Aspirin 325 mg qd plus Plavix 75 mg qd) then switch to Plavix 75 mg qd whenever he is stable.  Atorvastatin 40 mg qd  Q4h Neurochecks  Cardiac Telemetry  A1c, Cholesterol Panel  Disposition per PT/OT and primary team whenever appropriate and other medical issues addressed.  Outpatient neurology appointment for follow up.  No further neurological recommendations.  Thank you for the consult.     ?     Ryan Mars MD  Teleneurologist    SUBJECTIVE   Dy Krista Hamm is a 89 y.o. male being evaluated for stroke.     ?     OBJECTIVE   ROS, PMH, FH, SH were all reviewed and are unchanged.   Neurological Examination:   BP (!) 137/46 Comment: primary nurse aware  Pulse 86   Temp 98.6 °F (37 °C) (Axillary)   Resp 22   Ht 1.727 m (5' 8\")   Wt 78.6 kg (173 lb 4.5 oz)   SpO2 100%   BMI 26.35 kg/m²    Assisted by RICARDO Black   His exam shows awake individual who is intubated and moves all extremities to touch stimuli and obeys simple command. Pupils are reactive   ?  Current Facility-Administered Medications   Medication Dose Route Frequency Provider Last Rate Last Admin    dextrose 5 % solution   IntraVENous Continuous Linda Donovan MD 75 mL/hr at 04/08/25 0013

## 2025-04-08 NOTE — CARE COORDINATION
CM reviewed Pt medicals, Pt name: Phani Rossi   1940  #    Pt remains on the vent. As of this time there is no family member available for goals of care.     Per IDR    The doctor has not heard from the Grandson.     Pt remains on the vent.     Pt admitted with stoke.    Pt has been moving, arms and legs are weeping.     Pt tolerates SBT, no sedation    2:00    Pt son, Phani Rossi @ [phone] called Pt Nurse.    Phani Rossi is LNOK and can make decision for Pt.     ALEXANDRE called Phani, he stated that he has not talked with Pt for a year or two.  Phani stated that Pt disappeared and they could not find him.     Phani asked if a  could come and give Pt his last rights.     Phani stated that Pt does not have a daughter.    Phani stated that he will come here and once he gets here have Pt extubated and made comfort care.       4:15    Received a call Pt son, Phani, he stated that he has not seen Pt for 30 years and does want to take on the expense of Pt or the expense of burying Pt.      Phani stated that he will not be claiming the body when Pt passes away.     Cm called Pt friend Hank to let him know that they will be extubating Pt.

## 2025-04-08 NOTE — PROGRESS NOTES
atorvastatin 40 mg; ASA, resume as platelet is better  - Wernicke's encephalopathy unlikely.  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    - Unable to obtain good images on initial TTE   - Repeat TTE is done no evidence of any vegetation reported  - 1 dose of Lasix to  -Try to keep in negative balance    Pulmonary  Acute hypoxic respiratory failure  Pneumomediastinum reported  - Continue lung protective mechanical ventilation.  Daily SAT SBT and wean as tolerated  -Pneumomediastinum reported on one of the x-ray, repeat x-ray was okay  -CT chest reported some left lower lobe lung collapse x-ray is okay, left lung collapse is, within the.  Patient due to poor suctioning    Renal /  MEGHANA, improving  Hypernatremia, improving  Lactic acidosis, improving  UTI  Azotemia vs uremia  - 1/2 NS gtt + FWF via TF for hypernatremia-consider D5 water  - MEGHANA slowly improving with continued hydration  - Monitoring CK  - Nephrology following.  No indication for HD at this time given improving renal function and anticipated improvement of azotemia    GI  Mild hyperammonemia  - consider lactulose if worsening  -Monitor ammonia  - Continue feeding    Endocrine  - s/p stress dose steroids-currently off  - SSI with Accu-Cheks    Heme-onc  Drop in platelet noted, slow improvement noted  Could be related to sepsis  HIT antibodies pending  Started on heparin subcu today  Continue to monitor platelet if further drop consider switching heparin to argatroban or fondaparinux    ID  Gram-positive cocci bacteremia  UTI  Endocarditis, possible  - ceftriaxone + vancomycin-ID evaluated-currently on  levofloxacin 0.5 ug/mL Sensitive      oxacillin 2 ug/mL Resistant      rifampin <=0.5 ug/mL Sensitive  [1]       tetracycline <=1 ug/mL Sensitive      trimethoprim-sulfamethoxazole <=10 ug/mL Sensitive      vancomycin <=0.5 ug/mL Sensitive                   [1]  Rifampin is not to be used for mono-therapy.                    Culture, Blood 2 [[id number]] Collected: 04/02/25 1725    Order Status: Completed Specimen: Blood Updated: 04/06/25 1018     Special Requests No Special Requests        Culture       one of two bottles has been flagged positive by instrument.  Bottle has been sent to [facility] laboratory to assess for possible growth. Gram pos cocci in clusters CALLED TO AND READ BACK BY ENA SILVA AT 1309 ON [id number]/[id number].                  Staphylococcus xylosus growing in both bottles drawn No Site Indicated REFER TO [id number] FOR SENSITIVITIES          Culture, Blood, PCR ID Panel [[id number]]  (Abnormal) Collected: 04/02/25 1725    Order Status: Completed Specimen: Blood Updated: 04/03/25 1835     Accession Number [id number]     Enterococcus faecalis by PCR Not detected     Enterococcus faecium by PCR Not detected     Listeria monocytogenes by PCR Not detected     STAPHYLOCOCCUS Detected        Staphylococcus Aureus Not detected     Staphylococcus epidermidis by PCR Not detected     Staphylococcus lugdunensis by PCR Detected        STREPTOCOCCUS Not detected     Streptococcus agalactiae (Group B) Not detected     Strep pneumoniae Not detected     Strep pyogenes,(Grp. A) Not detected     Acinetobacter calcoac baumannii complex by PCR Not detected     Bacteroides fragilis by PCR Not detected     Enterobacteriaceae by PCR Not detected     Enterobacter cloacae complex by PCR Not detected     Escherichia Coli Not detected     Klebsiella aerogenes by PCR Not detected     Klebsiella oxytoca by PCR Not detected     Klebsiella pneumoniae group by PCR Not detected     Proteus by PCR Not detected     Salmonella species by PCR Not  Negative 04/03/2025    BLOODU Large (A) 04/03/2025    GLUCOSEU Negative 04/03/2025        XR (Most Recent). CXR reviewed by me and compared with previous CXR XR CHEST PORTABLE 04/08/2025    Narrative  EXAM: XR CHEST PORTABLE    HISTORY: intubated.    COMPARISON: 4/7/2025    FINDINGS: Portable AP. The endotracheal tube is in appropriate position. The  nasogastric tube terminates off the edge of the film. Cardiac monitor leads  overlie the chest. There is unchanged hazy opacification of the left lower lung  zone likely due to a layering pleural effusion. No pneumothorax.    Impression  No significant interval change.      Electronically signed by AARON NAJERA        CT (Most Recent) CT MAXILLOFACIAL WO CONTRAST 04/06/2025    Narrative  EXAM: CT MAXILLOFACIAL WO CONTRAST    INDICATION: Facial injury    COMPARISON: None.    CONTRAST:   None.    TECHNIQUE:  Multislice helical CT of the facial bones was performed in the axial  plane without intravenous contrast administration. Coronal and sagittal  reformations were generated.  CT dose reduction was achieved through use of a  standardized protocol tailored for this examination and automatic exposure  control for dose modulation.    FINDINGS:    Bones: There is no fracture or other osseous abnormality    Paranasal sinuses: Clear    Orbits: Unremarkable.    Base of brain: Limited evaluation. No evidence of pathology.    Soft tissues: Within normal limits. No evidence of mass.    Miscellaneous: N/A    Impression  No fracture.    Electronically signed by PRAVEEN MARTIN        EKG Results for orders placed or performed during the hospital encounter of 04/02/25   EKG 12 Lead    Collection Time: 04/02/25  5:05 PM   Result Value Ref Range    Ventricular Rate 95 BPM    Atrial Rate 95 BPM    P-R Interval 138 ms    QRS Duration 78 ms    Q-T Interval 372 ms    QTc Calculation (Bazett) 467 ms    P Axis 81 degrees    R Axis 66 degrees    T Axis 61 degrees    Diagnosis       Possible

## 2025-04-08 NOTE — PROGRESS NOTES
Nephrology follow-up          Patient: Jerel Hamm MRN: [id number]  SSN: [id number]    YOB: 1880  Age: 89 y.o.  Sex: male      Subjective:   The patient is seen at the bedside  On vent/off sedation  Off single pressor support  Resolving MEGHANA  Good urine output  Resolving hypernatremia      I have discussed with the intensivist Dr. Donovan.    No past medical history on file.  No past surgical history on file.   No family history on file.  Social History     Tobacco Use    Smoking status: Former     Types: Cigarettes     Start date: 1970     Passive exposure: Current    Smokeless tobacco: Current   Substance Use Topics    Alcohol use: Defer      Current Facility-Administered Medications   Medication Dose Route Frequency Provider Last Rate Last Admin    fondaparinux (ARIXTRA) injection 2.5 mg  2.5 mg SubCUTAneous Daily Linda Donovan MD        levothyroxine (SYNTHROID) tablet 25 mcg  25 mcg Per NG tube Daily Linda Donovan MD   25 mcg at 04/08/25 1531    psyllium husk-aspartame (METAMUCIL FIBER) packet 1 packet  1 packet Per NG tube BID Linda Donovan MD        lactobacillus (CULTURELLE) capsule 1 capsule  1 capsule Per NG tube BID Linda Donovan MD        [Held by provider] metoprolol tartrate (LOPRESSOR) tablet 25 mg  25 mg Per NG tube BID Maren Doyle MD   25 mg at 04/07/25 2059    hydrALAZINE (APRESOLINE) injection 10 mg  10 mg IntraVENous Q4H PRN Maren Doyle MD        meropenem (MERREM) 1,000 mg in sterile water 20 mL IV syringe  1,000 mg IntraVENous Q12H Sharif Dillard MD   1,000 mg at 04/08/25 1322    mupirocin (BACTROBAN) 2 % ointment   Each Nostril BID Sharif Dillard MD   Given at 04/08/25 0916    aspirin chewable tablet 81 mg  81 mg Oral Daily Linda Donovan MD   81 mg at 04/08/25 0917    atorvastatin (LIPITOR) tablet 40 mg  40 mg Oral Nightly Linda Donovan MD   40 mg at 04/06/25 2018    thiamine mononitrate tablet 100 mg  100 mg Oral Daily Carol Mancilla DO   100 mg

## 2025-04-08 NOTE — PROGRESS NOTES
I again spoke with patient's son Mr. Xie left lower on [phone], he is going to speak with his nephew who is local to find a ride timing, however he would like to do comfort extubation, he also told me that he is not going to claim the body of the patient, I also discussed the patient son that patient is slightly waking up, it is possible that once we extubate we can do like a one-way extubation and continue other measures and if he declines transition him to comfort measures, however patient's son suggested that he would rather proceed with possible comfort extubation and initiate comfort measures order set along with that.  Will respect patient's family wishes, currently patient is DNR and he is going to call us back with the timing.    Additional critical time spent on the patient is 15 minutes excluding procedures      Linda Donovan M.D

## 2025-04-08 NOTE — PLAN OF CARE
Problem: Safety - Adult  Goal: Free from fall injury  Outcome: Progressing     Problem: Neurosensory - Adult  Goal: Achieves stable or improved neurological status  Outcome: Progressing     Problem: Neurosensory - Adult  Goal: Absence of seizures  Outcome: Progressing     Problem: Neurosensory - Adult  Goal: Achieves maximal functionality and self care  Outcome: Progressing     Problem: Respiratory - Adult  Goal: Achieves optimal ventilation and oxygenation  Outcome: Progressing

## 2025-04-08 NOTE — PROGRESS NOTES
09 04 Patient in bed eyes closed bp 85/38 MAP 51 per monitor nurse made aware, respirations even & unlabored at this time

## 2025-04-08 NOTE — PROGRESS NOTES
Progress Note  Date:2025       Room:[facility]  Patient Name:Jerel Hamm     YOB: 1880     Age:89 y.o.        Subjective    Subjective Patient followed for sepsis with bacteremia, probable UTI, facial wound infection and possible LLL pneumonia.  Currently on IV Vanc\omycin and Meropenem.   He remains intubated.   Objective         Vitals Last 24 Hours:  TEMPERATURE:  Temp  Av.9 °F (37.2 °C)  Min: 98.6 °F (37 °C)  Max: 99.5 °F (37.5 °C)  RESPIRATIONS RANGE: Resp  Av.9  Min: 13  Max: 32  PULSE OXIMETRY RANGE: SpO2  Av.5 %  Min: 97 %  Max: 100 %  PULSE RANGE: Pulse  Av.2  Min: 70  Max: 117  BLOOD PRESSURE RANGE: Systolic (24hrs), Av , Min:80 , Max:169   ; Diastolic (24hrs), Av, Min:34, Max:137         Objective  Vitals and nursing note reviewed.   Constitutional:       General: He is in acute distress.      Appearance: He is ill-appearing.   HENT:      Head: Normocephalic and atraumatic.        Comments: Thick eschar right side of face with purulent drainage     Eschar right chin dry     Lesions on right ear with exudate     Right Ear: External ear normal.      Left Ear: External ear normal.      Nose:       Mouth/Throat:      Comments: ET tube, orogastric tube  Eyes:      Pupils: Pupils are equal, round, and reactive to light.   Cardiovascular:      Rate and Rhythm: Normal rate and regular rhythm.      Heart sounds: Normal heart sounds. No murmur heard.  Pulmonary:      Effort: Pulmonary effort is normal.      Breath sounds: Normal breath sounds.   Abdominal:      General: Bowel sounds are normal. There is no distension.      Palpations: Abdomen is soft.      Tenderness: There is no abdominal tenderness.   Genitourinary:     Comments: Indwelling Aguirre catheter  Musculoskeletal:      Cervical back: Neck supple.      Right lower leg: No edema.      Left lower leg: No edema.   Skin:     Findings: No rash.              Comments: Large ecchymotic eschars on both knees, no  and soft tissue infection, right side of face and right ear, culture pending  Acute hypoxic respiratory failure, intubated  Altered mental status with period of unconciousness  7.  Thrombocytopenia, etiology unclear, improving  8.  MRSA nasal colonization     Comment: Temperature trending downward along with WBC. lococcus aureus.  TTE showed no vegetations. Staphylococcus xylosus is a skin colonizer and possibly contaminant in the absence of prosthetic valves or joints. Urine culture probably false negative as he received a load dose of Zosyn more than 24 hours prior to collection of urine culture.  No clear evidence for pneumonia, however, LLL is suspicious.      Plan   1. Continue IV Vancomycin and Merop for now  2.  Follow-up pending blood cultures and wound cultures  3. Awaiting repeat urinalysis  4. Sputum culture  5. In am, repeat CBC, procal, CRP  6. Mupirocin nasal ointment    Electronically signed by Sharif Dillard MD on 4/8/25

## 2025-04-09 LAB
ALBUMIN SERPL-MCNC: 1.8 G/DL (ref 3.5–5)
ALBUMIN SERPL-MCNC: 1.8 G/DL (ref 3.5–5)
ALBUMIN/GLOB SERPL: 0.5 (ref 1.1–2.2)
ALBUMIN/GLOB SERPL: 0.5 (ref 1.1–2.2)
ALP SERPL-CCNC: 131 U/L (ref 45–117)
ALP SERPL-CCNC: 131 U/L (ref 45–117)
ALT SERPL-CCNC: 95 U/L (ref 12–78)
ALT SERPL-CCNC: 95 U/L (ref 12–78)
ANION GAP SERPL CALC-SCNC: 4 MMOL/L (ref 2–12)
ANION GAP SERPL CALC-SCNC: 4 MMOL/L (ref 2–12)
AST SERPL W P-5'-P-CCNC: 100 U/L (ref 15–37)
AST SERPL W P-5'-P-CCNC: 100 U/L (ref 15–37)
BACTERIA SPEC CULT: NORMAL
BACTERIA SPEC CULT: NORMAL
BASOPHILS # BLD: ABNORMAL K/UL
BASOPHILS # BLD: ABNORMAL K/UL
BASOPHILS NFR BLD: ABNORMAL %
BASOPHILS NFR BLD: ABNORMAL %
BILIRUB SERPL-MCNC: 1 MG/DL (ref 0.2–1)
BILIRUB SERPL-MCNC: 1 MG/DL (ref 0.2–1)
BUN SERPL-MCNC: 39 MG/DL (ref 6–20)
BUN SERPL-MCNC: 39 MG/DL (ref 6–20)
BUN/CREAT SERPL: 40 (ref 12–20)
BUN/CREAT SERPL: 40 (ref 12–20)
CA-I BLD-MCNC: 8.3 MG/DL (ref 8.5–10.1)
CA-I BLD-MCNC: 8.3 MG/DL (ref 8.5–10.1)
CHLORIDE SERPL-SCNC: 115 MMOL/L (ref 97–108)
CHLORIDE SERPL-SCNC: 115 MMOL/L (ref 97–108)
CK SERPL-CCNC: 360 U/L (ref 39–308)
CK SERPL-CCNC: 360 U/L (ref 39–308)
CO2 SERPL-SCNC: 25 MMOL/L (ref 21–32)
CO2 SERPL-SCNC: 25 MMOL/L (ref 21–32)
CREAT SERPL-MCNC: 0.97 MG/DL (ref 0.7–1.3)
CREAT SERPL-MCNC: 0.97 MG/DL (ref 0.7–1.3)
CRP SERPL-MCNC: 12 MG/DL (ref 0–0.3)
CRP SERPL-MCNC: 12 MG/DL (ref 0–0.3)
DIFFERENTIAL METHOD BLD: ABNORMAL
DIFFERENTIAL METHOD BLD: ABNORMAL
EOSINOPHIL # BLD: ABNORMAL K/UL
EOSINOPHIL # BLD: ABNORMAL K/UL
EOSINOPHIL NFR BLD: ABNORMAL %
EOSINOPHIL NFR BLD: ABNORMAL %
ERYTHROCYTE [DISTWIDTH] IN BLOOD BY AUTOMATED COUNT: 15.5 % (ref 11.5–14.5)
ERYTHROCYTE [DISTWIDTH] IN BLOOD BY AUTOMATED COUNT: 15.5 % (ref 11.5–14.5)
GLOBULIN SER CALC-MCNC: 3.4 G/DL (ref 2–4)
GLOBULIN SER CALC-MCNC: 3.4 G/DL (ref 2–4)
GLUCOSE BLD STRIP.AUTO-MCNC: 133 MG/DL (ref 65–100)
GLUCOSE BLD STRIP.AUTO-MCNC: 133 MG/DL (ref 65–100)
GLUCOSE SERPL-MCNC: 124 MG/DL (ref 65–100)
GLUCOSE SERPL-MCNC: 124 MG/DL (ref 65–100)
HCT VFR BLD AUTO: 35.5 % (ref 36.6–50.3)
HCT VFR BLD AUTO: 35.5 % (ref 36.6–50.3)
HGB BLD-MCNC: 11.6 G/DL (ref 12.1–17)
HGB BLD-MCNC: 11.6 G/DL (ref 12.1–17)
IMM GRANULOCYTES # BLD AUTO: ABNORMAL K/UL
IMM GRANULOCYTES # BLD AUTO: ABNORMAL K/UL
IMM GRANULOCYTES NFR BLD AUTO: ABNORMAL %
IMM GRANULOCYTES NFR BLD AUTO: ABNORMAL %
LIPASE SERPL-CCNC: 326 U/L (ref 13–75)
LIPASE SERPL-CCNC: 326 U/L (ref 13–75)
LYMPHOCYTES # BLD: ABNORMAL K/UL
LYMPHOCYTES # BLD: ABNORMAL K/UL
LYMPHOCYTES NFR BLD: ABNORMAL %
LYMPHOCYTES NFR BLD: ABNORMAL %
Lab: NORMAL
Lab: NORMAL
MAGNESIUM SERPL-MCNC: 1.9 MG/DL (ref 1.6–2.4)
MAGNESIUM SERPL-MCNC: 1.9 MG/DL (ref 1.6–2.4)
MCH RBC QN AUTO: 31.5 PG (ref 26–34)
MCH RBC QN AUTO: 31.5 PG (ref 26–34)
MCHC RBC AUTO-ENTMCNC: 32.7 G/DL (ref 30–36.5)
MCHC RBC AUTO-ENTMCNC: 32.7 G/DL (ref 30–36.5)
MCV RBC AUTO: 96.5 FL (ref 80–99)
MCV RBC AUTO: 96.5 FL (ref 80–99)
MONOCYTES # BLD: ABNORMAL K/UL
MONOCYTES # BLD: ABNORMAL K/UL
MONOCYTES NFR BLD: ABNORMAL %
MONOCYTES NFR BLD: ABNORMAL %
NEUTS SEG # BLD: ABNORMAL K/UL
NEUTS SEG # BLD: ABNORMAL K/UL
NEUTS SEG NFR BLD: ABNORMAL %
NEUTS SEG NFR BLD: ABNORMAL %
NRBC # BLD: 0.04 K/UL (ref 0–0.01)
NRBC # BLD: 0.04 K/UL (ref 0–0.01)
NRBC BLD-RTO: 0.2 PER 100 WBC
NRBC BLD-RTO: 0.2 PER 100 WBC
PERFORMED BY:: ABNORMAL
PERFORMED BY:: ABNORMAL
PHOSPHATE SERPL-MCNC: 2.3 MG/DL (ref 2.6–4.7)
PHOSPHATE SERPL-MCNC: 2.3 MG/DL (ref 2.6–4.7)
PLATELET # BLD AUTO: 181 K/UL (ref 150–400)
PLATELET # BLD AUTO: 181 K/UL (ref 150–400)
PMV BLD AUTO: 12.5 FL (ref 8.9–12.9)
PMV BLD AUTO: 12.5 FL (ref 8.9–12.9)
POTASSIUM SERPL-SCNC: 3.7 MMOL/L (ref 3.5–5.1)
POTASSIUM SERPL-SCNC: 3.7 MMOL/L (ref 3.5–5.1)
PROT SERPL-MCNC: 5.2 G/DL (ref 6.4–8.2)
PROT SERPL-MCNC: 5.2 G/DL (ref 6.4–8.2)
RBC # BLD AUTO: 3.68 M/UL (ref 4.1–5.7)
RBC # BLD AUTO: 3.68 M/UL (ref 4.1–5.7)
SODIUM SERPL-SCNC: 144 MMOL/L (ref 136–145)
SODIUM SERPL-SCNC: 144 MMOL/L (ref 136–145)
WBC # BLD AUTO: 19.7 K/UL (ref 4.1–11.1)
WBC # BLD AUTO: 19.7 K/UL (ref 4.1–11.1)

## 2025-04-09 PROCEDURE — 6360000002 HC RX W HCPCS: Performed by: INTERNAL MEDICINE

## 2025-04-09 PROCEDURE — 2500000003 HC RX 250 WO HCPCS: Performed by: INTERNAL MEDICINE

## 2025-04-09 PROCEDURE — 6360000002 HC RX W HCPCS

## 2025-04-09 PROCEDURE — 6370000000 HC RX 637 (ALT 250 FOR IP): Performed by: INTERNAL MEDICINE

## 2025-04-09 PROCEDURE — 2000000000 HC ICU R&B

## 2025-04-09 PROCEDURE — 2580000003 HC RX 258: Performed by: INTERNAL MEDICINE

## 2025-04-09 PROCEDURE — 99233 SBSQ HOSP IP/OBS HIGH 50: CPT | Performed by: INTERNAL MEDICINE

## 2025-04-09 PROCEDURE — 6370000000 HC RX 637 (ALT 250 FOR IP): Performed by: STUDENT IN AN ORGANIZED HEALTH CARE EDUCATION/TRAINING PROGRAM

## 2025-04-09 PROCEDURE — 94003 VENT MGMT INPAT SUBQ DAY: CPT

## 2025-04-09 RX ORDER — DEXTROSE MONOHYDRATE AND SODIUM CHLORIDE 5; .45 G/100ML; G/100ML
INJECTION, SOLUTION INTRAVENOUS CONTINUOUS
Status: DISCONTINUED | OUTPATIENT
Start: 2025-04-09 | End: 2025-04-11 | Stop reason: HOSPADM

## 2025-04-09 RX ORDER — MIDAZOLAM HYDROCHLORIDE 2 MG/2ML
2 INJECTION, SOLUTION INTRAMUSCULAR; INTRAVENOUS
Status: DISCONTINUED | OUTPATIENT
Start: 2025-04-09 | End: 2025-04-11 | Stop reason: HOSPADM

## 2025-04-09 RX ORDER — ENOXAPARIN SODIUM 100 MG/ML
40 INJECTION SUBCUTANEOUS DAILY
Status: DISCONTINUED | OUTPATIENT
Start: 2025-04-09 | End: 2025-04-09

## 2025-04-09 RX ORDER — MORPHINE SULFATE 2 MG/ML
2 INJECTION, SOLUTION INTRAMUSCULAR; INTRAVENOUS
Refills: 0 | Status: DISCONTINUED | OUTPATIENT
Start: 2025-04-09 | End: 2025-04-11 | Stop reason: HOSPADM

## 2025-04-09 RX ORDER — ACETAMINOPHEN 325 MG/1
650 TABLET ORAL EVERY 4 HOURS PRN
Status: DISCONTINUED | OUTPATIENT
Start: 2025-04-09 | End: 2025-04-11

## 2025-04-09 RX ORDER — MORPHINE SULFATE 4 MG/ML
4 INJECTION, SOLUTION INTRAMUSCULAR; INTRAVENOUS
Refills: 0 | Status: DISCONTINUED | OUTPATIENT
Start: 2025-04-09 | End: 2025-04-11 | Stop reason: HOSPADM

## 2025-04-09 RX ORDER — MIDAZOLAM HYDROCHLORIDE 1 MG/ML
INJECTION, SOLUTION INTRAMUSCULAR; INTRAVENOUS
Status: COMPLETED
Start: 2025-04-09 | End: 2025-04-09

## 2025-04-09 RX ORDER — MORPHINE SULFATE 2 MG/ML
INJECTION, SOLUTION INTRAMUSCULAR; INTRAVENOUS
Status: COMPLETED
Start: 2025-04-09 | End: 2025-04-09

## 2025-04-09 RX ORDER — FUROSEMIDE 10 MG/ML
20 INJECTION INTRAMUSCULAR; INTRAVENOUS ONCE
Status: COMPLETED | OUTPATIENT
Start: 2025-04-09 | End: 2025-04-09

## 2025-04-09 RX ADMIN — FUROSEMIDE 20 MG: 10 INJECTION, SOLUTION INTRAMUSCULAR; INTRAVENOUS at 14:01

## 2025-04-09 RX ADMIN — DEXTROSE AND SODIUM CHLORIDE: 5; 450 INJECTION, SOLUTION INTRAVENOUS at 16:47

## 2025-04-09 RX ADMIN — THIAMINE HCL TAB 100 MG 100 MG: 100 TAB at 08:37

## 2025-04-09 RX ADMIN — LEVOTHYROXINE SODIUM 25 MCG: 0.03 TABLET ORAL at 05:39

## 2025-04-09 RX ADMIN — MORPHINE SULFATE 2 MG: 2 INJECTION, SOLUTION INTRAMUSCULAR; INTRAVENOUS at 16:24

## 2025-04-09 RX ADMIN — MEROPENEM 1000 MG: 1 INJECTION INTRAVENOUS at 12:56

## 2025-04-09 RX ADMIN — ENOXAPARIN SODIUM 40 MG: 100 INJECTION SUBCUTANEOUS at 12:57

## 2025-04-09 RX ADMIN — Medication 8 MCG/MIN: at 14:04

## 2025-04-09 RX ADMIN — SODIUM CHLORIDE, PRESERVATIVE FREE 20 MG: 5 INJECTION INTRAVENOUS at 08:37

## 2025-04-09 RX ADMIN — Medication 1 CAPSULE: at 08:41

## 2025-04-09 RX ADMIN — ASPIRIN 81 MG: 81 TABLET, CHEWABLE ORAL at 08:37

## 2025-04-09 RX ADMIN — MORPHINE SULFATE 2 MG: 2 INJECTION, SOLUTION INTRAMUSCULAR; INTRAVENOUS at 22:42

## 2025-04-09 RX ADMIN — MIDAZOLAM 2 MG: 1 INJECTION INTRAMUSCULAR; INTRAVENOUS at 16:25

## 2025-04-09 RX ADMIN — MEROPENEM 1000 MG: 1 INJECTION INTRAVENOUS at 03:08

## 2025-04-09 RX ADMIN — Medication: at 08:52

## 2025-04-09 RX ADMIN — MUPIROCIN: 20 OINTMENT TOPICAL at 08:44

## 2025-04-09 RX ADMIN — PSYLLIUM HUSK 1 PACKET: 3.4 POWDER ORAL at 08:42

## 2025-04-09 RX ADMIN — MIDAZOLAM 2 MG: 1 INJECTION INTRAMUSCULAR; INTRAVENOUS at 23:11

## 2025-04-09 ASSESSMENT — PULMONARY FUNCTION TESTS
PIF_VALUE: 13
PIF_VALUE: 12
PIF_VALUE: 12
PIF_VALUE: 13
PIF_VALUE: 12
PIF_VALUE: 13
PIF_VALUE: 12
PIF_VALUE: 13
PIF_VALUE: 12
PIF_VALUE: 13
PIF_VALUE: 12
PIF_VALUE: 13
PIF_VALUE: 12
PIF_VALUE: 13
PIF_VALUE: 12
PIF_VALUE: 13
PIF_VALUE: 12
PIF_VALUE: 13
PIF_VALUE: 13
PIF_VALUE: 12
PIF_VALUE: 13
PIF_VALUE: 12

## 2025-04-09 ASSESSMENT — PAIN SCALES - GENERAL: PAINLEVEL_OUTOF10: 0

## 2025-04-09 NOTE — PLAN OF CARE
Problem: Safety - Adult  Goal: Free from fall injury  Outcome: Progressing  Flowsheets (Taken 4/9/2025 1422)  Free From Fall Injury: Instruct family/caregiver on patient safety  Note: Patient unable retain education , no family present      Problem: Discharge Planning  Goal: Discharge to home or other facility with appropriate resources  Outcome: Progressing  Flowsheets (Taken 4/9/2025 1422)  Discharge to home or other facility with appropriate resources:   Arrange for needed discharge resources and transportation as appropriate   Identify barriers to discharge with patient and caregiver     Problem: Neurosensory - Adult  Goal: Achieves stable or improved neurological status  Outcome: Not Progressing  Flowsheets (Taken 4/9/2025 1422)  Achieves stable or improved neurological status:   Assess for and report changes in neurological status   Maintain blood pressure and fluid volume within ordered parameters to optimize cerebral perfusion and minimize risk of hemorrhage   Monitor temperature, glucose, and sodium. Initiate appropriate interventions as ordered     Problem: Cardiovascular - Adult  Goal: Maintains optimal cardiac output and hemodynamic stability  Outcome: Progressing  Flowsheets (Taken 4/9/2025 1422)  Maintains optimal cardiac output and hemodynamic stability:   Monitor blood pressure and heart rate   Monitor urine output and notify Licensed Independent Practitioner for values outside of normal range   Administer vasoactive medications as ordered     Problem: Cardiovascular - Adult  Goal: Absence of cardiac dysrhythmias or at baseline  Outcome: Progressing  Flowsheets (Taken 4/9/2025 1422)  Absence of cardiac dysrhythmias or at baseline: Monitor cardiac rate and rhythm     Problem: Skin/Tissue Integrity - Adult  Goal: Skin integrity remains intact  Description: 1.  Monitor for areas of redness and/or skin breakdown  2.  Assess vascular access sites hourly  3.  Every 4-6 hours minimum:  Change oxygen  saturation probe site  4.  Every 4-6 hours:  If on nasal continuous positive airway pressure, respiratory therapy assess nares and determine need for appliance change or resting period  Outcome: Progressing  Flowsheets (Taken 4/9/2025 1422)  Skin Integrity Remains Intact: Monitor for areas of redness and/or skin breakdown     Problem: Musculoskeletal - Adult  Goal: Return mobility to safest level of function  Outcome: Not Progressing  Flowsheets (Taken 4/9/2025 1422)  Return Mobility to Safest Level of Function: Assess patient stability and activity tolerance for standing, transferring and ambulating with or without assistive devices     Problem: Metabolic/Fluid and Electrolytes - Adult  Goal: Electrolytes maintained within normal limits  Outcome: Progressing  Flowsheets (Taken 4/9/2025 1422)  Electrolytes maintained within normal limits: Monitor labs and assess patient for signs and symptoms of electrolyte imbalances     Problem: Metabolic/Fluid and Electrolytes - Adult  Goal: Glucose maintained within prescribed range  Outcome: Progressing  Flowsheets (Taken 4/9/2025 1422)  Glucose maintained within prescribed range:   Monitor blood glucose as ordered   Assess barriers to adequate nutritional intake and initiate nutrition consult as needed     Problem: Pain  Goal: Verbalizes/displays adequate comfort level or baseline comfort level  Outcome: Progressing  Flowsheets  Taken 4/9/2025 1422 by Haley Khoury RN  Verbalizes/displays adequate comfort level or baseline comfort level:   Encourage patient to monitor pain and request assistance   Assess pain using appropriate pain scale   Administer analgesics based on type and severity of pain and evaluate response  Taken 4/9/2025 0400 by Maria Del Carmen Pereira RN  Verbalizes/displays adequate comfort level or baseline comfort level:   Assess pain using appropriate pain scale   Administer analgesics based on type and severity of pain and evaluate response     Problem:

## 2025-04-09 NOTE — CARE COORDINATION
CM reviewed Pt medicals, it appears that they will be doing terminal extubation.     Per doctor, waiting for Pt son to call back to let the doctor know when he wants Pt extubated and made comfort care.     Per IDR  Pt not waking up, off sedation, tolerating tube feeds.     [facility] following.

## 2025-04-09 NOTE — PROGRESS NOTES
Spoke with patient's son Forrest Rossi [phone] and he suggested that he would like to transition the patient to comfort measures only.    They wish for him to be comfortable however they also wish to have some sort of glucose administered due to Oriental orthodox purposes and do not want it to to start for him to death    Will place comfort measures order set  Will continue D5 half-normal saline at 50 cc/h as per family's request  Plan of care discussed with the nursing  DC all medications  DC all labs  If does well can be transferred out of ICU      Linda Donovan M.D      Patient is comfort measures only  Trying to transfer out of the ICU  Have left a message for hospitalist team at 5:00 and 5:30 PM awaiting callback  Once accepted by hospitalist team will be transfer orders      Linda Donovan M.D

## 2025-04-09 NOTE — PLAN OF CARE
Problem: Safety - Adult  Goal: Free from fall injury  Outcome: Progressing     Problem: Discharge Planning  Goal: Discharge to home or other facility with appropriate resources  Outcome: Progressing     Problem: Neurosensory - Adult  Goal: Achieves stable or improved neurological status  Outcome: Progressing  Goal: Absence of seizures  Outcome: Progressing  Goal: Remains free of injury related to seizures activity  Outcome: Progressing  Goal: Achieves maximal functionality and self care  Outcome: Progressing     Problem: Respiratory - Adult  Goal: Achieves optimal ventilation and oxygenation  Outcome: Progressing     Problem: Cardiovascular - Adult  Goal: Maintains optimal cardiac output and hemodynamic stability  Outcome: Progressing  Goal: Absence of cardiac dysrhythmias or at baseline  Outcome: Progressing     Problem: Skin/Tissue Integrity - Adult  Goal: Skin integrity remains intact  Description: 1.  Monitor for areas of redness and/or skin breakdown  2.  Assess vascular access sites hourly  3.  Every 4-6 hours minimum:  Change oxygen saturation probe site  4.  Every 4-6 hours:  If on nasal continuous positive airway pressure, respiratory therapy assess nares and determine need for appliance change or resting period  Outcome: Progressing  Goal: Incisions, wounds, or drain sites healing without S/S of infection  Outcome: Progressing  Goal: Oral mucous membranes remain intact  Outcome: Progressing     Problem: Musculoskeletal - Adult  Goal: Return mobility to safest level of function  Outcome: Progressing  Goal: Maintain proper alignment of affected body part  Outcome: Progressing  Goal: Return ADL status to a safe level of function  Outcome: Progressing     Problem: Infection - Adult  Goal: Absence of infection at discharge  Outcome: Progressing  Goal: Absence of infection during hospitalization  Outcome: Progressing  Goal: Absence of fever/infection during anticipated neutropenic period  Outcome:

## 2025-04-09 NOTE — PROGRESS NOTES
Nephrology follow-up          Patient: Jerel Hamm MRN: [id number]  SSN: [id number]    YOB: 1880  Age: 89 y.o.  Sex: male      Subjective:   The patient is seen at the bedside  On vent/off sedation  Off single pressor support  Resolving MEGHANA  Good urine output  Resolving hypernatremia          No past medical history on file.  No past surgical history on file.   No family history on file.  Social History     Tobacco Use    Smoking status: Former     Types: Cigarettes     Start date: 1970     Passive exposure: Current    Smokeless tobacco: Current   Substance Use Topics    Alcohol use: Defer      Current Facility-Administered Medications   Medication Dose Route Frequency Provider Last Rate Last Admin    acetaminophen (TYLENOL) tablet 650 mg  650 mg Oral Q4H PRN Viji Hernandez APRN - CNP        enoxaparin (LOVENOX) injection 40 mg  40 mg SubCUTAneous Daily Linda Donovan MD   40 mg at 04/09/25 1257    levothyroxine (SYNTHROID) tablet 25 mcg  25 mcg Per NG tube Daily Linda Donovan MD   25 mcg at 04/09/25 0539    psyllium husk-aspartame (METAMUCIL FIBER) packet 1 packet  1 packet Per NG tube BID Linda Donovan MD   1 packet at 04/09/25 0842    lactobacillus (CULTURELLE) capsule 1 capsule  1 capsule Per NG tube BID Linda Donovan MD   1 capsule at 04/09/25 0841    [Held by provider] metoprolol tartrate (LOPRESSOR) tablet 25 mg  25 mg Per NG tube BID Maren Doyle MD   25 mg at 04/07/25 2059    hydrALAZINE (APRESOLINE) injection 10 mg  10 mg IntraVENous Q4H PRN Maren Doyle MD        meropenem (MERREM) 1,000 mg in sterile water 20 mL IV syringe  1,000 mg IntraVENous Q12H Sharif Dillard MD   1,000 mg at 04/09/25 1256    mupirocin (BACTROBAN) 2 % ointment   Each Nostril BID Sharif Dillard MD   Given at 04/09/25 0844    aspirin chewable tablet 81 mg  81 mg Oral Daily Linda Donovan MD   81 mg at 04/09/25 0837    atorvastatin (LIPITOR) tablet 40 mg  40 mg Oral Nightly Linda Donovan  scherer+  Lower Extremities: no edema  Skin: Multiple skin wounds  Neuro: Unresponsive        Assessment/Plan:   Acute kidney injury  2/2 prerenal azotemia from volume depletion/hypotension/sepsis  On admission BUN/creatinine 152/4.4  Resolving MEGHANA, BUN/creatinine 37/0.91 today.  No idea about baseline kidney function  Clinically volume down  UA positive for UTI  Scherer in place and good urine output.  Total CPK is mildly elevated  Renal ultrasound no hydronephrosis.  off IV hydration and on water flushes via NG    Hypernatremia  Free water deficit  Sodium 149->144->147->149->144  Can discontinue hypotonic IV fluids  On water flushes via NG    Severe sepsis/shock  Urinary tract infection  Bacteremia  Off single pressor support  On IV antibiotics  Blood culture Staph aureus    Acute encephalopathy  Metabolic  CT head no acute process  Unlikely uremia  Unresponsive  Intensivist discussing goals of care with son    Anemia  Hemoglobin 9.9  I will sign iron studies and ferritin level.    DVT prophylaxis  Agree with unfractioned subcu heparin            Plan:       Signed By: Maren Doyle MD     April 9, 2025

## 2025-04-09 NOTE — PROGRESS NOTES
Hospitalist Progress Note               Daily Progress Note: 4/9/2025      Hospital Day: 8     Chief complaint:   Chief Complaint   Patient presents with    Unresponsive        Subjective:   Hospital course to date:    ICU specialist Dr. Donovan Spoke with patient's son Forrest Rossi [phone] and he suggested that he would like to transition the patient to comfort measures only.  They wish for him to be comfortable however they also wish to have some sort of glucose administered due to Gnosticist purposes and do not want it to to start for him to death     Dr. Donovan placed comfort measures order set  Will continue D5 half-normal saline at 50 cc/h as per family's request  Plan of care discussed with the nursing  DC all medications  DC all labs  Patient transferred out of ICU to floor service  Patient is comfort measures only        Tamera Levine MD

## 2025-04-09 NOTE — PROGRESS NOTES
[facility] ICU Progress Note        Jerel Velasco Xxhavana  1/1/1880  [id number]  4/9/2025      Brief HPI / Hospital Course:  Patient's real name is Mr. Phani Rossi, 85-year-old male.  Patient had been found unresponsive in his apartment (unsure why emergency providers were called to his apartment) and brought in to [facility] ED.  Patient had been intubated for respiratory failure and encephalopathy.  Unsure of downtime.  Admitted to ICU thereafter for further evaluation management.  Due to circulatory shock pressors and stress with steroids initiated.    4/3: Clarified patient identity.  Otherwise remains encephalopathic and critically ill.  Of note, blood cultures positive for gram-positive cocci on preliminary results.  4/4: MRI pending. Remains encephalopathic during SAT, but tolerating SBT  4/5: MRI demonstrating scattered strokes in multiple vascular territories consistent with strokes of embolic phenomenon.  Will attempt repeat TTE.  Otherwise continues to tolerate SBT but remains encephalopathic during SAT.  4/6: Echo showed EF of 40 to 42%, no thrombus was noted, overnight had asynchrony with the vent and switch to AC PC, creatinine has improved to 1.50, platelet has dropped to 58, blood culture was staph  Pulmonary's and MRSA screen positive, chest x-ray today morning reported pneumomediastinum, patient has significant facial bone wound noted.  Currently on propofol of 20, half-normal saline at 125 cc/h  4/7: Currently on CPAP doing well however mentation is not improved, status post CT chest abdomen pelvis did not show any pneumomediastinum, facial CT did not show any bony involvement, repeat echo showed improvement in EF to 50%  4/8/2025: No major overnight events, still on pressure support of 6 and PEEP of 5, slowly opening his eyes and looking at us, spoke with patient's son Mr. Giorgio barton at [phone], discussed patient's plan of care and transition to DNR, ID evaluated change antibiotics

## 2025-04-09 NOTE — PROGRESS NOTES
Comprehensive Nutrition Assessment    Type and Reason for Visit:  Reassess (Early Follow Up)    Nutrition Recommendations/Plan:   Modify TF via OGT w/ Promote to new goal rate of 55 ml/hr+1Prosource TID. Administer 300ml free water every 3 hours per MD.  TF @ goal provides: 1320 kcals, 84 g pro, and 1097 ml free water  Prosource x3: +120kcals. +33g pro  Rodolfo BID: +180 kcals, +5 g pro  TOTAL: 1620 kcals (22 kcals/kg/115%PSU), 122g pro (1.7 g/kg), and 3497 ml H2O+IVF  Monitor lytes closely and correct as needed  Monitor weight, fluid status, labs, and bowel fxn/abd exam - recent hx loose stools, metamucil+lactobacillus added y'day  4.   Monitor BG levels w/ target range 140-180     Malnutrition Assessment:  Malnutrition Status:  At risk for malnutrition (04/03/25 1250)      Nutrition Assessment:    Presented via EMS after being found down x2-3 days. Admitted to ICU w/ multi-organ failure, intubated on levo. Pt undergoing cerebell. More awake, likely to require sedation. Per MDR discussion, plasn to place OGT and start TF. (4/7) Pt remains in ICU on vent w/ TF @ goal. MRI 4/5 w/ multiple strokes, pt w/ continued poor mentation, tolerating SBT well today. Blood cxr +staph, MRSA screen+. Labs: Na 149, BUN 60, , ALT 83, Phos 2.1, BG 100s-110s. Meds: merrem, glycolax, thiamine, D5 @75ml/hr. Off levo since 4/5, off prop since 4/6.  (4/9) Pt reamins on vent in ICU, TF @ goal. Noted to be having multiple loose stools per day, lactobacillus+metamucil added y'day. Ongoing discussion w/ family regarding GOC. Wt down slightly w/ diuresis. Labs: BG 100s-130s, Lipase 326, BUN 39, Phos 2.3, CRP 12 Meds: lasix, merrem, glycolax (held x3 days), thiamine, levo @ 8    Nutrition Related Findings:    Signs of mild-mod muscle wasting which may be age-related vs nutr status. No reported n/v/d/c, BM 4/9. Improved pitting/weeping gen/BUE, pitting BLE edema, +13L per I/Os. Wound Type: Multiple, Pressure Injury, Unstageable, Deep Tissue  Status or Edema    Discharge Planning:    Too soon to determine     Viji Louis RD  Contact: [phone]

## 2025-04-09 NOTE — PROGRESS NOTES
Spoke with patient's son, son is still waiting for nephew to come and see him before he makes a final decision    He suggested he does not wish for his father to have any lines done right now as he surely things that he will transition to comfort measures in the next day or 2    I will ask nursing team if they can get a new peripheral line      Linda Donovan M.D

## 2025-04-10 PROCEDURE — 2580000003 HC RX 258: Performed by: INTERNAL MEDICINE

## 2025-04-10 PROCEDURE — 1100000000 HC RM PRIVATE

## 2025-04-10 PROCEDURE — 6360000002 HC RX W HCPCS: Performed by: INTERNAL MEDICINE

## 2025-04-10 RX ORDER — GLYCOPYRROLATE 0.2 MG/ML
0.1 INJECTION INTRAMUSCULAR; INTRAVENOUS EVERY 4 HOURS PRN
Status: DISCONTINUED | OUTPATIENT
Start: 2025-04-10 | End: 2025-04-11 | Stop reason: HOSPADM

## 2025-04-10 RX ADMIN — MIDAZOLAM 2 MG: 1 INJECTION INTRAMUSCULAR; INTRAVENOUS at 11:07

## 2025-04-10 RX ADMIN — MIDAZOLAM 2 MG: 1 INJECTION INTRAMUSCULAR; INTRAVENOUS at 18:08

## 2025-04-10 RX ADMIN — DEXTROSE AND SODIUM CHLORIDE: 5; 450 INJECTION, SOLUTION INTRAVENOUS at 04:21

## 2025-04-10 RX ADMIN — MORPHINE SULFATE 2 MG: 2 INJECTION, SOLUTION INTRAMUSCULAR; INTRAVENOUS at 04:17

## 2025-04-10 RX ADMIN — MORPHINE SULFATE 2 MG: 2 INJECTION, SOLUTION INTRAMUSCULAR; INTRAVENOUS at 20:15

## 2025-04-10 RX ADMIN — MORPHINE SULFATE 2 MG: 2 INJECTION, SOLUTION INTRAMUSCULAR; INTRAVENOUS at 11:07

## 2025-04-10 ASSESSMENT — PAIN SCALES - GENERAL
PAINLEVEL_OUTOF10: 3
PAINLEVEL_OUTOF10: 0

## 2025-04-10 ASSESSMENT — PAIN SCALES - WONG BAKER
WONGBAKER_NUMERICALRESPONSE: NO HURT

## 2025-04-10 NOTE — PROGRESS NOTES
Hospitalist Progress Note               Daily Progress Note: 4/10/2025      Hospital Day: 9     Chief complaint:   Chief Complaint   Patient presents with    Unresponsive        Subjective:   Hospital course to date:    ICU specialist Dr. Donovan Spoke with patient's son Forrest Rossi [phone] and he suggested that he would like to transition the patient to comfort measures only.  They wish for him to be comfortable however they also wish to have some sort of glucose administered due to Episcopalian purposes and do not want it to to start for him to death     Dr. Donovan placed comfort measures order set  Will continue D5 half-normal saline at 50 cc/h as per family's request  Plan of care discussed with the nursing  DC all medications  DC all labs  Patient transferred out of ICU to floor service  Patient is comfort measures only        Tamera Levine MD

## 2025-04-10 NOTE — PROGRESS NOTES
Progress Note  Date:4/10/2025       Room:[facility] [place]  Patient Name:Jerel Hamm     YOB: 1880     Age:89 y.o.        Subjective    Subjective Patient followed for sepsis with bacteremia, probable UTI, facial wound infection and possible LLL pneumonia.  Currently on IV Vanc\omycin and Meropenem.   He remains intubated. PATIENT NOW ON COMFORT CARE.  Objective         Vitals Last 24 Hours:  TEMPERATURE:  Temp  Av.9 °F (36.6 °C)  Min: 97.1 °F (36.2 °C)  Max: 98.7 °F (37.1 °C)  RESPIRATIONS RANGE: Resp  Av.1  Min: 13  Max: 29  PULSE OXIMETRY RANGE: SpO2  Av.3 %  Min: 93 %  Max: 100 %  PULSE RANGE: Pulse  Av.1  Min: 78  Max: 117  BLOOD PRESSURE RANGE: Systolic (24hrs), Av , Min:86 , Max:167   ; Diastolic (24hrs), Av, Min:41, Max:116         Objective:  Vital signs: (most recent): Blood pressure 121/67, pulse 90, temperature 98.1 °F (36.7 °C), temperature source Axillary, resp. rate 17, height 1.727 m (5' 8\"), weight 77 kg (169 lb 12.1 oz), SpO2 93%.      Vitals and nursing note reviewed.   Constitutional:       General: He is in acute distress.      Appearance: He is ill-appearing.   HENT:      Head: Normocephalic and atraumatic.        Comments: Thick eschar right side of face with purulent drainage     Eschar right chin dry     Lesions on right ear with exudate     Right Ear: External ear normal.      Left Ear: External ear normal.      Nose:       Mouth/Throat:      Comments: ET tube, orogastric tube  Eyes:      Pupils: Pupils are equal, round, and reactive to light.   Cardiovascular:      Rate and Rhythm: Normal rate and regular rhythm.      Heart sounds: Normal heart sounds. No murmur heard.  Pulmonary:      Effort: Pulmonary effort is normal.      Breath sounds: Normal breath sounds.   Abdominal:      General: Bowel sounds are normal. There is no distension.      Palpations: Abdomen is soft.      Tenderness: There is no abdominal tenderness.   Genitourinary:     Comments:

## 2025-04-10 NOTE — PLAN OF CARE
Problem: Confusion  Goal: Confusion, delirium, dementia, or psychosis is improved or at baseline  Description: INTERVENTIONS:  1. Assess for possible contributors to thought disturbance, including medications, impaired vision or hearing, underlying metabolic abnormalities, dehydration, psychiatric diagnoses, and notify attending LIP  2. [place] high risk fall precautions, as indicated  3. Provide frequent short contacts to provide reality reorientation, refocusing and direction  4. Decrease environmental stimuli, including noise as appropriate  5. Monitor and intervene to maintain adequate nutrition, hydration, elimination, sleep and activity  6. If unable to ensure safety without constant attention obtain sitter and review sitter guidelines with assigned personnel  7. Initiate Psychosocial CNS and Spiritual Care consult, as indicated  Outcome: Progressing  Flowsheets (Taken 4/10/2025 0443)  Effect of thought disturbance (confusion, delirium, dementia, or psychosis) are managed with adequate functional status:   [place] high risk fall precautions, as indicated   Decrease environmental stimuli, including noise as appropriate   Monitor and intervene to maintain adequate nutrition, hydration, elimination, sleep and activity     Problem: Safety - Adult  Goal: Free from fall injury  4/9/2025 2106 by SARTHAK Sherwood RN  Outcome: Progressing  4/9/2025 2106 by SARTHAK Sherwood RN  Outcome: Not Progressing     Problem: Discharge Planning  Goal: Discharge to home or other facility with appropriate resources  4/9/2025 2106 by SARTHAK Sherwood RN  Outcome: Progressing  4/9/2025 2106 by SARTHAK Sherwood RN  Outcome: Not Progressing     Problem: Neurosensory - Adult  Goal: Achieves stable or improved neurological status  4/9/2025 2106 by SARTHAK Sherwood RN  Outcome: Progressing  4/9/2025 2106 by SARTHAK Sherwood RN  Outcome: Not Progressing  Goal: Absence of seizures  4/9/2025 2106 by SARTHAK Sherwood

## 2025-04-10 NOTE — FLOWSHEET NOTE
04/10/25 1942   Treatment Team Notification   Reason for Communication Critical results   Type of Critical Result Laboratory   Critical Lab Information MRSA wound cx pos   Person Result Received From Laura SILVA   Critical Lab Result Type Other (comment)  (wound cx)   Name of Team Member Notified Lino FLORIAN   Treatment Team Role Physician Assistant   Method of Communication Secure Message   Response No new orders   Notification Time 1942

## 2025-04-10 NOTE — CARE COORDINATION
DCP: comfort care  GLORIA: 48 hours    CM attempted to contact pt son listed in chart,  Phani Rossi [phone] to get choice for hospice agency, but there was no answer. CM left  requesting return call.     CM team will continue following.

## 2025-04-10 NOTE — PLAN OF CARE
Problem: Skin/Tissue Integrity  Goal: Skin integrity remains intact  Description: 1.  Monitor for areas of redness and/or skin breakdown  2.  Assess vascular access sites hourly  3.  Every 4-6 hours minimum:  Change oxygen saturation probe site  4.  Every 4-6 hours:  If on nasal continuous positive airway pressure, respiratory therapy assess nares and determine need for appliance change or resting period  4/9/2025 2106 by SARTHAK Sherwood RN  Outcome: Progressing  4/9/2025 2106 by SARTHAK Sherwood RN  Outcome: Not Progressing  4/9/2025 1422 by Haley Khoury RN  Outcome: Progressing  Flowsheets (Taken 4/9/2025 1422)  Skin Integrity Remains Intact: Monitor for areas of redness and/or skin breakdown     Problem: Safety - Adult  Goal: Free from fall injury  4/9/2025 2106 by SARTHAK Sherwood RN  Outcome: Progressing  4/9/2025 2106 by SARTHAK Sherwood RN  Outcome: Not Progressing  4/9/2025 1422 by Haley Khoury RN  Outcome: Progressing  Flowsheets (Taken 4/9/2025 1422)  Free From Fall Injury: Instruct family/caregiver on patient safety  Note: Patient unable retain education , no family present       Goal: Incisions, wounds, or drain sites healing without S/S of infection  4/9/2025 2106 by SARTHAK Sherwood RN  Outcome: Progressing  4/9/2025 2106 by SARTHAK Sherwood RN  Outcome: Not Progressing  Goal: Oral mucous membranes remain intact  4/9/2025 2106 by SARTHAK Sherwood RN  Outcome: Progressing  4/9/2025 2106 by SARTHAK Sherwood RN  Outcome: Not Progressing     Problem: Musculoskeletal - Adult  Goal: Return mobility to safest level of function  4/9/2025 2106 by SARTHAK Sherwood RN  Outcome: Progressing  4/9/2025 2106 by SARTHAK Sherwood RN  Outcome: Not Progressing  4/9/2025 1422 by Haley Khoury RN  Outcome: Not Progressing  Flowsheets (Taken 4/9/2025 1422)  Return Mobility to Safest Level of Function: Assess patient stability and activity tolerance for standing, transferring and ambulating

## 2025-04-11 VITALS
WEIGHT: 169.75 LBS | HEIGHT: 68 IN | SYSTOLIC BLOOD PRESSURE: 109 MMHG | TEMPERATURE: 97.4 F | OXYGEN SATURATION: 99 % | BODY MASS INDEX: 25.73 KG/M2 | BODY MASS INDEX: 25.73 KG/M2 | SYSTOLIC BLOOD PRESSURE: 109 MMHG | OXYGEN SATURATION: 99 % | TEMPERATURE: 97.4 F | DIASTOLIC BLOOD PRESSURE: 66 MMHG | HEART RATE: 93 BPM | RESPIRATION RATE: 23 BRPM | WEIGHT: 169.75 LBS | DIASTOLIC BLOOD PRESSURE: 66 MMHG | HEART RATE: 93 BPM | RESPIRATION RATE: 23 BRPM | HEIGHT: 68 IN

## 2025-04-11 PROBLEM — G93.1 ANOXIC ENCEPHALOPATHY (HCC): Status: ACTIVE | Noted: 2025-04-11

## 2025-04-11 PROCEDURE — 6360000002 HC RX W HCPCS: Performed by: STUDENT IN AN ORGANIZED HEALTH CARE EDUCATION/TRAINING PROGRAM

## 2025-04-11 PROCEDURE — 2700000000 HC OXYGEN THERAPY PER DAY

## 2025-04-11 PROCEDURE — 94761 N-INVAS EAR/PLS OXIMETRY MLT: CPT

## 2025-04-11 PROCEDURE — 6360000002 HC RX W HCPCS: Performed by: INTERNAL MEDICINE

## 2025-04-11 PROCEDURE — 2580000003 HC RX 258: Performed by: INTERNAL MEDICINE

## 2025-04-11 RX ADMIN — MIDAZOLAM 2 MG: 1 INJECTION INTRAMUSCULAR; INTRAVENOUS at 02:12

## 2025-04-11 RX ADMIN — MORPHINE SULFATE 2 MG: 2 INJECTION, SOLUTION INTRAMUSCULAR; INTRAVENOUS at 05:32

## 2025-04-11 RX ADMIN — DEXTROSE AND SODIUM CHLORIDE: 5; 450 INJECTION, SOLUTION INTRAVENOUS at 02:25

## 2025-04-11 RX ADMIN — GLYCOPYRROLATE 0.1 MG: 0.2 INJECTION INTRAMUSCULAR; INTRAVENOUS at 02:12

## 2025-04-11 RX ADMIN — MORPHINE SULFATE 2 MG: 2 INJECTION, SOLUTION INTRAMUSCULAR; INTRAVENOUS at 11:31

## 2025-04-11 RX ADMIN — MORPHINE SULFATE 2 MG: 2 INJECTION, SOLUTION INTRAMUSCULAR; INTRAVENOUS at 02:31

## 2025-04-11 RX ADMIN — GLYCOPYRROLATE 0.1 MG: 0.2 INJECTION INTRAMUSCULAR; INTRAVENOUS at 11:31

## 2025-04-11 ASSESSMENT — PAIN SCALES - WONG BAKER
WONGBAKER_NUMERICALRESPONSE: NO HURT
WONGBAKER_NUMERICALRESPONSE: NO HURT

## 2025-04-11 ASSESSMENT — PAIN SCALES - GENERAL: PAINLEVEL_OUTOF10: 0

## 2025-04-11 NOTE — PLAN OF CARE
Problem: Safety - Adult  Goal: Free from fall injury  Outcome: Progressing     Problem: Discharge Planning  Goal: Discharge to home or other facility with appropriate resources  Outcome: Progressing     Problem: Neurosensory - Adult  Goal: Achieves stable or improved neurological status  Outcome: Completed  Goal: Absence of seizures  Outcome: Completed  Goal: Remains free of injury related to seizures activity  Outcome: Completed  Goal: Achieves maximal functionality and self care  Outcome: Completed     Problem: Neurosensory - Adult  Goal: Achieves stable or improved neurological status  Outcome: Completed  Goal: Absence of seizures  Outcome: Completed  Goal: Remains free of injury related to seizures activity  Outcome: Completed  Goal: Achieves maximal functionality and self care  Outcome: Completed     Problem: Respiratory - Adult  Goal: Achieves optimal ventilation and oxygenation  Outcome: Completed     Problem: Cardiovascular - Adult  Goal: Maintains optimal cardiac output and hemodynamic stability  Outcome: Completed  Goal: Absence of cardiac dysrhythmias or at baseline  Outcome: Completed     Problem: Gastrointestinal - Adult  Goal: Minimal or absence of nausea and vomiting  Outcome: Progressing  Goal: Maintains or returns to baseline bowel function  Outcome: Progressing     Problem: Genitourinary - Adult  Goal: Absence of urinary retention  Outcome: Progressing     Problem: ABCDS Injury Assessment  Goal: Absence of physical injury  Outcome: Progressing     Problem: Chronic Conditions and Co-morbidities  Goal: Patient's chronic conditions and co-morbidity symptoms are monitored and maintained or improved  Outcome: Progressing

## 2025-04-11 NOTE — CONSULTS
[facility] hosp[facility]    History & Physical    Primary Care Provider: No primary care provider on file.  Source of Information: Patient/family     Chief complaint:   Chief Complaint   Patient presents with    Unresponsive        History of Presenting Illness:   Phani Rossi is an 85  male with multiple medical problems including  a history of colon cancer, hypertension, depression, hyperlipidemia, hypothyroidism and PTSD, [facility] patient was found unresponsive in his apartment on 4/2 although unclear why EMS was called.  Apparently family had not heard from him for about 3 days and therefore could well up and down for that amount of time.  Initial GCS was 3 upon EMS arrival.  He was noted to have tissue damage to the right side of his face from prolonged contact with the floor and urine.  He was on a nonrebreather mask on arrival to the ED and was subsequently intubated.    Initial labs in the ED showed a sodium of 156, creatinine 4.49, lactate 2.6, troponin 240, proBNP 9600, hemoglobin 17.6, white count 19.5.    Patient was admitted to the ICU and continued on mechanical ventilation.    His ICU course was as follows:    4/3: Clarified patient identity.  Otherwise remains encephalopathic and critically ill.  Of note, blood cultures positive for gram-positive cocci on preliminary results.  4/4: MRI pending. Remains encephalopathic during SAT, but tolerating SBT  4/5: MRI demonstrating scattered strokes in multiple vascular territories consistent with strokes of embolic phenomenon.  Will attempt repeat TTE.  Otherwise continues to tolerate SBT but remains encephalopathic during SAT.  4/6: Echo showed EF of 40 to 42%, no thrombus was noted, overnight had asynchrony with the vent and switch to AC PC, creatinine has improved to 1.50, platelet has dropped to 58, blood culture was staph  Pulmonary's and MRSA screen positive, chest x-ray today morning reported pneumomediastinum, patient has significant facial       Electronically signed by Hank Deleon      XR CHEST PORTABLE   Final Result      Orogastric tube terminate in the proximal stomach. This could be advanced 5 cm   to have the sidehole within the stomach.         Electronically signed by AARON NAJERA      XR ABDOMEN (KUB) (SINGLE AP VIEW)   Final Result   No unexpected radiopaque foreign body.         Electronically signed by CHIRAG GRANDA      XR CHEST PORTABLE   Final Result   No significant change.      Electronically signed by Esequiel Sher      CT HEAD WO CONTRAST   Final Result   No acute intracranial hemorrhage, mass or infarct. Right occipital   encephalomalacia of completed infarcts, cerebral atrophy and white matter change   most compatible with small vessel ischemic and/or senescent change. Intracranial   atherosclerosis.         Electronically signed by Moose Torres      CT CERVICAL SPINE WO CONTRAST   Final Result   No fracture or other acute findings.      Electronically signed by Moose Torres      XR CHEST 1 VIEW   Final Result   Left costophrenic angle blunting which may represent chronic pleural parenchymal   scarring or a small effusion. Comparison with prior chest radiographs would be   helpful. ET tube as described..  .         Electronically signed by FLORINDA IRENE                  Assessment:   Multiple embolic strokes  Acute metabolic and probably anoxic encephalopathy  Polymicrobial bacteremia  Septic shock  Acute hypoxic respiratory failure  Pneumomediastinum  Acute kidney injury  Hyponatremia  Lactic acidosis  UTI  Thrombocytopenia          Plan:   Patient appears [facility] hospice  We will place him on scheduled Dilaudid and lorazepam with additional as needed as needed  Comfort care order set placed        CODE STATUS:  DNR     Social determinants of health: None known      Home medications were reviewed    Signed By: Sean Sanchez MD     April 11, 2025

## 2025-04-11 NOTE — DISCHARGE SUMMARY
Physician Discharge Summary     Patient ID:    Jerel Velasco Xxparesh  [id number]  89 y.o.  1/1/1880    Admit date: 4/2/2025    Discharge date : 4/11/2025      Final Diagnoses:   Hypernatremia [E87.0]  NSTEMI (non-ST elevated myocardial infarction) (HCC) [I21.4]  Acute kidney injury [N17.9]  Acute respiratory failure with hypoxia [J96.01]  Acute respiratory failure with hypoxemia [J96.01]      Reason for Hospitalization:  As above    Hospital Course:     ICU specialist Dr. Donovan Spoke with patient's son Forrest Rossi [phone] and he suggested that he would like to transition the patient to comfort measures only.  They wish for him to be comfortable however they also wish to have some sort of glucose administered due to Mandaen purposes and do not want it to to start for him to death     Dr. Donovan placed comfort measures order set  Will continue D5 half-normal saline at 50 cc/h as per family's request  Plan of care discussed with the nursing  DC all medications  DC all labs  Patient transferred out of ICU to floor service  Patient is comfort measures only           Tamera Levine MD     Signed:  Tamera Levine MD  4/11/2025  1:57 PM

## 2025-04-12 NOTE — PROGRESS NOTES
Physician Progress Note      PATIENT:               BALTAZAR MADRIGAL  CSN #:                  [id number]  :                       1880  ADMIT DATE:       2025 4:54 PM  DISCH DATE:        2025 2:21 PM  RESPONDING  PROVIDER #:        Tamera Levine MD          QUERY TEXT:    Severe Sepsis/septic shock was documented in the medical record on -->    per CC, ID and IM MDs.  The diagnosis was not noted in subsequent   documentation.  Please clarify the status of this condition.    The clinical indicators include:  H+P- Acute respiratory failure as observed due to the patient being   unresponsive.  Etiology not clear.  no details regarding his med r health   condition are available.  X-ray  show no particular abnormalities except minimal changes left lower   lung, but asp pna obviously cannot  be excluded 2nd to other events.  Shock. extremely volume depleted.  An infectious cannot be excluded.  Renal   failure, Unresponsive.    ADt: 25 4:54 PM LAB-WBC-19.5, Neut 79.3 % ,14.66 neut absol  LA-3.42,   Procal  3.95 VS ,111,105,108 RR 33,45,34,32, Temp rect. 95.1    -Nephro MD Doyle  MEGHANA  prerenal azotemia from vol. depletion/hypotension/sepsis  adm BUN/creat 152/4.4  ? baseline kidney func.  hypotonic IV fluids  Sev sepsis/shock  UTI, Bacteremia On IV Levo  -    4/3-CC MD Mancilla PN -Neuro  Acute metabolic encephalopathy  Encephalopathy workup UDS, EtOH, ammonia  Unsure of what led to patient being found down.  Head CT unremarkable  Mild sedation for ventilator compliance  Septic shock  Pressors ongoing along with stress dose steroids.    Acute hypoxic respiratory failure- Continue mechanical ventilation.     ID Nishant PN-sepsis with bacteremia, probable UTI, facial wound infection   and possible LLL pneumonia. Currently on IV Vancomycin and Meropenem. He   remains intubated.     CC MD Donovan -Assessment and plan:  Altered mental status, CVA, sepsis slow improvement noted  CMP with

## 2025-04-17 ENCOUNTER — HOSPITAL ENCOUNTER (INPATIENT)
Facility: HOSPITAL | Age: 85
LOS: 5 days | DRG: 064 | End: 2025-04-25
Payer: OTHER GOVERNMENT

## 2025-04-17 PROCEDURE — 2500000003 HC RX 250 WO HCPCS: Performed by: INTERNAL MEDICINE

## 2025-04-17 PROCEDURE — G0378 HOSPITAL OBSERVATION PER HR: HCPCS

## 2025-04-17 PROCEDURE — 96376 TX/PRO/DX INJ SAME DRUG ADON: CPT

## 2025-04-17 PROCEDURE — 6370000000 HC RX 637 (ALT 250 FOR IP): Performed by: INTERNAL MEDICINE

## 2025-04-17 PROCEDURE — 96375 TX/PRO/DX INJ NEW DRUG ADDON: CPT

## 2025-04-17 PROCEDURE — 6360000002 HC RX W HCPCS: Performed by: INTERNAL MEDICINE

## 2025-04-17 PROCEDURE — 96374 THER/PROPH/DIAG INJ IV PUSH: CPT

## 2025-04-17 RX ORDER — HYDROMORPHONE HYDROCHLORIDE 1 MG/ML
1 INJECTION, SOLUTION INTRAMUSCULAR; INTRAVENOUS; SUBCUTANEOUS EVERY 30 MIN PRN
Refills: 0 | Status: DISCONTINUED | OUTPATIENT
Start: 2025-04-17 | End: 2025-04-25 | Stop reason: HOSPADM

## 2025-04-17 RX ORDER — LORAZEPAM 2 MG/ML
1 INJECTION INTRAMUSCULAR
Status: DISCONTINUED | OUTPATIENT
Start: 2025-04-17 | End: 2025-04-18

## 2025-04-17 RX ORDER — HYDROMORPHONE HYDROCHLORIDE 1 MG/ML
1 INJECTION, SOLUTION INTRAMUSCULAR; INTRAVENOUS; SUBCUTANEOUS EVERY 4 HOURS
Refills: 0 | Status: DISCONTINUED | OUTPATIENT
Start: 2025-04-17 | End: 2025-04-25 | Stop reason: HOSPADM

## 2025-04-17 RX ORDER — LORAZEPAM 2 MG/ML
1 INJECTION INTRAMUSCULAR EVERY 4 HOURS
Status: DISCONTINUED | OUTPATIENT
Start: 2025-04-17 | End: 2025-04-18 | Stop reason: SDUPTHER

## 2025-04-17 RX ORDER — ACETAMINOPHEN 650 MG/1
650 SUPPOSITORY RECTAL EVERY 4 HOURS PRN
Status: DISCONTINUED | OUTPATIENT
Start: 2025-04-17 | End: 2025-04-25 | Stop reason: HOSPADM

## 2025-04-17 RX ORDER — SCOPOLAMINE 1 MG/3D
1 PATCH, EXTENDED RELEASE TRANSDERMAL
Status: DISCONTINUED | OUTPATIENT
Start: 2025-04-17 | End: 2025-04-25 | Stop reason: HOSPADM

## 2025-04-17 RX ADMIN — HYDROMORPHONE HYDROCHLORIDE 1 MG: 1 INJECTION, SOLUTION INTRAMUSCULAR; INTRAVENOUS; SUBCUTANEOUS at 21:05

## 2025-04-17 RX ADMIN — LORAZEPAM 1 MG: 2 INJECTION INTRAMUSCULAR; INTRAVENOUS at 17:27

## 2025-04-17 RX ADMIN — LORAZEPAM 1 MG: 2 INJECTION INTRAMUSCULAR; INTRAVENOUS at 21:05

## 2025-04-17 RX ADMIN — LORAZEPAM 1 MG: 2 INJECTION INTRAMUSCULAR; INTRAVENOUS at 14:50

## 2025-04-17 RX ADMIN — HYDROMORPHONE HYDROCHLORIDE 1 MG: 1 INJECTION, SOLUTION INTRAMUSCULAR; INTRAVENOUS; SUBCUTANEOUS at 14:49

## 2025-04-17 RX ADMIN — HYDROMORPHONE HYDROCHLORIDE 1 MG: 1 INJECTION, SOLUTION INTRAMUSCULAR; INTRAVENOUS; SUBCUTANEOUS at 17:27

## 2025-04-17 ASSESSMENT — PAIN SCALES - GENERAL: PAINLEVEL_OUTOF10: 4

## 2025-04-17 NOTE — PROGRESS NOTES
Ativan given per order pt chart was switched to comfort care and un able to waste via omni cell   Ativan was manually wasted witnessed by Debbie Rodriguez RN @1230npm

## 2025-04-17 NOTE — CARE COORDINATION
DCP: comfort care  GLORIA: TBD    Pt is on comfort care. Pt now showing up as having Medicare Part A and [employer]. [facility] to [facility] and provide [facility]s.     CM team will continue following.

## 2025-04-18 PROCEDURE — 6360000002 HC RX W HCPCS: Performed by: INTERNAL MEDICINE

## 2025-04-18 PROCEDURE — 2500000003 HC RX 250 WO HCPCS: Performed by: INTERNAL MEDICINE

## 2025-04-18 PROCEDURE — 96375 TX/PRO/DX INJ NEW DRUG ADDON: CPT

## 2025-04-18 PROCEDURE — 96376 TX/PRO/DX INJ SAME DRUG ADON: CPT

## 2025-04-18 PROCEDURE — G0378 HOSPITAL OBSERVATION PER HR: HCPCS

## 2025-04-18 PROCEDURE — 94761 N-INVAS EAR/PLS OXIMETRY MLT: CPT

## 2025-04-18 RX ORDER — MIDAZOLAM HYDROCHLORIDE 2 MG/2ML
1 INJECTION, SOLUTION INTRAMUSCULAR; INTRAVENOUS EVERY 4 HOURS
Status: DISCONTINUED | OUTPATIENT
Start: 2025-04-18 | End: 2025-04-25 | Stop reason: HOSPADM

## 2025-04-18 RX ADMIN — LORAZEPAM 1 MG: 2 INJECTION INTRAMUSCULAR; INTRAVENOUS at 00:47

## 2025-04-18 RX ADMIN — MIDAZOLAM 1 MG: 1 INJECTION INTRAMUSCULAR; INTRAVENOUS at 20:41

## 2025-04-18 RX ADMIN — MIDAZOLAM 1 MG: 1 INJECTION INTRAMUSCULAR; INTRAVENOUS at 11:18

## 2025-04-18 RX ADMIN — HYDROMORPHONE HYDROCHLORIDE 1 MG: 1 INJECTION, SOLUTION INTRAMUSCULAR; INTRAVENOUS; SUBCUTANEOUS at 05:55

## 2025-04-18 RX ADMIN — MIDAZOLAM 1 MG: 1 INJECTION INTRAMUSCULAR; INTRAVENOUS at 23:37

## 2025-04-18 RX ADMIN — HYDROMORPHONE HYDROCHLORIDE 1 MG: 1 INJECTION, SOLUTION INTRAMUSCULAR; INTRAVENOUS; SUBCUTANEOUS at 20:40

## 2025-04-18 RX ADMIN — HYDROMORPHONE HYDROCHLORIDE 1 MG: 1 INJECTION, SOLUTION INTRAMUSCULAR; INTRAVENOUS; SUBCUTANEOUS at 11:18

## 2025-04-18 RX ADMIN — MIDAZOLAM 1 MG: 1 INJECTION INTRAMUSCULAR; INTRAVENOUS at 15:50

## 2025-04-18 RX ADMIN — HYDROMORPHONE HYDROCHLORIDE 1 MG: 1 INJECTION, SOLUTION INTRAMUSCULAR; INTRAVENOUS; SUBCUTANEOUS at 00:47

## 2025-04-18 RX ADMIN — HYDROMORPHONE HYDROCHLORIDE 1 MG: 1 INJECTION, SOLUTION INTRAMUSCULAR; INTRAVENOUS; SUBCUTANEOUS at 07:59

## 2025-04-18 RX ADMIN — HYDROMORPHONE HYDROCHLORIDE 1 MG: 1 INJECTION, SOLUTION INTRAMUSCULAR; INTRAVENOUS; SUBCUTANEOUS at 15:49

## 2025-04-18 RX ADMIN — MIDAZOLAM 1 MG: 1 INJECTION INTRAMUSCULAR; INTRAVENOUS at 07:59

## 2025-04-18 NOTE — CARE COORDINATION
DCP: comfort care; does not meet GIP criteria  GLORIA: >48 hours      Pt was admitted to inpt, then switched to GIP x1 week and flipped back to inpt as he no longer met GIP criteria.     Pt does not have a LTC payor or caregiver. Pt has a son that will make some decisions, but has not seen pt in >25 years.     CM will attempt to [facility] to [facility] to see if they are able to assist as insurance is now showing in pt chart.     CM team will continue following.

## 2025-04-18 NOTE — PLAN OF CARE
Problem: Discharge Planning  Goal: Discharge to home or other facility with appropriate resources  Outcome: Progressing     Problem: Skin/Tissue Integrity  Goal: Skin integrity remains intact  Description: 1.  Monitor for areas of redness and/or skin breakdown2.  Assess vascular access sites hourly3.  Every 4-6 hours minimum:  Change oxygen saturation probe site4.  Every 4-6 hours:  If on nasal continuous positive airway pressure, respiratory therapy assess nares and determine need for appliance change or resting period  Outcome: Progressing     Problem: Pain  Goal: Verbalizes/displays adequate comfort level or baseline comfort level  Outcome: Progressing     Problem: Safety - Adult  Goal: Free from fall injury  Outcome: Progressing

## 2025-04-18 NOTE — PROGRESS NOTES
Hospitalist Progress Note               Daily Progress Note: 2025      Hospital Day: 2     Chief complaint: No chief complaint on file.       Subjective:   Hospital course to date:    85-year-old male  with colon cancer, hypertension, hyperlipidemia, hypothyroidism, PTSD who was found unresponsive in his apartment on .  Patient was probably down for about 3 days.  In the ED, he had MEGHANA and rhabdomyolysis.  Patient was intubated for airway protection     During his ICU stay, echo showed an EF of 40%.  MRI demonstrated scattered strokes in multiple vascular territories consistent with an Bolick strokes.    Patient showed no significant neurologic improvement.    His son elected to transition over to comfort care on     Patient was ultimately admitted to [facility] hospice that showed no sign of deterioration and on  was felt not to be meeting [facility] criteria and was transition back to   acute care for continued comfort care  --------  Patient is seen today for follow-up.  He remains unresponsive.        Medications reviewed  Current Facility-Administered Medications   Medication Dose Route Frequency    scopolamine (TRANSDERM-SCOP) transdermal patch 1 patch  1 patch TransDERmal Q72H    acetaminophen (TYLENOL) suppository 650 mg  650 mg Rectal Q4H PRN    LORazepam (ATIVAN) injection 1 mg  1 mg IntraVENous Q2H PRN    HYDROmorphone HCl PF (DILAUDID) injection 1 mg  1 mg IntraVENous Q4H    HYDROmorphone HCl PF (DILAUDID) injection 1 mg  1 mg IntraVENous Q30 Min PRN    LORazepam (ATIVAN) injection 1 mg  1 mg IntraVENous Q4H       Review of Systems:   Review of systems not obtained due to patient factors.    Objective:   Physical Exam:     /69   Pulse 98   Temp 98 °F (36.7 °C) (Axillary)   Resp 15   SpO2 94%         Temp (24hrs), Av.3 °F (36.8 °C), Min:98 °F (36.7 °C), Max:98.4 °F (36.9 °C)     1901 -  0700  In: -   Out: 750 [Urine:750]   No intake/output data recorded.    Mode Rate TV Press  adverse drug reaction  [] Other -   [] Change in code status:    [] Decision to escalate care:    [] Major surgery/procedure with associated risk factors:    ----------------------------------------------------------------------  C. Data (any 2)  [] Discussed current management and discharge planning options with Case Management.  [] Discussed management of the case with:    [] Telemetry personally reviewed and interpreted as documented above    [] Imaging personally reviewed and interpreted, includes:    [] Data Review (any 3)  [] All available Consultant notes from yesterday/today were reviewed  [] All current labs were reviewed and interpreted for clinical significance   [] Appropriate follow-up labs were ordered  [] Collateral history obtained from:               Assessment:  Anoxic encephalopathy (HCC) [G93.1]  Multiple embolic strokes  Acute metabolic and  anoxic encephalopathy  Polymicrobial bacteremia  Septic shock  Acute hypoxic respiratory failure  Pneumomediastinum  Acute kidney injury  Hyponatremia  Lactic acidosis  UTI  Thrombocytopenia        Plan:  Continue with comfort care only  Continue Dilaudid 1 mg every 4 hours  Hospital is completely out of intravenous lorazepam so we will switch to Versed 1 mg every 4 hours scheduled      Code status: DNR    Social determinants of health: none      Estimated discharge date//time frame/disposition:    Barriers to discharge:           Sean Sanchez MD

## 2025-04-19 PROCEDURE — 6360000002 HC RX W HCPCS: Performed by: INTERNAL MEDICINE

## 2025-04-19 PROCEDURE — G0378 HOSPITAL OBSERVATION PER HR: HCPCS

## 2025-04-19 PROCEDURE — 2500000003 HC RX 250 WO HCPCS: Performed by: INTERNAL MEDICINE

## 2025-04-19 PROCEDURE — 94761 N-INVAS EAR/PLS OXIMETRY MLT: CPT

## 2025-04-19 PROCEDURE — 96376 TX/PRO/DX INJ SAME DRUG ADON: CPT

## 2025-04-19 RX ADMIN — MIDAZOLAM 1 MG: 1 INJECTION INTRAMUSCULAR; INTRAVENOUS at 23:23

## 2025-04-19 RX ADMIN — HYDROMORPHONE HYDROCHLORIDE 1 MG: 1 INJECTION, SOLUTION INTRAMUSCULAR; INTRAVENOUS; SUBCUTANEOUS at 05:06

## 2025-04-19 RX ADMIN — MIDAZOLAM 1 MG: 1 INJECTION INTRAMUSCULAR; INTRAVENOUS at 15:12

## 2025-04-19 RX ADMIN — MIDAZOLAM 1 MG: 1 INJECTION INTRAMUSCULAR; INTRAVENOUS at 06:54

## 2025-04-19 RX ADMIN — MIDAZOLAM 1 MG: 1 INJECTION INTRAMUSCULAR; INTRAVENOUS at 19:33

## 2025-04-19 RX ADMIN — MIDAZOLAM 1 MG: 1 INJECTION INTRAMUSCULAR; INTRAVENOUS at 03:36

## 2025-04-19 RX ADMIN — MIDAZOLAM 1 MG: 1 INJECTION INTRAMUSCULAR; INTRAVENOUS at 11:13

## 2025-04-19 RX ADMIN — HYDROMORPHONE HYDROCHLORIDE 1 MG: 1 INJECTION, SOLUTION INTRAMUSCULAR; INTRAVENOUS; SUBCUTANEOUS at 10:11

## 2025-04-19 RX ADMIN — HYDROMORPHONE HYDROCHLORIDE 1 MG: 1 INJECTION, SOLUTION INTRAMUSCULAR; INTRAVENOUS; SUBCUTANEOUS at 16:59

## 2025-04-19 RX ADMIN — HYDROMORPHONE HYDROCHLORIDE 1 MG: 1 INJECTION, SOLUTION INTRAMUSCULAR; INTRAVENOUS; SUBCUTANEOUS at 20:33

## 2025-04-19 RX ADMIN — HYDROMORPHONE HYDROCHLORIDE 1 MG: 1 INJECTION, SOLUTION INTRAMUSCULAR; INTRAVENOUS; SUBCUTANEOUS at 00:45

## 2025-04-19 RX ADMIN — HYDROMORPHONE HYDROCHLORIDE 1 MG: 1 INJECTION, SOLUTION INTRAMUSCULAR; INTRAVENOUS; SUBCUTANEOUS at 12:56

## 2025-04-19 ASSESSMENT — PAIN SCALES - WONG BAKER: WONGBAKER_NUMERICALRESPONSE: NO HURT

## 2025-04-19 NOTE — PROGRESS NOTES
Hospitalist Progress Note               Daily Progress Note: 2025      Hospital Day: 3     Chief complaint: No chief complaint on file.       Subjective:   Hospital course to date:    85-year-old male  with colon cancer, hypertension, hyperlipidemia, hypothyroidism, PTSD who was found unresponsive in his apartment on .  Patient was probably down for about 3 days.  In the ED, he had MEGHANA and rhabdomyolysis.  Patient was intubated for airway protection     During his ICU stay, echo showed an EF of 40%.  MRI demonstrated scattered strokes in multiple vascular territories consistent with an Bolick strokes.    Patient showed no significant neurologic improvement.    His son elected to transition over to comfort care on     Patient was ultimately admitted to [facility] hospice that showed no sign of deterioration and on  was felt not to be meeting [facility] criteria and was transition back to   acute care for continued comfort care  --------  Patient is seen today for the follow-up.  He remains unresponsive        Medications reviewed  Current Facility-Administered Medications   Medication Dose Route Frequency    midazolam PF (VERSED) injection 1 mg  1 mg IntraVENous Q4H    scopolamine (TRANSDERM-SCOP) transdermal patch 1 patch  1 patch TransDERmal Q72H    acetaminophen (TYLENOL) suppository 650 mg  650 mg Rectal Q4H PRN    HYDROmorphone HCl PF (DILAUDID) injection 1 mg  1 mg IntraVENous Q4H    HYDROmorphone HCl PF (DILAUDID) injection 1 mg  1 mg IntraVENous Q30 Min PRN       Review of Systems:   Review of systems not obtained due to patient factors.    Objective:   Physical Exam:     BP (!) 88/55 Comment: comfort care  Pulse (!) 105 Comment: comfort care  Temp 100.2 °F (37.9 °C) (Axillary)   Resp 24   SpO2 90%    O2 Device: None (Room air)    Temp (24hrs), Av.2 °F (37.3 °C), Min:98.2 °F (36.8 °C), Max:100.2 °F (37.9 °C)    No intake/output data recorded.    1901 -  0700  In: 0   Out: 1350

## 2025-04-19 NOTE — PLAN OF CARE
Problem: Discharge Planning  Goal: Discharge to home or other facility with appropriate resources  4/18/2025 2044 by Ashia Snyder RN  Outcome: Progressing  4/18/2025 1619 by Marcial Tucker RN  Outcome: Progressing     Problem: Skin/Tissue Integrity  Goal: Skin integrity remains intact  Description: 1.  Monitor for areas of redness and/or skin breakdown2.  Assess vascular access sites hourly3.  Every 4-6 hours minimum:  Change oxygen saturation probe site4.  Every 4-6 hours:  If on nasal continuous positive airway pressure, respiratory therapy assess nares and determine need for appliance change or resting period  4/18/2025 2044 by Ashia Snyder RN  Outcome: Progressing  4/18/2025 1619 by Marcial Tucker RN  Outcome: Progressing     Problem: Pain  Goal: Verbalizes/displays adequate comfort level or baseline comfort level  4/18/2025 2044 by Ashia Snyder RN  Outcome: Progressing  4/18/2025 1619 by Marcial Tucker RN  Outcome: Progressing     Problem: Safety - Adult  Goal: Free from fall injury  4/18/2025 2044 by Ashia Snyder RN  Outcome: Progressing  4/18/2025 1619 by Marcial Tucker RN  Outcome: Progressing

## 2025-04-20 PROCEDURE — G0378 HOSPITAL OBSERVATION PER HR: HCPCS

## 2025-04-20 PROCEDURE — 6360000002 HC RX W HCPCS: Performed by: INTERNAL MEDICINE

## 2025-04-20 PROCEDURE — 2700000000 HC OXYGEN THERAPY PER DAY

## 2025-04-20 PROCEDURE — 2500000003 HC RX 250 WO HCPCS: Performed by: INTERNAL MEDICINE

## 2025-04-20 PROCEDURE — 94761 N-INVAS EAR/PLS OXIMETRY MLT: CPT

## 2025-04-20 PROCEDURE — 96376 TX/PRO/DX INJ SAME DRUG ADON: CPT

## 2025-04-20 PROCEDURE — 6370000000 HC RX 637 (ALT 250 FOR IP): Performed by: INTERNAL MEDICINE

## 2025-04-20 RX ADMIN — MIDAZOLAM 1 MG: 1 INJECTION INTRAMUSCULAR; INTRAVENOUS at 15:44

## 2025-04-20 RX ADMIN — MIDAZOLAM 1 MG: 1 INJECTION INTRAMUSCULAR; INTRAVENOUS at 11:08

## 2025-04-20 RX ADMIN — HYDROMORPHONE HYDROCHLORIDE 1 MG: 1 INJECTION, SOLUTION INTRAMUSCULAR; INTRAVENOUS; SUBCUTANEOUS at 18:02

## 2025-04-20 RX ADMIN — MIDAZOLAM 1 MG: 1 INJECTION INTRAMUSCULAR; INTRAVENOUS at 03:25

## 2025-04-20 RX ADMIN — HYDROMORPHONE HYDROCHLORIDE 1 MG: 1 INJECTION, SOLUTION INTRAMUSCULAR; INTRAVENOUS; SUBCUTANEOUS at 05:04

## 2025-04-20 RX ADMIN — HYDROMORPHONE HYDROCHLORIDE 1 MG: 1 INJECTION, SOLUTION INTRAMUSCULAR; INTRAVENOUS; SUBCUTANEOUS at 09:22

## 2025-04-20 RX ADMIN — HYDROMORPHONE HYDROCHLORIDE 1 MG: 1 INJECTION, SOLUTION INTRAMUSCULAR; INTRAVENOUS; SUBCUTANEOUS at 20:45

## 2025-04-20 RX ADMIN — MIDAZOLAM 1 MG: 1 INJECTION INTRAMUSCULAR; INTRAVENOUS at 06:50

## 2025-04-20 RX ADMIN — HYDROMORPHONE HYDROCHLORIDE 1 MG: 1 INJECTION, SOLUTION INTRAMUSCULAR; INTRAVENOUS; SUBCUTANEOUS at 01:01

## 2025-04-20 RX ADMIN — HYDROMORPHONE HYDROCHLORIDE 1 MG: 1 INJECTION, SOLUTION INTRAMUSCULAR; INTRAVENOUS; SUBCUTANEOUS at 13:12

## 2025-04-20 RX ADMIN — MIDAZOLAM 1 MG: 1 INJECTION INTRAMUSCULAR; INTRAVENOUS at 19:39

## 2025-04-20 RX ADMIN — MIDAZOLAM 1 MG: 1 INJECTION INTRAMUSCULAR; INTRAVENOUS at 23:04

## 2025-04-20 NOTE — PROGRESS NOTES
Hospitalist Progress Note               Daily Progress Note: 2025      Hospital Day: 4     Chief complaint: No chief complaint on file.       Subjective:   Hospital course to date:    85-year-old male  with colon cancer, hypertension, hyperlipidemia, hypothyroidism, PTSD who was found unresponsive in his apartment on .  Patient was probably down for about 3 days.  In the ED, he had MEGHANA and rhabdomyolysis.  Patient was intubated for airway protection     During his ICU stay, echo showed an EF of 40%.  MRI demonstrated scattered strokes in multiple vascular territories consistent with an Bolick strokes.    Patient showed no significant neurologic improvement.    His son elected to transition over to comfort care on     Patient was ultimately admitted to [facility] hospice that showed no sign of deterioration and on  was felt not to be meeting [facility] criteria and was transition back to   acute care for continued comfort care  --------  Patient is seen and examined today for the follow-up.  Remains unresponsive, breathing heavily.        Medications reviewed  Current Facility-Administered Medications   Medication Dose Route Frequency    midazolam PF (VERSED) injection 1 mg  1 mg IntraVENous Q4H    scopolamine (TRANSDERM-SCOP) transdermal patch 1 patch  1 patch TransDERmal Q72H    acetaminophen (TYLENOL) suppository 650 mg  650 mg Rectal Q4H PRN    HYDROmorphone HCl PF (DILAUDID) injection 1 mg  1 mg IntraVENous Q4H    HYDROmorphone HCl PF (DILAUDID) injection 1 mg  1 mg IntraVENous Q30 Min PRN       Review of Systems:   Review of systems not obtained due to patient factors.    Objective:   Physical Exam:     BP (!) 73/47 Comment: comfort care  Pulse 95   Temp 97.9 °F (36.6 °C) (Axillary)   Resp 16   SpO2 93%  O2 Flow Rate (L/min): 1 L/min O2 Device: Nasal cannula    Temp (24hrs), Av.2 °F (36.8 °C), Min:97.9 °F (36.6 °C), Max:98.6 °F (37 °C)    No intake/output data recorded.   1901 -

## 2025-04-20 NOTE — PLAN OF CARE
Problem: Skin/Tissue Integrity  Goal: Skin integrity remains intact  Description: 1.  Monitor for areas of redness and/or skin breakdown2.  Assess vascular access sites hourly3.  Every 4-6 hours minimum:  Change oxygen saturation probe site4.  Every 4-6 hours:  If on nasal continuous positive airway pressure, respiratory therapy assess nares and determine need for appliance change or resting period  4/19/2025 2151 by Susan Malolry, RN  Outcome: Progressing

## 2025-04-21 PROBLEM — R41.82 AMS (ALTERED MENTAL STATUS): Status: ACTIVE | Noted: 2025-04-21

## 2025-04-21 PROCEDURE — 6360000002 HC RX W HCPCS: Performed by: INTERNAL MEDICINE

## 2025-04-21 PROCEDURE — 96376 TX/PRO/DX INJ SAME DRUG ADON: CPT

## 2025-04-21 PROCEDURE — 2500000003 HC RX 250 WO HCPCS: Performed by: INTERNAL MEDICINE

## 2025-04-21 PROCEDURE — 2700000000 HC OXYGEN THERAPY PER DAY

## 2025-04-21 PROCEDURE — 1100000000 HC RM PRIVATE

## 2025-04-21 PROCEDURE — 94761 N-INVAS EAR/PLS OXIMETRY MLT: CPT

## 2025-04-21 RX ADMIN — MIDAZOLAM 1 MG: 1 INJECTION INTRAMUSCULAR; INTRAVENOUS at 16:00

## 2025-04-21 RX ADMIN — HYDROMORPHONE HYDROCHLORIDE 1 MG: 1 INJECTION, SOLUTION INTRAMUSCULAR; INTRAVENOUS; SUBCUTANEOUS at 20:46

## 2025-04-21 RX ADMIN — MIDAZOLAM 1 MG: 1 INJECTION INTRAMUSCULAR; INTRAVENOUS at 19:31

## 2025-04-21 RX ADMIN — MIDAZOLAM 1 MG: 1 INJECTION INTRAMUSCULAR; INTRAVENOUS at 02:52

## 2025-04-21 RX ADMIN — HYDROMORPHONE HYDROCHLORIDE 1 MG: 1 INJECTION, SOLUTION INTRAMUSCULAR; INTRAVENOUS; SUBCUTANEOUS at 18:00

## 2025-04-21 RX ADMIN — HYDROMORPHONE HYDROCHLORIDE 1 MG: 1 INJECTION, SOLUTION INTRAMUSCULAR; INTRAVENOUS; SUBCUTANEOUS at 13:49

## 2025-04-21 RX ADMIN — HYDROMORPHONE HYDROCHLORIDE 1 MG: 1 INJECTION, SOLUTION INTRAMUSCULAR; INTRAVENOUS; SUBCUTANEOUS at 00:30

## 2025-04-21 RX ADMIN — HYDROMORPHONE HYDROCHLORIDE 1 MG: 1 INJECTION, SOLUTION INTRAMUSCULAR; INTRAVENOUS; SUBCUTANEOUS at 05:21

## 2025-04-21 RX ADMIN — MIDAZOLAM 1 MG: 1 INJECTION INTRAMUSCULAR; INTRAVENOUS at 22:44

## 2025-04-21 RX ADMIN — HYDROMORPHONE HYDROCHLORIDE 1 MG: 1 INJECTION, SOLUTION INTRAMUSCULAR; INTRAVENOUS; SUBCUTANEOUS at 09:41

## 2025-04-21 RX ADMIN — MIDAZOLAM 1 MG: 1 INJECTION INTRAMUSCULAR; INTRAVENOUS at 07:26

## 2025-04-21 RX ADMIN — MIDAZOLAM 1 MG: 1 INJECTION INTRAMUSCULAR; INTRAVENOUS at 11:54

## 2025-04-21 NOTE — PLAN OF CARE
Problem: Pain  Goal: Verbalizes/displays adequate comfort level or baseline comfort level  4/20/2025 2014 by Ashia Snyder RN  Outcome: Progressing  4/20/2025 1549 by Dianelys Valenzuela RN  Outcome: Progressing     Problem: Safety - Adult  Goal: Free from fall injury  4/20/2025 2014 by Ashia Snyder, RN  Outcome: Progressing  4/20/2025 1549 by Dianelys Valenzuela RN  Outcome: Progressing

## 2025-04-21 NOTE — PROGRESS NOTES
Spiritual Health History and Assessment/Progress Note  [facility]    Initial Encounter,  ,  ,      Name: Jerel Hamm MRN: [id number]    Age: 89 y.o.     Sex: male   Language: Unavailable   Advent: Unknown   Anoxic encephalopathy (HCC)     Date: 4/21/2025                           Spiritual Assessment began in SSR 4 [facility] ONCOLOGY        Referral/Consult From: Rounding   Encounter Overview/Reason: Initial Encounter  Service Provided For: Patient    Isamar, Belief, Meaning:   Patient unable to assess at this time  Family/Friends No family/friends present      Importance and Influence:  Patient unable to assess at this time  Family/Friends No family/friends present    Community:  Patient Other: Unable to assess at this time  Family/Friends No family/friends present    Assessment and Plan of Care:     Patient Interventions include: Other: Provided prayer at the bedside  and the ministry of presence for patient  Family/Friends Interventions include: No family/friends present    Patient Plan of Care: Other:  available as needed  Family/Friends Plan of Care: No family/friends present    Electronically signed by NICHOLE Rawls on 4/21/2025 at 10:43 AM   The  visited with the patient in Room 405 during rounding, provided prayer and the ministry of presence at the bedside, even though the patient was unable to respond.

## 2025-04-21 NOTE — CARE COORDINATION
DCP: comfort care  GLORIA: 48 hours    [facility] to [facility] to see if they are able to assist with LTC hospice placement.     CM team will continue following.

## 2025-04-21 NOTE — CARE COORDINATION
04/21/25 1547   Service Assessment   Patient Orientation Unresponsive   Cognition Other (see comment)  (comfort care)   History Provided By Medical Record   Primary Caregiver Other (Comment)  (comfort care)   Accompanied By/Relationship alone   Support Systems None   Patient's Healthcare Decision Maker is: Legal Next of Kin   PCP Verified by CM No   Prior Functional Level Assistance with the following:;Mobility;Shopping;Bathing;Dressing;Toileting;Feeding;Cooking;Housework   Current Functional Level Assistance with the following:;Shopping;Mobility;Bathing;Dressing;Toileting;Feeding;Cooking;Housework   Can patient return to prior living arrangement No   Ability to make needs known: Unable   Family able to assist with home care needs: No   Would you like for me to discuss the discharge plan with any other family members/significant others, and if so, who? Yes  (son Phani Rossi)   Financial Resources  ([facility])   Community Resources None   CM/ Referral Other (see comment)  (dc planning)     Pt is admitted comfort care. No active family or friends for home hospice setup.     CM will try to get assistance from [facility] with placement.       Advance Care Planning   Healthcare Decision Maker:    Primary Decision Maker: Phani Rossi - [facility] - [phone]    Click here to complete Healthcare Decision Makers including selection of the Healthcare Decision Maker Relationship (ie \"Primary\").

## 2025-04-21 NOTE — PROGRESS NOTES
Hospitalist Progress Note               Daily Progress Note: 2025      Hospital Day: 5     Chief complaint: No chief complaint on file.       Subjective:   Hospital course to date:    85-year-old male  with colon cancer, hypertension, hyperlipidemia, hypothyroidism, PTSD who was found unresponsive in his apartment on .  Patient was probably down for about 3 days.  In the ED, he had MEGHANA and rhabdomyolysis.  Patient was intubated for airway protection     During his ICU stay, echo showed an EF of 40%.  MRI demonstrated scattered strokes in multiple vascular territories consistent with an Bolick strokes.    Patient showed no significant neurologic improvement.    His son elected to transition over to comfort care on     Patient was ultimately admitted to [facility] hospice that showed no sign of deterioration and on  was felt not to be meeting [facility] criteria and was transition back to   acute care for continued comfort care  --------  Patient is seen and examined today for the follow-up with the nurse.  He remains unresponsive.  Case management is working on getting [facility] for the patient        Medications reviewed  Current Facility-Administered Medications   Medication Dose Route Frequency    midazolam PF (VERSED) injection 1 mg  1 mg IntraVENous Q4H    scopolamine (TRANSDERM-SCOP) transdermal patch 1 patch  1 patch TransDERmal Q72H    acetaminophen (TYLENOL) suppository 650 mg  650 mg Rectal Q4H PRN    HYDROmorphone HCl PF (DILAUDID) injection 1 mg  1 mg IntraVENous Q4H    HYDROmorphone HCl PF (DILAUDID) injection 1 mg  1 mg IntraVENous Q30 Min PRN       Review of Systems:   Review of systems not obtained due to patient factors.    Objective:   Physical Exam:     BP (!) 74/52   Pulse 88   Temp 97.5 °F (36.4 °C) (Axillary)   Resp 12   SpO2 94%  O2 Flow Rate (L/min): 1 L/min O2 Device: Nasal cannula    Temp (24hrs), Av.4 °F (36.3 °C), Min:97.3 °F (36.3 °C), Max:97.5 °F

## 2025-04-21 NOTE — PLAN OF CARE
Problem: Discharge Planning  Goal: Discharge to home or other facility with appropriate resources  Outcome: Not Progressing  Flowsheets (Taken 4/21/2025 0730)  Discharge to home or other facility with appropriate resources: Identify barriers to discharge with patient and caregiver     Problem: Skin/Tissue Integrity  Goal: Skin integrity remains intact  Description: 1.  Monitor for areas of redness and/or skin breakdown2.  Assess vascular access sites hourly3.  Every 4-6 hours minimum:  Change oxygen saturation probe site4.  Every 4-6 hours:  If on nasal continuous positive airway pressure, respiratory therapy assess nares and determine need for appliance change or resting period  Outcome: Not Progressing  Flowsheets (Taken 4/21/2025 0730)  Skin Integrity Remains Intact: Monitor for areas of redness and/or skin breakdown     Problem: Pain  Goal: Verbalizes/displays adequate comfort level or baseline comfort level  Outcome: Not Progressing     Problem: Safety - Adult  Goal: Free from fall injury  Outcome: Not Progressing

## 2025-04-22 PROCEDURE — 2500000003 HC RX 250 WO HCPCS: Performed by: INTERNAL MEDICINE

## 2025-04-22 PROCEDURE — 1100000000 HC RM PRIVATE

## 2025-04-22 PROCEDURE — 2700000000 HC OXYGEN THERAPY PER DAY

## 2025-04-22 PROCEDURE — 6360000002 HC RX W HCPCS: Performed by: INTERNAL MEDICINE

## 2025-04-22 PROCEDURE — 94761 N-INVAS EAR/PLS OXIMETRY MLT: CPT

## 2025-04-22 RX ADMIN — HYDROMORPHONE HYDROCHLORIDE 1 MG: 1 INJECTION, SOLUTION INTRAMUSCULAR; INTRAVENOUS; SUBCUTANEOUS at 20:21

## 2025-04-22 RX ADMIN — MIDAZOLAM 1 MG: 1 INJECTION INTRAMUSCULAR; INTRAVENOUS at 16:24

## 2025-04-22 RX ADMIN — MIDAZOLAM 1 MG: 1 INJECTION INTRAMUSCULAR; INTRAVENOUS at 06:36

## 2025-04-22 RX ADMIN — HYDROMORPHONE HYDROCHLORIDE 1 MG: 1 INJECTION, SOLUTION INTRAMUSCULAR; INTRAVENOUS; SUBCUTANEOUS at 18:16

## 2025-04-22 RX ADMIN — MIDAZOLAM 1 MG: 1 INJECTION INTRAMUSCULAR; INTRAVENOUS at 20:21

## 2025-04-22 RX ADMIN — HYDROMORPHONE HYDROCHLORIDE 1 MG: 1 INJECTION, SOLUTION INTRAMUSCULAR; INTRAVENOUS; SUBCUTANEOUS at 08:43

## 2025-04-22 RX ADMIN — HYDROMORPHONE HYDROCHLORIDE 1 MG: 1 INJECTION, SOLUTION INTRAMUSCULAR; INTRAVENOUS; SUBCUTANEOUS at 02:03

## 2025-04-22 RX ADMIN — HYDROMORPHONE HYDROCHLORIDE 1 MG: 1 INJECTION, SOLUTION INTRAMUSCULAR; INTRAVENOUS; SUBCUTANEOUS at 05:31

## 2025-04-22 RX ADMIN — MIDAZOLAM 1 MG: 1 INJECTION INTRAMUSCULAR; INTRAVENOUS at 23:57

## 2025-04-22 RX ADMIN — HYDROMORPHONE HYDROCHLORIDE 1 MG: 1 INJECTION, SOLUTION INTRAMUSCULAR; INTRAVENOUS; SUBCUTANEOUS at 13:43

## 2025-04-22 RX ADMIN — MIDAZOLAM 1 MG: 1 INJECTION INTRAMUSCULAR; INTRAVENOUS at 02:04

## 2025-04-22 RX ADMIN — MIDAZOLAM 1 MG: 1 INJECTION INTRAMUSCULAR; INTRAVENOUS at 11:32

## 2025-04-22 ASSESSMENT — PAIN SCALES - GENERAL
PAINLEVEL_OUTOF10: 0
PAINLEVEL_OUTOF10: 0

## 2025-04-22 NOTE — DISCHARGE SUMMARY
Discharge Summary    Name: Jerel Hamm  [id number]  YOB: 1880 (Age: 89 y.o.)   Date of Admission: 4/17/2025  Date of Discharge: 4/22/2025  Attending Physician: Galen Reese MD    Discharge Diagnosis:     Consultations:  None      Brief Admission History/Reason for Admission Per Patel Sharma MD:     4/17 -   Admission H&P  85-year-old male  with colon cancer, hypertension, hyperlipidemia, hypothyroidism, PTSD who was found unresponsive in his apartment on 4/2.  Patient was probably down for about 3 days.  In the ED, he had MEGHANA and rhabdomyolysis.  Patient was intubated for airway protection      During his ICU stay, echo showed an EF of 40%.  MRI demonstrated scattered strokes in multiple vascular territories consistent with an Bolick strokes.     Patient showed no significant neurologic improvement.     His son elected to transition over to comfort care on 4/9     Patient was ultimately admitted to [facility] hospice that showed no sign of deterioration and on 4/17 was felt not to be meeting [facility] criteria and was transition back to   acute care for continued comfort care  ----------------------------------------------------    4/22  Patient is seen and examined today for the follow-up with the nurse.   Remains unresponsive on 1 L NC O2.    Case management is working [facility] getting [facility] for the patient    ------------------------------------------------------------------------------      Brief Hospital Course by Main Problems:     Anoxic encephalopathy (HCC) [G93.1]  Multiple embolic strokes  Acute metabolic and  anoxic encephalopathy  Polymicrobial bacteremia  Septic shock  Acute hypoxic respiratory failure  Pneumomediastinum  Acute kidney injury  Hyponatremia  Lactic acidosis  UTI  Thrombocytopenia        Plan:  Continue with comfort care only  Continue Dilaudid 1 mg every 4 hours  Continue scheduled Versed      Discharge Exam:  Patient

## 2025-04-22 NOTE — PLAN OF CARE
Problem: Discharge Planning  Goal: Discharge to home or other facility with appropriate resources  Outcome: Progressing  Flowsheets (Taken 4/22/2025 0748)  Discharge to home or other facility with appropriate resources:   Identify barriers to discharge with patient and caregiver   Identify discharge learning needs (meds, wound care, etc)   Arrange for needed discharge resources and transportation as appropriate     Problem: Skin/Tissue Integrity  Goal: Skin integrity remains intact  Description: 1.  Monitor for areas of redness and/or skin breakdown2.  Assess vascular access sites hourly3.  Every 4-6 hours minimum:  Change oxygen saturation probe site4.  Every 4-6 hours:  If on nasal continuous positive airway pressure, respiratory therapy assess nares and determine need for appliance change or resting period  Outcome: Progressing  Flowsheets  Taken 4/22/2025 0748 by Dasia Cleaning RN  Skin Integrity Remains Intact:   Turn and reposition as indicated   Assess need for specialty bed   Positioning devices  Taken 4/22/2025 0738 by Dasia Cleaning RN  Skin Integrity Remains Intact: Monitor for areas of redness and/or skin breakdown  Taken 4/21/2025 1912 by Chelsie Macdonald RN  Skin Integrity Remains Intact: Monitor for areas of redness and/or skin breakdown     Problem: Pain  Goal: Verbalizes/displays adequate comfort level or baseline comfort level  Outcome: Progressing  Flowsheets (Taken 4/22/2025 0748)  Verbalizes/displays adequate comfort level or baseline comfort level:   Encourage patient to monitor pain and request assistance   Assess pain using appropriate pain scale   Administer analgesics based on type and severity of pain and evaluate response

## 2025-04-22 NOTE — CARE COORDINATION
DCP: comfort care  GLORIA: 48 hours    CM received call from pt son today and provided pt son with update. Pt confirmed he believes pt is service connected. CM also spoke with ILEANA Rossi and provided her with an update as well.     CM team will continue following.

## 2025-04-23 PROCEDURE — 94761 N-INVAS EAR/PLS OXIMETRY MLT: CPT

## 2025-04-23 PROCEDURE — 6360000002 HC RX W HCPCS: Performed by: INTERNAL MEDICINE

## 2025-04-23 PROCEDURE — 2500000003 HC RX 250 WO HCPCS: Performed by: INTERNAL MEDICINE

## 2025-04-23 PROCEDURE — 6370000000 HC RX 637 (ALT 250 FOR IP): Performed by: INTERNAL MEDICINE

## 2025-04-23 PROCEDURE — 2700000000 HC OXYGEN THERAPY PER DAY

## 2025-04-23 PROCEDURE — 1100000000 HC RM PRIVATE

## 2025-04-23 RX ADMIN — HYDROMORPHONE HYDROCHLORIDE 1 MG: 1 INJECTION, SOLUTION INTRAMUSCULAR; INTRAVENOUS; SUBCUTANEOUS at 01:23

## 2025-04-23 RX ADMIN — HYDROMORPHONE HYDROCHLORIDE 1 MG: 1 INJECTION, SOLUTION INTRAMUSCULAR; INTRAVENOUS; SUBCUTANEOUS at 07:50

## 2025-04-23 RX ADMIN — HYDROMORPHONE HYDROCHLORIDE 1 MG: 1 INJECTION, SOLUTION INTRAMUSCULAR; INTRAVENOUS; SUBCUTANEOUS at 11:55

## 2025-04-23 RX ADMIN — MIDAZOLAM 1 MG: 1 INJECTION INTRAMUSCULAR; INTRAVENOUS at 07:50

## 2025-04-23 RX ADMIN — MIDAZOLAM 1 MG: 1 INJECTION INTRAMUSCULAR; INTRAVENOUS at 04:00

## 2025-04-23 RX ADMIN — MIDAZOLAM 1 MG: 1 INJECTION INTRAMUSCULAR; INTRAVENOUS at 11:55

## 2025-04-23 RX ADMIN — HYDROMORPHONE HYDROCHLORIDE 1 MG: 1 INJECTION, SOLUTION INTRAMUSCULAR; INTRAVENOUS; SUBCUTANEOUS at 22:16

## 2025-04-23 RX ADMIN — HYDROMORPHONE HYDROCHLORIDE 1 MG: 1 INJECTION, SOLUTION INTRAMUSCULAR; INTRAVENOUS; SUBCUTANEOUS at 15:29

## 2025-04-23 RX ADMIN — MIDAZOLAM 1 MG: 1 INJECTION INTRAMUSCULAR; INTRAVENOUS at 15:30

## 2025-04-23 RX ADMIN — HYDROMORPHONE HYDROCHLORIDE 1 MG: 1 INJECTION, SOLUTION INTRAMUSCULAR; INTRAVENOUS; SUBCUTANEOUS at 06:21

## 2025-04-23 ASSESSMENT — PAIN SCALES - GENERAL
PAINLEVEL_OUTOF10: 0
PAINLEVEL_OUTOF10: 0

## 2025-04-23 NOTE — CARE COORDINATION
DCP: comfort care; complex placement  GLORIA: >48 hours    [facility] called [facility] back and asked to speak with someone else today, and was told that pt is only 50% service connected and that does not qualify him for LTC placement.     CM team will continue following.

## 2025-04-23 NOTE — PLAN OF CARE
Problem: Discharge Planning  Goal: Discharge to home or other facility with appropriate resources  4/23/2025 0211 by Annabelle Alvarez RN  Outcome: Progressing     Problem: Skin/Tissue Integrity  Goal: Skin integrity remains intact  Description: 1.  Monitor for areas of redness and/or skin breakdown2.  Assess vascular access sites hourly3.  Every 4-6 hours minimum:  Change oxygen saturation probe site4.  Every 4-6 hours:  If on nasal continuous positive airway pressure, respiratory therapy assess nares and determine need for appliance change or resting period  4/23/2025 0736 by Marcial Tucker RN  Outcome: Progressing  4/23/2025 0211 by Annabelle Alvarez RN  Outcome: Progressing     Problem: Pain  Goal: Verbalizes/displays adequate comfort level or baseline comfort level  4/23/2025 0736 by Marcial Tucker RN  Outcome: Progressing  4/23/2025 0211 by Annabelle Alvarez RN  Outcome: Progressing     Problem: Safety - Adult  Goal: Free from fall injury  4/23/2025 0736 by Marcial Tucker RN  Outcome: Progressing  4/23/2025 0211 by Annabelle Alvarez RN  Outcome: Progressing

## 2025-04-23 NOTE — DISCHARGE SUMMARY
Discharge Summary    Name: Phani Rossi  [id number]  YOB: 1940 (Age: 85 y.o.)   Date of Admission: 4/17/2025  Date of Discharge: 4/23/2025  Attending Physician: Galen Reese MD    Discharge Diagnosis:     Consultations:  IP CONSULT TO SPIRITUAL CARE      Brief Admission History/Reason for Admission Per Patel Sharma MD:     4/17 -   Admission H&P  85-year-old male  with colon cancer, hypertension, hyperlipidemia, hypothyroidism, PTSD who was found unresponsive in his apartment on 4/2.  Patient was probably down for about 3 days.  In the ED, he had MEGHANA and rhabdomyolysis.  Patient was intubated for airway protection      During his ICU stay, echo showed an EF of 40%.  MRI demonstrated scattered strokes in multiple vascular territories consistent with an Bolick strokes.     Patient showed no significant neurologic improvement.     His son elected to transition over to comfort care on 4/9     Patient was ultimately admitted to [facility] hospice that showed no sign of deterioration and on 4/17 was felt not to be meeting [facility] criteria and was transition back to   acute care for continued comfort care  ----------------------------------------------------    4/23  Patient is seen and examined today for the follow-up with the nurse.   Remains unresponsive on 1 L NC O2.    Case management is working on getting [facility] for the patient    ------------------------------------------------------------------------------      Brief Hospital Course by Main Problems:     Anoxic encephalopathy (HCC) [G93.1]  Multiple embolic strokes  Acute metabolic and  anoxic encephalopathy  Polymicrobial bacteremia  Septic shock  Acute hypoxic respiratory failure  Pneumomediastinum  Acute kidney injury  Hyponatremia  Lactic acidosis  UTI  Thrombocytopenia        Plan:  Continue with comfort care only  Continue Dilaudid 1 mg every 4 hours  Continue scheduled

## 2025-04-24 PROCEDURE — 6360000002 HC RX W HCPCS: Performed by: INTERNAL MEDICINE

## 2025-04-24 PROCEDURE — 2700000000 HC OXYGEN THERAPY PER DAY

## 2025-04-24 PROCEDURE — 1100000000 HC RM PRIVATE

## 2025-04-24 PROCEDURE — 94761 N-INVAS EAR/PLS OXIMETRY MLT: CPT

## 2025-04-24 PROCEDURE — 2500000003 HC RX 250 WO HCPCS: Performed by: INTERNAL MEDICINE

## 2025-04-24 RX ADMIN — HYDROMORPHONE HYDROCHLORIDE 1 MG: 1 INJECTION, SOLUTION INTRAMUSCULAR; INTRAVENOUS; SUBCUTANEOUS at 15:58

## 2025-04-24 RX ADMIN — MIDAZOLAM 1 MG: 1 INJECTION INTRAMUSCULAR; INTRAVENOUS at 15:58

## 2025-04-24 RX ADMIN — MIDAZOLAM 1 MG: 1 INJECTION INTRAMUSCULAR; INTRAVENOUS at 00:25

## 2025-04-24 RX ADMIN — MIDAZOLAM 1 MG: 1 INJECTION INTRAMUSCULAR; INTRAVENOUS at 19:58

## 2025-04-24 RX ADMIN — HYDROMORPHONE HYDROCHLORIDE 1 MG: 1 INJECTION, SOLUTION INTRAMUSCULAR; INTRAVENOUS; SUBCUTANEOUS at 19:57

## 2025-04-24 RX ADMIN — HYDROMORPHONE HYDROCHLORIDE 1 MG: 1 INJECTION, SOLUTION INTRAMUSCULAR; INTRAVENOUS; SUBCUTANEOUS at 07:38

## 2025-04-24 RX ADMIN — HYDROMORPHONE HYDROCHLORIDE 1 MG: 1 INJECTION, SOLUTION INTRAMUSCULAR; INTRAVENOUS; SUBCUTANEOUS at 02:02

## 2025-04-24 RX ADMIN — MIDAZOLAM 1 MG: 1 INJECTION INTRAMUSCULAR; INTRAVENOUS at 12:08

## 2025-04-24 RX ADMIN — MIDAZOLAM 1 MG: 1 INJECTION INTRAMUSCULAR; INTRAVENOUS at 07:38

## 2025-04-24 RX ADMIN — HYDROMORPHONE HYDROCHLORIDE 1 MG: 1 INJECTION, SOLUTION INTRAMUSCULAR; INTRAVENOUS; SUBCUTANEOUS at 12:07

## 2025-04-24 RX ADMIN — HYDROMORPHONE HYDROCHLORIDE 1 MG: 1 INJECTION, SOLUTION INTRAMUSCULAR; INTRAVENOUS; SUBCUTANEOUS at 05:40

## 2025-04-24 NOTE — CARE COORDINATION
ALEXANDRE received telephone call from Stormy at the [facility].  She stated the [facility] will supply hosp[facility] the patient but will need specific documentation from provider regarding Goals of Care to include:    Goals of care discussion, provider has had the conversation with the patient and family regarding code status.  Role of vent support, role of future hospitalizations, role of artificial hydration, role of artificial nutrition and the role of dialysis.      Barbara is requesting additional documentation to include: Chest XRAY, and DNR. ALEXANDRE has faxed chest xray to [phone].  There is no DDNR in patients EMR or physical chart.     Once all documentation is available for the [facility], they will send the documentation for approval for LTC with history.  [facility].

## 2025-04-24 NOTE — PLAN OF CARE
Problem: Safety - Adult  Goal: Free from fall injury  Outcome: Progressing      PAIN SCALE 7 OF 10.

## 2025-04-24 NOTE — CARE COORDINATION
CM faxed clinicals to [facility] via fax [phone].  CM called [facility] [phone] extension [phone], no answer to telephone.  CM left voicemail for Stormy at the [facility] who handles their SNF placement contracts.  CM requested return telephone call to discuss [place] whom needs LTC with hospice services.

## 2025-04-24 NOTE — DISCHARGE SUMMARY
Discharge Summary    Name: Phani Rossi  [id number]  YOB: 1940 (Age: 85 y.o.)   Date of Admission: 4/17/2025  Date of Discharge: 4/24/2025  Attending Physician: Galen Reese MD    Discharge Diagnosis:     Consultations:  IP CONSULT TO SPIRITUAL CARE      Brief Admission History/Reason for Admission Per Patel Sharma MD:     4/17 -   Admission H&P  85-year-old male  with colon cancer, hypertension, hyperlipidemia, hypothyroidism, PTSD who was found unresponsive in his apartment on 4/2.  Patient was probably down for about 3 days.  In the ED, he had MEGHANA and rhabdomyolysis.  Patient was intubated for airway protection      During his ICU stay, echo showed an EF of 40%.  MRI demonstrated scattered strokes in multiple vascular territories consistent with an Bolick strokes.     Patient showed no significant neurologic improvement.     His son elected to transition over to comfort care on 4/9     Patient was ultimately admitted to [facility] hospice that showed no sign of deterioration and on 4/17 was felt not to be meeting [facility] criteria and was transition back to   acute care for continued comfort care  ----------------------------------------------------    4/24  Patient is seen and examined today for the follow-up with the nurse.   Remains unresponsive on 1 L NC O2.    Case management is working on getting [facility] hosp[facility] for the patient    ------------------------------------------------------------------------------      Brief Hospital Course by Main Problems:     Anoxic encephalopathy (HCC) [G93.1]  Multiple embolic strokes  Acute metabolic and  anoxic encephalopathy  Polymicrobial bacteremia  Septic shock  Acute hypoxic respiratory failure  Pneumomediastinum  Acute kidney injury  Hyponatremia  Lactic acidosis  UTI  Thrombocytopenia        Plan:  Continue with comfort care only  Continue Dilaudid 1 mg every 4 hours  Continue scheduled

## 2025-04-24 NOTE — PLAN OF CARE
Problem: Safety - Adult  Goal: Free from fall injury  4/24/2025 1416 by Marcial Tucker, RN  Outcome: Progressing  4/24/2025 0051 by Vitor Gibson, RN  Outcome: Progressing

## 2025-04-25 VITALS — OXYGEN SATURATION: 77 % | DIASTOLIC BLOOD PRESSURE: 25 MMHG | SYSTOLIC BLOOD PRESSURE: 61 MMHG | TEMPERATURE: 97.3 F

## 2025-04-25 NOTE — PROGRESS NOTES
Spiritual Health History and Assessment/Progress Note  [facility]    Crisis,  ,  ,      Name: Phani Rossi MRN: [id number]    Age: 85 y.o.     Sex: male   Language: English   Mandaeism: None   Anoxic encephalopathy (HCC)     Date: 2025            Total Time Calculated: 10 min              Spiritual Assessment continued in SSR 4 [facility] MEDICAL ONCOLOGY        Referral/Consult From: Nurse   Encounter Overview/Reason: Crisis  Service Provided For: Patient not available    Isamar, Belief, Meaning:   Patient Other:    Family/Friends No family/friends present      Importance and Influence:  Patient Other:    Family/Friends No family/friends present    Community:  Patient Other:    Family/Friends No family/friends present    Assessment and Plan of Care:   This  responded to Nurses' page at this time that Patient  and no family is present. Listened attentively. Maintained ministry of presence.  available upon request.  Patient Interventions include: Other:    Family/Friends Interventions include: No family/friends present    Patient Plan of Care: Other:    Family/Friends Plan of Care: No family/friends present    Electronically signed by Pola Edouardin Intern on 2025 at 1:05 AM

## 2025-04-25 NOTE — PLAN OF CARE
Problem: Pain  Goal: Verbalizes/displays adequate comfort level or baseline comfort level  Outcome: Progressing     Problem: Skin/Tissue Integrity  Goal: Skin integrity remains intact  Description: 1.  Monitor for areas of redness and/or skin breakdown2.  Assess vascular access sites hourly3.  Every 4-6 hours minimum:  Change oxygen saturation probe site4.  Every 4-6 hours:  If on nasal continuous positive airway pressure, respiratory therapy assess nares and determine need for appliance change or resting period  Outcome: Progressing     Problem: Safety - Adult  Goal: Free from fall injury  4/24/2025 1416 by Marcial Tucker RN  Outcome: Progressing

## 2025-04-25 NOTE — SIGNIFICANT EVENT
Called to bedside to confirm death.  Patient has been on comfort care while awaiting [facility] hosp[facility]. Family aware of situation.  Time of death 2325 on 4/24/25.  On exam, corneal reflex absent.  Breath sounds absent and Heartbeat absent via auscultation. No palpable pulse. Death confirmed.     Family was notified by RN.    RICARDO Synder also present during my exam.    Eun Valdez PA-C

## 2025-04-25 NOTE — PROGRESS NOTES
Writer do her rounds and saw that the patient is not breathing anymore. Apical pulse checked one full minute with Amando Weaver RN.  CR- 0  RR-0    Time of Death: 2325  CHIQUI Wills notified at 2331  yessica Lnae supervisor notified at 2332  Called mary Bernardo at 2337  Called patient's son Phani Rossi, he stated that no family is coming, and they will not take his body.   [employer] coordinator Sania notified at 0007, due to pat's age not candidate for tissue, eye and organ.    0240-- post mortem care done.  0255- Transported to [facility].